# Patient Record
Sex: FEMALE | Race: WHITE | NOT HISPANIC OR LATINO | Employment: UNEMPLOYED | URBAN - METROPOLITAN AREA
[De-identification: names, ages, dates, MRNs, and addresses within clinical notes are randomized per-mention and may not be internally consistent; named-entity substitution may affect disease eponyms.]

---

## 2017-10-04 ENCOUNTER — HISTORICAL (OUTPATIENT)
Dept: LAB | Facility: HOSPITAL | Age: 47
End: 2017-10-04

## 2019-06-06 ENCOUNTER — HISTORICAL (OUTPATIENT)
Dept: LAB | Facility: HOSPITAL | Age: 49
End: 2019-06-06

## 2019-06-06 LAB
ABS NEUT (OLG): 8.2
ALBUMIN SERPL-MCNC: 3.3 GM/DL (ref 3.4–5)
ALBUMIN/GLOB SERPL: 0.8 RATIO (ref 1.1–2)
ALP SERPL-CCNC: 103 UNIT/L (ref 46–116)
ALT SERPL-CCNC: 23 UNIT/L (ref 12–78)
APPEARANCE, UA: ABNORMAL
AST SERPL-CCNC: 9 UNIT/L (ref 10–37)
BACTERIA SPEC CULT: ABNORMAL
BASOPHILS # BLD AUTO: 0.03 X10(3)/MCL
BASOPHILS NFR BLD AUTO: 0.2 %
BILIRUB SERPL-MCNC: 0.4 MG/DL (ref 0.2–1)
BILIRUB UR QL STRIP: NEGATIVE
BILIRUBIN DIRECT+TOT PNL SERPL-MCNC: 0.11 MG/DL (ref 0–0.2)
BILIRUBIN DIRECT+TOT PNL SERPL-MCNC: 0.29 MG/DL
BUN SERPL-MCNC: 13 MG/DL (ref 7–18)
CALCIUM SERPL-MCNC: 9.2 MG/DL (ref 8.5–10.1)
CHLORIDE SERPL-SCNC: 101 MMOL/L (ref 98–107)
CHOLEST SERPL-MCNC: 181 MG/DL (ref 50–200)
CHOLEST/HDLC SERPL: 6 {RATIO} (ref 0–5)
CO2 SERPL-SCNC: 29.6 MMOL/L (ref 21–32)
COLOR UR: YELLOW
CREAT SERPL-MCNC: 0.78 MG/DL (ref 0.55–1.02)
CREAT UR-MCNC: 191 MG/DL
EOSINOPHIL # BLD AUTO: 0.45 X10(3)/MCL
EOSINOPHIL NFR BLD AUTO: 3.6 %
ERYTHROCYTE [DISTWIDTH] IN BLOOD BY AUTOMATED COUNT: 15 %
EST. AVERAGE GLUCOSE BLD GHB EST-MCNC: 151 MG/DL
GLOBULIN SER-MCNC: 4.4 GM/DL (ref 2.4–3.5)
GLUCOSE (UA): NEGATIVE
GLUCOSE SERPL-MCNC: 136 MG/DL (ref 74–106)
HBA1C MFR BLD: 6.9 % (ref 4.5–6.2)
HCT VFR BLD AUTO: 40.6 % (ref 34–46)
HDLC SERPL-MCNC: 29 MG/DL (ref 35–60)
HGB BLD-MCNC: 12.9 GM/DL (ref 11.3–15.4)
HGB UR QL STRIP: NEGATIVE
IMM GRANULOCYTES # BLD AUTO: 0.04 10*3/UL (ref 0–0.1)
IMM GRANULOCYTES NFR BLD AUTO: 0.3 % (ref 0–1)
KETONES UR QL STRIP: NEGATIVE
LDLC SERPL CALC-MCNC: 73 MG/DL (ref 50–140)
LEUKOCYTE ESTERASE UR QL STRIP: ABNORMAL
LYMPHOCYTES # BLD AUTO: 3.21 X10(3)/MCL
LYMPHOCYTES NFR BLD AUTO: 25.4 %
MCH RBC QN AUTO: 25.9 PG (ref 27–33)
MCHC RBC AUTO-ENTMCNC: 31.8 GM/DL (ref 32–35)
MCV RBC AUTO: 81.4 FL (ref 81–97)
MICROALBUMIN UR-MCNC: 3.4 MG/DL
MICROALBUMIN/CREAT RATIO PNL UR: 17.8 MG/GM CR (ref 0–30)
MONOCYTES # BLD AUTO: 0.71 X10(3)/MCL
MONOCYTES NFR BLD AUTO: 5.6 %
NEUTROPHILS # BLD AUTO: 8.2 X10(3)/MCL
NEUTROPHILS NFR BLD AUTO: 64.9 %
NITRITE UR QL STRIP: NEGATIVE
PH UR STRIP: 6 [PH] (ref 4.6–8)
PLATELET # BLD AUTO: 273 X10(3)/MCL (ref 151–368)
PMV BLD AUTO: 9 FL
POTASSIUM SERPL-SCNC: 4.8 MMOL/L (ref 3.5–5.1)
PROT SERPL-MCNC: 7.7 GM/DL (ref 6.4–8.2)
PROT UR QL STRIP: NEGATIVE
RBC # BLD AUTO: 4.99 X10(6)/MCL (ref 3.9–5)
RBC #/AREA URNS HPF: ABNORMAL /[HPF]
SODIUM SERPL-SCNC: 138 MMOL/L (ref 136–145)
SP GR UR STRIP: 1.02 (ref 1–1.03)
SQUAMOUS EPITHELIAL, UA: ABNORMAL
TRIGL SERPL-MCNC: 393 MG/DL (ref 30–150)
TSH SERPL-ACNC: 3.48 MIU/ML (ref 0.35–3.75)
UROBILINOGEN UR STRIP-ACNC: 0.2
VLDLC SERPL CALC-MCNC: 79 MG/DL
WBC # SPEC AUTO: 12.64 X10(3)/MCL (ref 3.4–9.2)
WBC #/AREA URNS HPF: ABNORMAL /HPF

## 2019-06-13 ENCOUNTER — HISTORICAL (OUTPATIENT)
Dept: LAB | Facility: HOSPITAL | Age: 49
End: 2019-06-13

## 2019-06-13 LAB
APPEARANCE, UA: ABNORMAL
BACTERIA SPEC CULT: ABNORMAL
BILIRUB UR QL STRIP: NEGATIVE
COLOR UR: YELLOW
GLUCOSE (UA): NEGATIVE
HGB UR QL STRIP: NEGATIVE
KETONES UR QL STRIP: NEGATIVE
LEUKOCYTE ESTERASE UR QL STRIP: ABNORMAL
NITRITE UR QL STRIP: NEGATIVE
PH UR STRIP: 6 [PH] (ref 4.6–8)
PROT UR QL STRIP: NEGATIVE
RBC #/AREA URNS HPF: ABNORMAL /[HPF]
SP GR UR STRIP: 1.02 (ref 1–1.03)
SQUAMOUS EPITHELIAL, UA: ABNORMAL
UROBILINOGEN UR STRIP-ACNC: 0.2
WBC #/AREA URNS HPF: ABNORMAL /HPF

## 2019-09-13 ENCOUNTER — HISTORICAL (OUTPATIENT)
Dept: LAB | Facility: HOSPITAL | Age: 49
End: 2019-09-13

## 2019-09-13 LAB
ALBUMIN SERPL-MCNC: 3.1 GM/DL (ref 3.4–5)
ALBUMIN/GLOB SERPL: 0.7 RATIO (ref 1.1–2)
ALP SERPL-CCNC: 117 UNIT/L (ref 46–116)
ALT SERPL-CCNC: 21 UNIT/L (ref 12–78)
AST SERPL-CCNC: 16 UNIT/L (ref 10–37)
BILIRUB SERPL-MCNC: 0.5 MG/DL (ref 0.2–1)
BILIRUBIN DIRECT+TOT PNL SERPL-MCNC: 0.12 MG/DL (ref 0–0.2)
BILIRUBIN DIRECT+TOT PNL SERPL-MCNC: 0.38 MG/DL
BUN SERPL-MCNC: 9 MG/DL (ref 7–18)
CALCIUM SERPL-MCNC: 8.7 MG/DL (ref 8.5–10.1)
CHLORIDE SERPL-SCNC: 102 MMOL/L (ref 98–107)
CHOLEST SERPL-MCNC: 154 MG/DL (ref 50–200)
CHOLEST/HDLC SERPL: 4 {RATIO} (ref 0–5)
CO2 SERPL-SCNC: 30.8 MMOL/L (ref 21–32)
CREAT SERPL-MCNC: 0.73 MG/DL (ref 0.55–1.02)
GLOBULIN SER-MCNC: 4.4 GM/DL (ref 2.4–3.5)
GLUCOSE SERPL-MCNC: 133 MG/DL (ref 74–106)
HDLC SERPL-MCNC: 35 MG/DL (ref 35–60)
LDLC SERPL CALC-MCNC: 81 MG/DL (ref 50–140)
POTASSIUM SERPL-SCNC: 4.1 MMOL/L (ref 3.5–5.1)
PROT SERPL-MCNC: 7.5 GM/DL (ref 6.4–8.2)
SODIUM SERPL-SCNC: 139 MMOL/L (ref 136–145)
TRIGL SERPL-MCNC: 191 MG/DL (ref 30–150)
VLDLC SERPL CALC-MCNC: 38 MG/DL

## 2019-09-18 ENCOUNTER — HISTORICAL (OUTPATIENT)
Dept: LAB | Facility: HOSPITAL | Age: 49
End: 2019-09-18

## 2019-09-18 LAB
EST. AVERAGE GLUCOSE BLD GHB EST-MCNC: 166 MG/DL
HBA1C MFR BLD: 7.4 % (ref 4.5–6.2)

## 2019-12-05 ENCOUNTER — HISTORICAL (OUTPATIENT)
Dept: RADIOLOGY | Facility: HOSPITAL | Age: 49
End: 2019-12-05

## 2019-12-19 ENCOUNTER — HISTORICAL (OUTPATIENT)
Dept: LAB | Facility: HOSPITAL | Age: 49
End: 2019-12-19

## 2019-12-19 LAB
CHOLEST SERPL-MCNC: 198 MG/DL
CHOLEST/HDLC SERPL: 5 {RATIO} (ref 0–5)
EST. AVERAGE GLUCOSE BLD GHB EST-MCNC: 134 MG/DL
HBA1C MFR BLD: 6.3 % (ref 4–6)
HDLC SERPL-MCNC: 41 MG/DL
LDLC SERPL CALC-MCNC: 124 MG/DL (ref 50–140)
TRIGL SERPL-MCNC: 165 MG/DL (ref 37–140)
VLDLC SERPL CALC-MCNC: 33 MG/DL

## 2020-01-09 ENCOUNTER — HISTORICAL (OUTPATIENT)
Dept: RADIOLOGY | Facility: HOSPITAL | Age: 50
End: 2020-01-09

## 2020-03-17 ENCOUNTER — HISTORICAL (OUTPATIENT)
Dept: LAB | Facility: HOSPITAL | Age: 50
End: 2020-03-17

## 2020-03-17 LAB
EST. AVERAGE GLUCOSE BLD GHB EST-MCNC: 140 MG/DL
HBA1C MFR BLD: 6.5 % (ref 4–6)

## 2020-07-21 ENCOUNTER — HISTORICAL (OUTPATIENT)
Dept: LAB | Facility: HOSPITAL | Age: 50
End: 2020-07-21

## 2020-07-21 LAB
BUN SERPL-MCNC: 13 MG/DL (ref 7–18.7)
CALCIUM SERPL-MCNC: 9.5 MG/DL (ref 8.4–10.2)
CHLORIDE SERPL-SCNC: 103 MMOL/L (ref 98–107)
CHOLEST SERPL-MCNC: 161 MG/DL
CHOLEST/HDLC SERPL: 4 {RATIO} (ref 0–5)
CO2 SERPL-SCNC: 30 MEQ/L (ref 22–29)
CREAT SERPL-MCNC: 0.63 MG/DL (ref 0.55–1.02)
CREAT UR-MCNC: 135.2 MG/DL (ref 45–106)
CREAT/UREA NIT SERPL: 21
EST. AVERAGE GLUCOSE BLD GHB EST-MCNC: 137 MG/DL
GLUCOSE SERPL-MCNC: 98 MG/DL (ref 74–100)
HBA1C MFR BLD: 6.4 % (ref 4–6)
HDLC SERPL-MCNC: 39 MG/DL (ref 35–60)
LDLC SERPL CALC-MCNC: 92 MG/DL (ref 50–140)
MICROALBUMIN UR-MCNC: 8.9 UG/ML
MICROALBUMIN/CREAT RATIO PNL UR: 6.6 MG/GM CR (ref 0–30)
POTASSIUM SERPL-SCNC: 4.3 MMOL/L (ref 3.5–5.1)
SODIUM SERPL-SCNC: 142 MMOL/L (ref 136–145)
TRIGL SERPL-MCNC: 150 MG/DL (ref 37–140)
VLDLC SERPL CALC-MCNC: 30 MG/DL

## 2020-07-29 ENCOUNTER — HISTORICAL (OUTPATIENT)
Dept: RADIOLOGY | Facility: HOSPITAL | Age: 50
End: 2020-07-29

## 2020-09-10 ENCOUNTER — HISTORICAL (OUTPATIENT)
Dept: LAB | Facility: HOSPITAL | Age: 50
End: 2020-09-10

## 2020-10-19 ENCOUNTER — HISTORICAL (OUTPATIENT)
Dept: LAB | Facility: HOSPITAL | Age: 50
End: 2020-10-19

## 2020-10-19 LAB
BUN SERPL-MCNC: 8 MG/DL (ref 7–18.7)
CALCIUM SERPL-MCNC: 8.9 MG/DL (ref 8.4–10.2)
CHLORIDE SERPL-SCNC: 104 MMOL/L (ref 98–107)
CO2 SERPL-SCNC: 24 MEQ/L (ref 22–29)
CREAT SERPL-MCNC: 0.89 MG/DL (ref 0.55–1.02)
CREAT/UREA NIT SERPL: 9
EST. AVERAGE GLUCOSE BLD GHB EST-MCNC: 166 MG/DL
GLUCOSE SERPL-MCNC: 197 MG/DL (ref 74–100)
HBA1C MFR BLD: 7.4 % (ref 4–6)
POTASSIUM SERPL-SCNC: 3.7 MMOL/L (ref 3.5–5.1)
SODIUM SERPL-SCNC: 140 MMOL/L (ref 136–145)

## 2020-11-27 ENCOUNTER — HISTORICAL (OUTPATIENT)
Dept: LAB | Facility: HOSPITAL | Age: 50
End: 2020-11-27

## 2021-01-19 ENCOUNTER — HISTORICAL (OUTPATIENT)
Dept: LAB | Facility: HOSPITAL | Age: 51
End: 2021-01-19

## 2021-01-19 LAB
BUN SERPL-MCNC: 13 MG/DL (ref 9.8–20.1)
CALCIUM SERPL-MCNC: 9.2 MG/DL (ref 8.4–10.2)
CHLORIDE SERPL-SCNC: 102 MMOL/L (ref 98–107)
CO2 SERPL-SCNC: 27 MEQ/L (ref 22–29)
CREAT SERPL-MCNC: 0.78 MG/DL (ref 0.55–1.02)
CREAT UR-MCNC: 112.1 MG/DL (ref 45–106)
CREAT/UREA NIT SERPL: 17
EST. AVERAGE GLUCOSE BLD GHB EST-MCNC: 151 MG/DL
GLUCOSE SERPL-MCNC: 246 MG/DL (ref 74–100)
HBA1C MFR BLD: 6.9 % (ref 4–6)
MICROALBUMIN UR-MCNC: 9.5 UG/ML
MICROALBUMIN/CREAT RATIO PNL UR: 8.5 MG/GM CR (ref 0–30)
POTASSIUM SERPL-SCNC: 4.6 MMOL/L (ref 3.5–5.1)
SODIUM SERPL-SCNC: 141 MMOL/L (ref 136–145)

## 2021-01-20 ENCOUNTER — HISTORICAL (OUTPATIENT)
Dept: LAB | Facility: HOSPITAL | Age: 51
End: 2021-01-20

## 2021-03-10 ENCOUNTER — HISTORICAL (OUTPATIENT)
Dept: LAB | Facility: HOSPITAL | Age: 51
End: 2021-03-10

## 2021-03-10 LAB
ABS NEUT (OLG): 8.54
APPEARANCE, UA: CLEAR
BACTERIA SPEC CULT: ABNORMAL
BASOPHILS # BLD AUTO: 0.03 X10(3)/MCL
BASOPHILS NFR BLD AUTO: 0.3 %
BILIRUB UR QL STRIP: NEGATIVE
BUN SERPL-MCNC: 23 MG/DL (ref 9.8–20.1)
CALCIUM SERPL-MCNC: 9.3 MG/DL (ref 8.4–10.2)
CHLORIDE SERPL-SCNC: 100 MMOL/L (ref 98–107)
CO2 SERPL-SCNC: 31 MEQ/L (ref 22–29)
COLOR UR: YELLOW
CREAT SERPL-MCNC: 1.13 MG/DL (ref 0.55–1.02)
CREAT/UREA NIT SERPL: 20
EOSINOPHIL # BLD AUTO: 0.28 X10(3)/MCL
EOSINOPHIL NFR BLD AUTO: 2.4 %
ERYTHROCYTE [DISTWIDTH] IN BLOOD BY AUTOMATED COUNT: 17 %
GLUCOSE (UA): ABNORMAL
GLUCOSE SERPL-MCNC: 266 MG/DL (ref 74–100)
HCT VFR BLD AUTO: 35.2 % (ref 34–46)
HGB BLD-MCNC: 10.4 GM/DL (ref 11.3–15.4)
HGB UR QL STRIP: NEGATIVE
IMM GRANULOCYTES # BLD AUTO: 0.04 10*3/UL (ref 0–0.1)
IMM GRANULOCYTES NFR BLD AUTO: 0.3 % (ref 0–1)
KETONES UR QL STRIP: NEGATIVE
LEUKOCYTE ESTERASE UR QL STRIP: NEGATIVE
LYMPHOCYTES # BLD AUTO: 2.24 X10(3)/MCL
LYMPHOCYTES NFR BLD AUTO: 18.9 %
MCH RBC QN AUTO: 22.9 PG (ref 27–33)
MCHC RBC AUTO-ENTMCNC: 29.5 GM/DL (ref 32–35)
MCV RBC AUTO: 77.4 FL (ref 81–97)
MONOCYTES # BLD AUTO: 0.75 X10(3)/MCL
MONOCYTES NFR BLD AUTO: 6.3 %
NEUTROPHILS # BLD AUTO: 8.54 X10(3)/MCL
NEUTROPHILS NFR BLD AUTO: 71.8 %
NITRITE UR QL STRIP: NEGATIVE
PH UR STRIP: 5.5 [PH] (ref 4.6–8)
PLATELET # BLD AUTO: 313 X10(3)/MCL (ref 140–450)
PMV BLD AUTO: 10 FL
POTASSIUM SERPL-SCNC: 4.2 MMOL/L (ref 3.5–5.1)
PROT UR QL STRIP: NEGATIVE
RBC # BLD AUTO: 4.55 X10(6)/MCL (ref 3.9–5)
RBC #/AREA URNS HPF: ABNORMAL /[HPF]
SODIUM SERPL-SCNC: 139 MMOL/L (ref 136–145)
SP GR UR STRIP: 1.02 (ref 1–1.03)
SQUAMOUS EPITHELIAL, UA: ABNORMAL
UROBILINOGEN UR STRIP-ACNC: 0.2
WBC # SPEC AUTO: 11.88 X10(3)/MCL (ref 3.4–9.2)
WBC #/AREA URNS HPF: ABNORMAL /HPF

## 2021-04-07 ENCOUNTER — HISTORICAL (OUTPATIENT)
Dept: LAB | Facility: HOSPITAL | Age: 51
End: 2021-04-07

## 2021-04-07 LAB
ABS NEUT (OLG): 8.26
ANISOCYTOSIS BLD QL SMEAR: NORMAL
BASOPHILS # BLD AUTO: 0.02 X10(3)/MCL
BASOPHILS NFR BLD AUTO: 0.2 %
BUN SERPL-MCNC: 13 MG/DL (ref 9.8–20.1)
CALCIUM SERPL-MCNC: 9.4 MG/DL (ref 8.4–10.2)
CHLORIDE SERPL-SCNC: 101 MMOL/L (ref 98–107)
CHOLEST SERPL-MCNC: 141 MG/DL
CHOLEST/HDLC SERPL: 4 {RATIO} (ref 0–5)
CO2 SERPL-SCNC: 32 MEQ/L (ref 22–29)
CREAT SERPL-MCNC: 0.71 MG/DL (ref 0.55–1.02)
CREAT UR-MCNC: 51 MG/DL (ref 45–106)
CREAT/UREA NIT SERPL: 18
EOSINOPHIL # BLD AUTO: 0.33 X10(3)/MCL
EOSINOPHIL NFR BLD AUTO: 2.8 %
ERYTHROCYTE [DISTWIDTH] IN BLOOD BY AUTOMATED COUNT: 18 %
EST. AVERAGE GLUCOSE BLD GHB EST-MCNC: 160 MG/DL
FERRITIN SERPL-MCNC: 8.24 NG/ML (ref 4.63–204)
GLUCOSE SERPL-MCNC: 178 MG/DL (ref 74–100)
HBA1C MFR BLD: 7.2 % (ref 4–6)
HCT VFR BLD AUTO: 35.6 % (ref 34–46)
HDLC SERPL-MCNC: 36 MG/DL (ref 35–60)
HGB BLD-MCNC: 10.2 GM/DL (ref 11.3–15.4)
HYPOCHROMIA BLD QL SMEAR: NORMAL
IMM GRANULOCYTES # BLD AUTO: 0.03 10*3/UL (ref 0–0.1)
IMM GRANULOCYTES NFR BLD AUTO: 0.3 % (ref 0–1)
IRON SATN MFR SERPL: 8 % (ref 20–50)
IRON SERPL-MCNC: 24 UG/DL (ref 50–170)
LDLC SERPL CALC-MCNC: 74 MG/DL (ref 50–140)
LYMPHOCYTES # BLD AUTO: 2.49 X10(3)/MCL
LYMPHOCYTES NFR BLD AUTO: 21.4 %
MACROCYTES BLD QL SMEAR: SLIGHT
MCH RBC QN AUTO: 22.1 PG (ref 27–33)
MCHC RBC AUTO-ENTMCNC: 28.7 GM/DL (ref 32–35)
MCV RBC AUTO: 77.2 FL (ref 81–97)
MICROALBUMIN UR-MCNC: <5 UG/ML
MICROALBUMIN/CREAT RATIO PNL UR: <9.8 MG/GM CR (ref 0–30)
MICROCYTES BLD QL SMEAR: NORMAL
MONOCYTES # BLD AUTO: 0.51 X10(3)/MCL
MONOCYTES NFR BLD AUTO: 4.4 %
NEUTROPHILS # BLD AUTO: 8.26 X10(3)/MCL
NEUTROPHILS NFR BLD AUTO: 70.9 %
PLATELET # BLD AUTO: 332 X10(3)/MCL (ref 140–450)
PLATELET # BLD EST: NORMAL 10*3/UL
PMV BLD AUTO: 10 FL
POLYCHROMASIA BLD QL SMEAR: SLIGHT
POTASSIUM SERPL-SCNC: 4 MMOL/L (ref 3.5–5.1)
RBC # BLD AUTO: 4.61 X10(6)/MCL (ref 3.9–5)
SODIUM SERPL-SCNC: 140 MMOL/L (ref 136–145)
TARGETS BLD QL SMEAR: SLIGHT
TIBC SERPL-MCNC: 292 UG/DL (ref 70–310)
TIBC SERPL-MCNC: 316 UG/DL (ref 250–450)
TRANSFERRIN SERPL-MCNC: 275 MG/DL (ref 180–382)
TRIGL SERPL-MCNC: 155 MG/DL (ref 37–140)
VIT B12 SERPL-MCNC: 366 PG/ML (ref 213–816)
VLDLC SERPL CALC-MCNC: 31 MG/DL
WBC # SPEC AUTO: 11.64 X10(3)/MCL (ref 3.4–9.2)

## 2021-04-14 ENCOUNTER — HISTORICAL (OUTPATIENT)
Dept: INFECTIOUS DISEASES | Facility: HOSPITAL | Age: 51
End: 2021-04-14

## 2021-06-29 ENCOUNTER — HISTORICAL (OUTPATIENT)
Dept: LAB | Facility: HOSPITAL | Age: 51
End: 2021-06-29

## 2021-06-29 LAB
ABS NEUT (OLG): 5.76
BASOPHILS # BLD AUTO: 0.02 X10(3)/MCL
BASOPHILS NFR BLD AUTO: 0.2 %
BUN SERPL-MCNC: 12 MG/DL (ref 9.8–20.1)
CALCIUM SERPL-MCNC: 8.9 MG/DL (ref 8.4–10.2)
CHLORIDE SERPL-SCNC: 105 MMOL/L (ref 98–107)
CO2 SERPL-SCNC: 27 MEQ/L (ref 22–29)
CREAT SERPL-MCNC: 0.64 MG/DL (ref 0.55–1.02)
CREAT/UREA NIT SERPL: 19
EOSINOPHIL # BLD AUTO: 0.35 X10(3)/MCL
EOSINOPHIL NFR BLD AUTO: 3.8 %
ERYTHROCYTE [DISTWIDTH] IN BLOOD BY AUTOMATED COUNT: 20 %
EST. AVERAGE GLUCOSE BLD GHB EST-MCNC: 146 MG/DL
GLUCOSE SERPL-MCNC: 133 MG/DL (ref 74–100)
HBA1C MFR BLD: 6.7 % (ref 4–6)
HCT VFR BLD AUTO: 42.4 % (ref 34–46)
HGB BLD-MCNC: 12.5 GM/DL (ref 11.3–15.4)
IMM GRANULOCYTES # BLD AUTO: 0.02 10*3/UL (ref 0–0.1)
IMM GRANULOCYTES NFR BLD AUTO: 0.2 % (ref 0–1)
LYMPHOCYTES # BLD AUTO: 2.49 X10(3)/MCL
LYMPHOCYTES NFR BLD AUTO: 27.1 %
MCH RBC QN AUTO: 24 PG (ref 27–33)
MCHC RBC AUTO-ENTMCNC: 29.5 GM/DL (ref 32–35)
MCV RBC AUTO: 81.4 FL (ref 81–97)
MONOCYTES # BLD AUTO: 0.54 X10(3)/MCL
MONOCYTES NFR BLD AUTO: 5.9 %
NEUTROPHILS # BLD AUTO: 5.76 X10(3)/MCL
NEUTROPHILS NFR BLD AUTO: 62.8 %
PLATELET # BLD AUTO: 314 X10(3)/MCL (ref 140–450)
PMV BLD AUTO: 10 FL
POTASSIUM SERPL-SCNC: 3.8 MMOL/L (ref 3.5–5.1)
RBC # BLD AUTO: 5.21 X10(6)/MCL (ref 3.9–5)
SODIUM SERPL-SCNC: 143 MMOL/L (ref 136–145)
WBC # SPEC AUTO: 9.18 X10(3)/MCL (ref 3.4–9.2)

## 2021-07-06 ENCOUNTER — HISTORICAL (OUTPATIENT)
Dept: RADIOLOGY | Facility: HOSPITAL | Age: 51
End: 2021-07-06

## 2021-10-05 ENCOUNTER — HISTORICAL (OUTPATIENT)
Dept: LAB | Facility: HOSPITAL | Age: 51
End: 2021-10-05

## 2021-10-05 LAB
ABS NEUT (OLG): 6.85
BASOPHILS # BLD AUTO: 0.04 X10(3)/MCL
BASOPHILS NFR BLD AUTO: 0.4 %
EOSINOPHIL # BLD AUTO: 0.33 X10(3)/MCL
EOSINOPHIL NFR BLD AUTO: 3.1 %
ERYTHROCYTE [DISTWIDTH] IN BLOOD BY AUTOMATED COUNT: 16 %
EST. AVERAGE GLUCOSE BLD GHB EST-MCNC: 192 MG/DL
HBA1C MFR BLD: 8.3 % (ref 4–6)
HCT VFR BLD AUTO: 45.8 % (ref 34–46)
HGB BLD-MCNC: 14.4 GM/DL (ref 11.3–15.4)
IMM GRANULOCYTES # BLD AUTO: 0.07 10*3/UL (ref 0–0.1)
IMM GRANULOCYTES NFR BLD AUTO: 0.7 % (ref 0–1)
LYMPHOCYTES # BLD AUTO: 2.68 X10(3)/MCL
LYMPHOCYTES NFR BLD AUTO: 25.5 %
MCH RBC QN AUTO: 26.5 PG (ref 27–33)
MCHC RBC AUTO-ENTMCNC: 31.4 GM/DL (ref 32–35)
MCV RBC AUTO: 84.3 FL (ref 81–97)
MONOCYTES # BLD AUTO: 0.53 X10(3)/MCL
MONOCYTES NFR BLD AUTO: 5 %
NEUTROPHILS # BLD AUTO: 6.85 X10(3)/MCL
NEUTROPHILS NFR BLD AUTO: 65.3 %
PLATELET # BLD AUTO: 326 X10(3)/MCL (ref 140–450)
PMV BLD AUTO: 10 FL
RBC # BLD AUTO: 5.43 X10(6)/MCL (ref 3.9–5)
WBC # SPEC AUTO: 10.5 X10(3)/MCL (ref 3.4–9.2)

## 2021-11-30 LAB
HUMAN PAPILLOMAVIRUS (HPV): NORMAL
PAP RECOMMENDATION EXT: NORMAL
PAP SMEAR: NORMAL

## 2021-12-21 ENCOUNTER — HISTORICAL (OUTPATIENT)
Dept: LAB | Facility: HOSPITAL | Age: 51
End: 2021-12-21

## 2021-12-21 LAB
FLUAV AG UPPER RESP QL IA.RAPID: NEGATIVE
FLUBV AG UPPER RESP QL IA.RAPID: NEGATIVE
SARS-COV-2 RNA RESP QL NAA+PROBE: NOT DETECTED

## 2022-02-08 ENCOUNTER — HISTORICAL (OUTPATIENT)
Dept: LAB | Facility: HOSPITAL | Age: 52
End: 2022-02-08

## 2022-02-08 LAB
ABS NEUT (OLG): 6.37
ALBUMIN SERPL-MCNC: 3.2 G/DL (ref 3.5–5)
ALBUMIN/GLOB SERPL: 0.8 {RATIO} (ref 1.1–2)
ALP SERPL-CCNC: 111 U/L (ref 40–150)
ALT SERPL-CCNC: 17 U/L (ref 0–55)
APPEARANCE, UA: CLEAR
AST SERPL-CCNC: 12 U/L (ref 5–34)
BACTERIA SPEC CULT: NORMAL
BASOPHILS # BLD AUTO: 0.02 10*3/UL
BASOPHILS NFR BLD AUTO: 0.2 %
BILIRUB SERPL-MCNC: 0.5 MG/DL
BILIRUB UR QL STRIP: NEGATIVE
BILIRUBIN DIRECT+TOT PNL SERPL-MCNC: 0.2 (ref 0–0.5)
BILIRUBIN DIRECT+TOT PNL SERPL-MCNC: 0.3
BUN SERPL-MCNC: 9 MG/DL (ref 9.8–20.1)
CALCIUM SERPL-MCNC: 9.4 MG/DL (ref 8.7–10.5)
CHLORIDE SERPL-SCNC: 100 MMOL/L (ref 98–107)
CHOLEST SERPL-MCNC: 199 MG/DL
CHOLEST/HDLC SERPL: 6 {RATIO} (ref 0–5)
CO2 SERPL-SCNC: 29 MMOL/L (ref 22–29)
COLOR UR: YELLOW
CREAT SERPL-MCNC: 0.71 MG/DL (ref 0.55–1.02)
EOSINOPHIL # BLD AUTO: 0.64 10*3/UL
EOSINOPHIL NFR BLD AUTO: 6.4 %
ERYTHROCYTE [DISTWIDTH] IN BLOOD BY AUTOMATED COUNT: 16 %
EST. AVERAGE GLUCOSE BLD GHB EST-MCNC: 200 MG/DL
GLOBULIN SER-MCNC: 4.1 G/DL (ref 2.4–3.5)
GLUCOSE (UA): NORMAL
GLUCOSE SERPL-MCNC: 248 MG/DL (ref 74–100)
HBA1C MFR BLD: 8.6 % (ref 4–6)
HCT VFR BLD AUTO: 44.4 % (ref 34–46)
HDLC SERPL-MCNC: 32 MG/DL (ref 35–60)
HEMOLYSIS INTERF INDEX SERPL-ACNC: -2
HGB BLD-MCNC: 13.8 G/DL (ref 11.3–15.4)
HGB UR QL STRIP: NEGATIVE
ICTERIC INTERF INDEX SERPL-ACNC: 0
IMM GRANULOCYTES # BLD AUTO: 0.02 10*3/UL (ref 0–0.1)
IMM GRANULOCYTES NFR BLD AUTO: 0.2 % (ref 0–1)
KETONES UR QL STRIP: NEGATIVE
LDLC SERPL CALC-MCNC: 100 MG/DL (ref 50–140)
LEUKOCYTE ESTERASE UR QL STRIP: NEGATIVE
LIPEMIC INTERF INDEX SERPL-ACNC: 4
LYMPHOCYTES # BLD AUTO: 2.43 10*3/UL
LYMPHOCYTES NFR BLD AUTO: 24.3 %
MANUAL DIFF? (OHS): NO
MCH RBC QN AUTO: 26.1 PG (ref 27–33)
MCHC RBC AUTO-ENTMCNC: 31.1 G/DL (ref 32–35)
MCV RBC AUTO: 83.9 FL (ref 81–97)
MONOCYTES # BLD AUTO: 0.53 10*3/UL
MONOCYTES NFR BLD AUTO: 5.3 %
NEUTROPHILS # BLD AUTO: 6.37 10*3/UL
NEUTROPHILS NFR BLD AUTO: 63.6 %
NITRITE UR QL STRIP: NEGATIVE
PH UR STRIP: 6 [PH] (ref 4.6–8)
PLATELET # BLD AUTO: 281 10*3/UL (ref 140–450)
PMV BLD AUTO: 10 FL
POTASSIUM SERPL-SCNC: 4.8 MMOL/L (ref 3.5–5.1)
PROT SERPL-MCNC: 7.3 G/DL (ref 6.4–8.3)
PROT UR QL STRIP: NEGATIVE
RBC # BLD AUTO: 5.29 10*6/UL (ref 3.9–5)
RBC #/AREA URNS HPF: NORMAL /[HPF]
SODIUM SERPL-SCNC: 137 MMOL/L (ref 136–145)
SP GR UR STRIP: 1.02 (ref 1–1.03)
SQUAMOUS EPITHELIAL, UA: NORMAL
TRIGL SERPL-MCNC: 335 MG/DL (ref 37–140)
TSH SERPL-ACNC: 2.75 M[IU]/L (ref 0.35–4.94)
UROBILINOGEN UR STRIP-ACNC: 0.2
VLDLC SERPL CALC-MCNC: 67 MG/DL
WBC # SPEC AUTO: 10.01 10*3/UL (ref 3.4–9.2)
WBC #/AREA URNS HPF: NORMAL /[HPF]

## 2022-02-09 LAB
CREAT UR-MCNC: 42.8 MG/DL (ref 45–106)
MICROALBUMIN UR-MCNC: 6.2
MICROALBUMIN/CREAT RATIO PNL UR: 14.5 (ref 0–30)

## 2022-02-28 LAB — HEMOCCULT STL QL IA: NEGATIVE

## 2022-04-11 ENCOUNTER — HISTORICAL (OUTPATIENT)
Dept: ADMINISTRATIVE | Facility: HOSPITAL | Age: 52
End: 2022-04-11
Payer: MEDICAID

## 2022-04-25 DIAGNOSIS — E11.9 TYPE 2 DIABETES MELLITUS WITHOUT COMPLICATION, WITH LONG-TERM CURRENT USE OF INSULIN: Primary | ICD-10-CM

## 2022-04-25 DIAGNOSIS — Z79.4 TYPE 2 DIABETES MELLITUS WITHOUT COMPLICATION, WITH LONG-TERM CURRENT USE OF INSULIN: Primary | ICD-10-CM

## 2022-04-27 VITALS
BODY MASS INDEX: 49.54 KG/M2 | OXYGEN SATURATION: 98 % | SYSTOLIC BLOOD PRESSURE: 136 MMHG | HEIGHT: 61 IN | DIASTOLIC BLOOD PRESSURE: 84 MMHG | WEIGHT: 262.38 LBS

## 2022-04-29 ENCOUNTER — HOSPITAL ENCOUNTER (INPATIENT)
Facility: HOSPITAL | Age: 52
LOS: 4 days | Discharge: HOME | DRG: 193 | End: 2022-05-03
Attending: INTERNAL MEDICINE | Admitting: INTERNAL MEDICINE
Payer: MEDICAID

## 2022-04-29 PROCEDURE — 84484 ASSAY OF TROPONIN QUANT: CPT

## 2022-04-29 PROCEDURE — 80053 COMPREHEN METABOLIC PANEL: CPT

## 2022-04-29 PROCEDURE — 99990 CHARGE CONVERSION: CPT | Mod: QW

## 2022-04-29 PROCEDURE — 94644 CONT INHLJ TX 1ST HOUR: CPT

## 2022-04-29 PROCEDURE — 87040 BLOOD CULTURE FOR BACTERIA: CPT

## 2022-04-29 PROCEDURE — 36415 COLL VENOUS BLD VENIPUNCTURE: CPT

## 2022-04-29 PROCEDURE — 94645 CONT INHLJ TX EACH ADDL HOUR: CPT

## 2022-04-29 PROCEDURE — 96365 THER/PROPH/DIAG IV INF INIT: CPT

## 2022-04-29 PROCEDURE — 82805 BLOOD GASES W/O2 SATURATION: CPT

## 2022-04-29 PROCEDURE — 87636 SARSCOV2 & INF A&B AMP PRB: CPT | Mod: CR

## 2022-04-29 PROCEDURE — 96374 THER/PROPH/DIAG INJ IV PUSH: CPT

## 2022-04-29 PROCEDURE — 87635 SARS-COV-2 COVID-19 AMP PRB: CPT | Mod: QW

## 2022-04-29 PROCEDURE — 93005 ELECTROCARDIOGRAM TRACING: CPT

## 2022-04-29 PROCEDURE — 71046 X-RAY EXAM CHEST 2 VIEWS: CPT

## 2022-04-29 PROCEDURE — 99285 EMERGENCY DEPT VISIT HI MDM: CPT | Mod: 25

## 2022-04-29 PROCEDURE — 36600 WITHDRAWAL OF ARTERIAL BLOOD: CPT

## 2022-04-29 PROCEDURE — 94640 AIRWAY INHALATION TREATMENT: CPT

## 2022-04-29 PROCEDURE — 85025 COMPLETE CBC W/AUTO DIFF WBC: CPT

## 2022-04-30 DIAGNOSIS — J18.9 PNEUMONIA: ICD-10-CM

## 2022-04-30 PROCEDURE — 85025 COMPLETE CBC W/AUTO DIFF WBC: CPT

## 2022-04-30 PROCEDURE — 36415 COLL VENOUS BLD VENIPUNCTURE: CPT

## 2022-04-30 PROCEDURE — A9150 MISC/EXPER NON-PRESCRIPT DRU: HCPCS

## 2022-04-30 PROCEDURE — 71275 CT ANGIOGRAPHY CHEST: CPT

## 2022-04-30 PROCEDURE — 82105 ALPHA-FETOPROTEIN SERUM: CPT

## 2022-04-30 PROCEDURE — 80048 BASIC METABOLIC PNL TOTAL CA: CPT

## 2022-04-30 PROCEDURE — 99990 CHARGE CONVERSION: CPT

## 2022-04-30 RX ORDER — ALBUTEROL SULFATE 0.83 MG/ML
2.5 SOLUTION RESPIRATORY (INHALATION) EVERY 4 HOURS PRN
Status: DISCONTINUED | OUTPATIENT
Start: 2022-04-30 | End: 2022-05-03 | Stop reason: HOSPADM

## 2022-04-30 RX ORDER — EMPAGLIFLOZIN 25 MG/1
25 TABLET, FILM COATED ORAL DAILY
COMMUNITY
Start: 2021-11-17 | End: 2022-06-28

## 2022-04-30 RX ORDER — PANTOPRAZOLE SODIUM 40 MG/1
40 TABLET, DELAYED RELEASE ORAL DAILY
Status: DISCONTINUED | OUTPATIENT
Start: 2022-04-30 | End: 2022-05-03 | Stop reason: HOSPADM

## 2022-04-30 RX ORDER — GLIPIZIDE 10 MG/1
10 TABLET ORAL DAILY
Status: ON HOLD | COMMUNITY
Start: 2022-01-10 | End: 2022-05-02

## 2022-04-30 RX ORDER — ATORVASTATIN CALCIUM 40 MG/1
40 TABLET, FILM COATED ORAL
Status: ON HOLD | COMMUNITY
Start: 2022-01-10 | End: 2022-05-02

## 2022-04-30 RX ORDER — PIOGLITAZONEHYDROCHLORIDE 30 MG/1
30 TABLET ORAL DAILY
COMMUNITY
Start: 2022-02-07 | End: 2022-05-31

## 2022-04-30 RX ORDER — ONDANSETRON 2 MG/ML
4 INJECTION INTRAMUSCULAR; INTRAVENOUS EVERY 4 HOURS PRN
Status: DISCONTINUED | OUTPATIENT
Start: 2022-04-30 | End: 2022-05-03 | Stop reason: HOSPADM

## 2022-04-30 RX ORDER — HYDROCHLOROTHIAZIDE 12.5 MG/1
12.5 CAPSULE ORAL DAILY
COMMUNITY
Start: 2022-01-10 | End: 2022-06-28

## 2022-04-30 RX ORDER — FERROUS GLUCONATE 324(38)MG
324 TABLET ORAL 2 TIMES DAILY
COMMUNITY
Start: 2021-10-13 | End: 2022-09-16

## 2022-04-30 RX ORDER — VENLAFAXINE HYDROCHLORIDE 150 MG/1
150 CAPSULE, EXTENDED RELEASE ORAL DAILY
COMMUNITY
Start: 2022-01-10 | End: 2022-06-28

## 2022-04-30 RX ORDER — ENOXAPARIN SODIUM 100 MG/ML
40 INJECTION SUBCUTANEOUS EVERY 24 HOURS
Status: DISCONTINUED | OUTPATIENT
Start: 2022-04-30 | End: 2022-05-03 | Stop reason: HOSPADM

## 2022-04-30 RX ORDER — SODIUM CHLORIDE, SODIUM LACTATE, POTASSIUM CHLORIDE, CALCIUM CHLORIDE 600; 310; 30; 20 MG/100ML; MG/100ML; MG/100ML; MG/100ML
INJECTION, SOLUTION INTRAVENOUS CONTINUOUS
Status: DISCONTINUED | OUTPATIENT
Start: 2022-04-30 | End: 2022-05-02

## 2022-04-30 RX ORDER — OXYBUTYNIN CHLORIDE 5 MG/1
5 TABLET ORAL 3 TIMES DAILY
Status: DISCONTINUED | OUTPATIENT
Start: 2022-04-30 | End: 2022-05-03 | Stop reason: HOSPADM

## 2022-04-30 RX ORDER — ACETAMINOPHEN 500 MG
1000 TABLET ORAL EVERY 6 HOURS PRN
Status: DISCONTINUED | OUTPATIENT
Start: 2022-04-30 | End: 2022-05-03 | Stop reason: HOSPADM

## 2022-04-30 RX ORDER — LOSARTAN POTASSIUM 100 MG/1
100 TABLET ORAL DAILY
COMMUNITY
Start: 2021-12-13 | End: 2022-07-28

## 2022-04-30 RX ORDER — OSELTAMIVIR PHOSPHATE 75 MG/1
75 CAPSULE ORAL 2 TIMES DAILY
Status: DISCONTINUED | OUTPATIENT
Start: 2022-04-30 | End: 2022-05-03 | Stop reason: HOSPADM

## 2022-04-30 RX ORDER — ARIPIPRAZOLE 5 MG/1
5 TABLET ORAL DAILY
Status: DISCONTINUED | OUTPATIENT
Start: 2022-04-30 | End: 2022-05-03 | Stop reason: HOSPADM

## 2022-04-30 RX ORDER — ARIPIPRAZOLE 5 MG/1
5 TABLET ORAL DAILY
COMMUNITY
Start: 2021-12-13 | End: 2023-03-13

## 2022-04-30 RX ORDER — LANOLIN ALCOHOL/MO/W.PET/CERES
1 CREAM (GRAM) TOPICAL 2 TIMES DAILY
Status: DISCONTINUED | OUTPATIENT
Start: 2022-04-30 | End: 2022-05-03 | Stop reason: HOSPADM

## 2022-04-30 RX ORDER — ACETAMINOPHEN 650 MG/20.3ML
650 LIQUID ORAL EVERY 6 HOURS PRN
Status: DISCONTINUED | OUTPATIENT
Start: 2022-04-30 | End: 2022-05-03 | Stop reason: HOSPADM

## 2022-04-30 RX ORDER — OXYBUTYNIN CHLORIDE 5 MG/1
5 TABLET ORAL 3 TIMES DAILY
COMMUNITY
Start: 2021-12-13 | End: 2022-07-28

## 2022-05-01 LAB
POCT GLUCOSE: 108 MG/DL (ref 70–110)
POCT GLUCOSE: 110 MG/DL (ref 70–110)

## 2022-05-01 PROCEDURE — 63600175 PHARM REV CODE 636 W HCPCS

## 2022-05-01 PROCEDURE — 25500020 PHARM REV CODE 255: Performed by: INTERNAL MEDICINE

## 2022-05-01 PROCEDURE — 11000001 HC ACUTE MED/SURG PRIVATE ROOM

## 2022-05-01 PROCEDURE — 27000207 HC ISOLATION

## 2022-05-01 PROCEDURE — 25000003 PHARM REV CODE 250

## 2022-05-01 PROCEDURE — 99990 CHARGE CONVERSION: CPT

## 2022-05-01 RX ORDER — DIPHENHYDRAMINE HCL 25 MG
25 CAPSULE ORAL EVERY 6 HOURS PRN
Status: DISCONTINUED | OUTPATIENT
Start: 2022-05-01 | End: 2022-05-03 | Stop reason: HOSPADM

## 2022-05-01 RX ADMIN — PANTOPRAZOLE SODIUM 40 MG: 40 TABLET, DELAYED RELEASE ORAL at 11:05

## 2022-05-01 RX ADMIN — ENOXAPARIN SODIUM 40 MG: 40 INJECTION SUBCUTANEOUS at 04:05

## 2022-05-01 RX ADMIN — OXYBUTYNIN CHLORIDE 5 MG: 5 TABLET ORAL at 09:05

## 2022-05-01 RX ADMIN — FERROUS SULFATE TAB 325 MG (65 MG ELEMENTAL FE) 1 EACH: 325 (65 FE) TAB at 09:05

## 2022-05-01 RX ADMIN — AZITHROMYCIN MONOHYDRATE 500 MG: 500 INJECTION, POWDER, LYOPHILIZED, FOR SOLUTION INTRAVENOUS at 09:05

## 2022-05-01 RX ADMIN — OSELTAMIVIR PHOSPHATE 75 MG: 75 CAPSULE ORAL at 11:05

## 2022-05-01 RX ADMIN — OXYBUTYNIN CHLORIDE 5 MG: 5 TABLET ORAL at 11:05

## 2022-05-01 RX ADMIN — DEXTROSE MONOHYDRATE 1 G: 50 INJECTION, SOLUTION INTRAVENOUS at 09:05

## 2022-05-01 RX ADMIN — OSELTAMIVIR PHOSPHATE 75 MG: 75 CAPSULE ORAL at 09:05

## 2022-05-01 RX ADMIN — ARIPIPRAZOLE 5 MG: 5 TABLET ORAL at 11:05

## 2022-05-01 RX ADMIN — IOPAMIDOL 100 ML: 755 INJECTION, SOLUTION INTRAVENOUS at 08:05

## 2022-05-01 RX ADMIN — FERROUS SULFATE TAB 325 MG (65 MG ELEMENTAL FE) 1 EACH: 325 (65 FE) TAB at 11:05

## 2022-05-01 NOTE — PROGRESS NOTES
"[facility]  Hospital Medicine Progress Note        Chief Complaint  Follow up on influenza A and hypoxic respiratory failure    History of Present Illness  Ms. Vazquez is a 52 year old WF who presents to the ED with c/o SOB and non-exertional CP x 1 day. She also reports a productive cough with yellow sputum. Felt febrile but never took temperature. She denies hx of COPD or asthma.    Upon arrival into the ED patient was hypoxic with an oxygen saturation of 80% and was placed on 2 L nasal cannula with improvement in oxygenation. Chest x-ray with no acute process. Influenza A positive. Patient was admitted to the hospitalist service for further management of her acute hypoxemia.    Today: Looks and feels about the same. Afebrile, on O2 at 3L/min via NC, and hemodynamically stable. No new issues. Non-conversant.      Objective/physical exam:  Vitals can be found below    General: in no acute distress  Eye: PERRL, EOMI, clear conjunctiva, eyelids normal  HENT: normocephalic, atraumatic  Neck: full range of motion, no thyromegaly  Respiratory: coarse breath sounds bilaterally  Cardiovascular: regular rate and rhythm  Gastrointestinal: soft, non-tender, non-distended with normal bowel sounds  Musculoskeletal: no gross deformity  Integumentary: no rashes or skin lesions present  Neurologic: cranial nerves grossly intact, no signs of peripheral neurological deficit  Psychiatric: flat affect, depressed      Blood pressure 105/70, pulse 70, temperature 97.7 °F (36.5 °C), resp. rate 20, height 5' 2" (1.575 m), weight 110.9 kg (244 lb 6.4 oz), SpO2 100 %, not currently breastfeeding.      Scheduled Med:   ARIPiprazole  5 mg Oral Daily    azithromycin  500 mg Intravenous Q24H    cefTRIAXone (ROCEPHIN) IVPB  1 g Intravenous Q24H    enoxaparin  40 mg Subcutaneous Daily    ferrous sulfate  1 tablet Oral BID    oseltamivir  75 mg Oral BID    oxybutynin  5 mg Oral TID    pantoprazole  40 mg Oral " Daily      Continuous Infusions:   lactated ringers        PRN Meds:  acetaminophen, acetaminophen, albuterol sulfate, ondansetron       Assessment/Plan:    1. Acute respiratory failure with hypoxia J96.01   2. Influenza A J10.1   3. Non-insulin treated type 2 diabetes mellitus E11.9   4. Hypertension I10   5. Anxiety and depression F41.9   6. Nonalcoholic fatty liver disease K76.0   7. Morbid obesity E66.01   8. Hypokalemia E87.6     Plan:  Continue Tamiflu, nebulized bronchodilators, supplemental oxygen, IVF's, and empiric antibiotics  Resumed appropriate home meds  Added CBG checks with ISS    Critical care diagnosis: Acute hypoxemic respiratory failure secondary to influenza requiring high flow oxygen.  Critical care intervention: Hands-on evaluation, review of labs, radiographs, medical records and discussion with patient and staff in order to assess and manage the high probability of imminent or life-threatening deterioration of cardio-respiratory status requiring vasopressor support and intubation and mechanical ventilation.  Critical care time spent: 35 minutes        All diagnosis and differential diagnosis have been reviewed; assessment and plan has been documented; I have personally reviewed the labs and test results that are presently available; I have reviewed the patients medication list; I have reviewed the consulting providers response and recommendations. I have reviewed or attempted to review medical records based upon their availability    All of the patient's questions have been  addressed and answered. Patient's is agreeable to the above stated plan. I will continue to monitor closely and make adjustments to medical management as needed.            Danie Ledezma MD   05/01/2022

## 2022-05-02 LAB
POCT GLUCOSE: 100 MG/DL (ref 70–110)
POCT GLUCOSE: 106 MG/DL (ref 70–110)
POCT GLUCOSE: 95 MG/DL (ref 70–110)
POCT GLUCOSE: 98 MG/DL (ref 70–110)

## 2022-05-02 PROCEDURE — C1751 CATH, INF, PER/CENT/MIDLINE: HCPCS

## 2022-05-02 PROCEDURE — 63600175 PHARM REV CODE 636 W HCPCS

## 2022-05-02 PROCEDURE — 25000003 PHARM REV CODE 250

## 2022-05-02 PROCEDURE — 27000207 HC ISOLATION

## 2022-05-02 PROCEDURE — 94761 N-INVAS EAR/PLS OXIMETRY MLT: CPT

## 2022-05-02 PROCEDURE — 11000001 HC ACUTE MED/SURG PRIVATE ROOM

## 2022-05-02 PROCEDURE — 36410 VNPNXR 3YR/> PHY/QHP DX/THER: CPT

## 2022-05-02 RX ADMIN — FERROUS SULFATE TAB 325 MG (65 MG ELEMENTAL FE) 1 EACH: 325 (65 FE) TAB at 09:05

## 2022-05-02 RX ADMIN — OSELTAMIVIR PHOSPHATE 75 MG: 75 CAPSULE ORAL at 09:05

## 2022-05-02 RX ADMIN — OXYBUTYNIN CHLORIDE 5 MG: 5 TABLET ORAL at 09:05

## 2022-05-02 RX ADMIN — DEXTROSE MONOHYDRATE 1 G: 50 INJECTION, SOLUTION INTRAVENOUS at 09:05

## 2022-05-02 RX ADMIN — AZITHROMYCIN MONOHYDRATE 500 MG: 500 INJECTION, POWDER, LYOPHILIZED, FOR SOLUTION INTRAVENOUS at 09:05

## 2022-05-02 RX ADMIN — ARIPIPRAZOLE 5 MG: 5 TABLET ORAL at 09:05

## 2022-05-02 RX ADMIN — PANTOPRAZOLE SODIUM 40 MG: 40 TABLET, DELAYED RELEASE ORAL at 09:05

## 2022-05-02 RX ADMIN — OXYBUTYNIN CHLORIDE 5 MG: 5 TABLET ORAL at 04:05

## 2022-05-02 RX ADMIN — ENOXAPARIN SODIUM 40 MG: 40 INJECTION SUBCUTANEOUS at 04:05

## 2022-05-02 NOTE — PLAN OF CARE
Problem: Adult Inpatient Plan of Care  Goal: Plan of Care Review  Outcome: Ongoing, Progressing  Flowsheets (Taken 5/2/2022 0320)  Plan of Care Reviewed With: patient  Goal: Optimal Comfort and Wellbeing  Outcome: Ongoing, Progressing     Problem: Infection  Goal: Absence of Infection Signs and Symptoms  Outcome: Ongoing, Progressing      Not applicable

## 2022-05-02 NOTE — PROGRESS NOTES
"[facility]  Hospital Medicine Progress Note        Chief Complaint  Follow up on influenza A and hypoxic respiratory failure    History of Present Illness  Ms. Vazquez is a 52 year old WF who presents to the ED with c/o SOB and non-exertional CP x 1 day. She also reports a productive cough with yellow sputum. Felt febrile but never took temperature. She denies hx of COPD or asthma.    Upon arrival into the ED patient was hypoxic with an oxygen saturation of 80% and was placed on 2 L nasal cannula with improvement in oxygenation. Chest x-ray with no acute process. Influenza A positive. Patient was admitted to the hospitalist service for further management of her acute hypoxemia.    Today: Feels a little better.  Remains with BAKER. Afebrile, on O2 at 3L/min via NC, and hemodynamically stable. No new issues.     Objective/physical exam:  Vitals can be found below    General: in no acute distress  Eye: PERRL, EOMI, clear conjunctiva, eyelids normal  HENT: normocephalic, atraumatic  Neck: full range of motion, no thyromegaly  Respiratory: coarse breath sounds bilaterally  Cardiovascular: regular rate and rhythm  Gastrointestinal: soft, non-tender, non-distended with normal bowel sounds  Musculoskeletal: no gross deformity  Integumentary: no rashes or skin lesions present  Neurologic: cranial nerves grossly intact, no signs of peripheral neurological deficit  Psychiatric: flat affect, depressed      Blood pressure 136/87, pulse 67, temperature 97.6 °F (36.4 °C), temperature source Oral, resp. rate 20, height 5' 2" (1.575 m), weight 110.9 kg (244 lb 6.4 oz), SpO2 100 %, not currently breastfeeding.      Scheduled Med:   ARIPiprazole  5 mg Oral Daily    azithromycin  500 mg Intravenous Q24H    cefTRIAXone (ROCEPHIN) IVPB  1 g Intravenous Q24H    enoxaparin  40 mg Subcutaneous Daily    ferrous sulfate  1 tablet Oral BID    oseltamivir  75 mg Oral BID    oxybutynin  5 mg Oral TID    " pantoprazole  40 mg Oral Daily      Continuous Infusions:   lactated ringers        PRN Meds:  acetaminophen, acetaminophen, albuterol sulfate, diphenhydrAMINE, ondansetron       Assessment/Plan:    1. Acute respiratory failure with hypoxia J96.01   2. Influenza A J10.1   3. Non-insulin treated type 2 diabetes mellitus E11.9   4. Hypertension I10   5. Anxiety and depression F41.9   6. Nonalcoholic fatty liver disease K76.0   7. Morbid obesity E66.01   8. Hypokalemia E87.6     Plan:  Continue Tamiflu, nebulized bronchodilators, supplemental oxygen, IVF's, and empiric antibiotics  Resumed appropriate home meds  Added CBG checks with ISS          All diagnosis and differential diagnosis have been reviewed; assessment and plan has been documented; I have personally reviewed the labs and test results that are presently available; I have reviewed the patients medication list; I have reviewed the consulting providers response and recommendations. I have reviewed or attempted to review medical records based upon their availability    All of the patient's questions have been  addressed and answered. Patient's is agreeable to the above stated plan. I will continue to monitor closely and make adjustments to medical management as needed.            Danie Ledezma MD   05/02/2022

## 2022-05-03 VITALS
BODY MASS INDEX: 44.97 KG/M2 | OXYGEN SATURATION: 97 % | RESPIRATION RATE: 18 BRPM | HEIGHT: 62 IN | HEART RATE: 78 BPM | DIASTOLIC BLOOD PRESSURE: 88 MMHG | WEIGHT: 244.38 LBS | TEMPERATURE: 98 F | SYSTOLIC BLOOD PRESSURE: 168 MMHG

## 2022-05-03 PROBLEM — J10.1 INFLUENZA A: Status: ACTIVE | Noted: 2022-05-03

## 2022-05-03 LAB — POCT GLUCOSE: 109 MG/DL (ref 70–110)

## 2022-05-03 PROCEDURE — 25000003 PHARM REV CODE 250

## 2022-05-03 RX ORDER — DOXYCYCLINE HYCLATE 100 MG
100 TABLET ORAL
Qty: 20 TABLET | Refills: 0 | Status: SHIPPED | OUTPATIENT
Start: 2022-05-03 | End: 2022-05-13

## 2022-05-03 RX ORDER — OSELTAMIVIR PHOSPHATE 75 MG/1
75 CAPSULE ORAL 2 TIMES DAILY
Qty: 10 CAPSULE | Refills: 0 | Status: SHIPPED | OUTPATIENT
Start: 2022-05-03 | End: 2022-05-08

## 2022-05-03 RX ORDER — PROMETHAZINE HYDROCHLORIDE AND CODEINE PHOSPHATE 6.25; 1 MG/5ML; MG/5ML
5 SOLUTION ORAL EVERY 6 HOURS PRN
Qty: 118 ML | Refills: 0 | Status: SHIPPED | OUTPATIENT
Start: 2022-05-03 | End: 2022-05-09

## 2022-05-03 RX ADMIN — OXYBUTYNIN CHLORIDE 5 MG: 5 TABLET ORAL at 09:05

## 2022-05-03 RX ADMIN — ARIPIPRAZOLE 5 MG: 5 TABLET ORAL at 09:05

## 2022-05-03 RX ADMIN — OSELTAMIVIR PHOSPHATE 75 MG: 75 CAPSULE ORAL at 09:05

## 2022-05-03 RX ADMIN — PANTOPRAZOLE SODIUM 40 MG: 40 TABLET, DELAYED RELEASE ORAL at 09:05

## 2022-05-03 RX ADMIN — FERROUS SULFATE TAB 325 MG (65 MG ELEMENTAL FE) 1 EACH: 325 (65 FE) TAB at 09:05

## 2022-05-03 NOTE — PLAN OF CARE
Problem: Adult Inpatient Plan of Care  Goal: Plan of Care Review  Outcome: Ongoing, Progressing  Goal: Patient-Specific Goal (Individualized)  Outcome: Ongoing, Progressing  Goal: Absence of Hospital-Acquired Illness or Injury  Outcome: Ongoing, Progressing  Goal: Optimal Comfort and Wellbeing  Outcome: Ongoing, Progressing  Goal: Readiness for Transition of Care  Outcome: Ongoing, Progressing     Problem: Bariatric Environmental Safety  Goal: Safety Maintained with Care  Outcome: Ongoing, Progressing     Problem: Infection  Goal: Absence of Infection Signs and Symptoms  Outcome: Ongoing, Progressing     Problem: Fall Injury Risk  Goal: Absence of Fall and Fall-Related Injury  Outcome: Ongoing, Progressing

## 2022-05-09 ENCOUNTER — LAB VISIT (OUTPATIENT)
Dept: LAB | Facility: HOSPITAL | Age: 52
End: 2022-05-09
Attending: FAMILY MEDICINE
Payer: MEDICAID

## 2022-05-09 ENCOUNTER — OFFICE VISIT (OUTPATIENT)
Dept: FAMILY MEDICINE | Facility: CLINIC | Age: 52
End: 2022-05-09

## 2022-05-09 VITALS
SYSTOLIC BLOOD PRESSURE: 97 MMHG | WEIGHT: 244.69 LBS | DIASTOLIC BLOOD PRESSURE: 67 MMHG | BODY MASS INDEX: 46.2 KG/M2 | HEART RATE: 83 BPM | HEIGHT: 61 IN | RESPIRATION RATE: 20 BRPM | OXYGEN SATURATION: 98 % | TEMPERATURE: 98 F

## 2022-05-09 DIAGNOSIS — G47.19 EXCESSIVE DAYTIME SLEEPINESS: ICD-10-CM

## 2022-05-09 DIAGNOSIS — E11.9 TYPE 2 DIABETES MELLITUS WITHOUT COMPLICATION, WITH LONG-TERM CURRENT USE OF INSULIN: ICD-10-CM

## 2022-05-09 DIAGNOSIS — J96.01 ACUTE RESPIRATORY FAILURE WITH HYPOXIA: ICD-10-CM

## 2022-05-09 DIAGNOSIS — Z79.4 TYPE 2 DIABETES MELLITUS WITHOUT COMPLICATION, WITH LONG-TERM CURRENT USE OF INSULIN: ICD-10-CM

## 2022-05-09 DIAGNOSIS — Z09 HOSPITAL DISCHARGE FOLLOW-UP: ICD-10-CM

## 2022-05-09 DIAGNOSIS — Z09 HOSPITAL DISCHARGE FOLLOW-UP: Primary | ICD-10-CM

## 2022-05-09 DIAGNOSIS — N93.8 DUB (DYSFUNCTIONAL UTERINE BLEEDING): ICD-10-CM

## 2022-05-09 DIAGNOSIS — J10.1 INFLUENZA A: ICD-10-CM

## 2022-05-09 PROBLEM — F31.9 BIPOLAR DISORDER: Status: ACTIVE | Noted: 2022-05-09

## 2022-05-09 PROBLEM — E78.1 HYPERTRIGLYCERIDEMIA: Status: ACTIVE | Noted: 2022-05-09

## 2022-05-09 PROBLEM — F25.9 SCHIZOAFFECTIVE DISORDER: Status: ACTIVE | Noted: 2022-05-09

## 2022-05-09 PROBLEM — I10 HYPERTENSION: Status: ACTIVE | Noted: 2022-05-09

## 2022-05-09 PROBLEM — N39.41 URGE INCONTINENCE OF URINE: Status: ACTIVE | Noted: 2022-05-09

## 2022-05-09 PROBLEM — J45.909 ASTHMA: Status: ACTIVE | Noted: 2022-05-09

## 2022-05-09 LAB
ANION GAP SERPL CALC-SCNC: 10 MEQ/L
BASOPHILS # BLD AUTO: 0.04 X10(3)/MCL (ref 0–0.2)
BASOPHILS NFR BLD AUTO: 0.4 %
BUN SERPL-MCNC: 16 MG/DL (ref 9.8–20.1)
CALCIUM SERPL-MCNC: 8.9 MG/DL (ref 8.4–10.2)
CHLORIDE SERPL-SCNC: 100 MMOL/L (ref 98–107)
CO2 SERPL-SCNC: 30 MMOL/L (ref 22–29)
CREAT SERPL-MCNC: 0.7 MG/DL (ref 0.55–1.02)
CREAT UR-MCNC: 78.7 MG/DL (ref 47–110)
CREAT/UREA NIT SERPL: 23
EOSINOPHIL # BLD AUTO: 0.22 X10(3)/MCL (ref 0–0.9)
EOSINOPHIL NFR BLD AUTO: 2.2 %
ERYTHROCYTE [DISTWIDTH] IN BLOOD BY AUTOMATED COUNT: 15.9 % (ref 11.5–17)
EST. AVERAGE GLUCOSE BLD GHB EST-MCNC: 131.2 MG/DL
FERRITIN SERPL-MCNC: 106.26 NG/ML (ref 4.63–204)
GLUCOSE SERPL-MCNC: 128 MG/DL (ref 74–100)
HBA1C MFR BLD: 6.2 %
HCT VFR BLD AUTO: 46.8 % (ref 37–47)
HGB BLD-MCNC: 13.7 GM/DL (ref 12–16)
IMM GRANULOCYTES # BLD AUTO: 0.05 X10(3)/MCL (ref 0–0.02)
IMM GRANULOCYTES NFR BLD AUTO: 0.5 % (ref 0–0.43)
IRON SATN MFR SERPL: 31 % (ref 20–50)
IRON SERPL-MCNC: 81 UG/DL (ref 50–170)
LYMPHOCYTES # BLD AUTO: 2.52 X10(3)/MCL (ref 0.6–4.6)
LYMPHOCYTES NFR BLD AUTO: 25.3 %
MCH RBC QN AUTO: 25.3 PG (ref 27–31)
MCHC RBC AUTO-ENTMCNC: 29.3 MG/DL (ref 33–36)
MCV RBC AUTO: 86.5 FL (ref 80–94)
MICROALBUMIN UR-MCNC: 5.4 UG/ML
MICROALBUMIN/CREAT RATIO PNL UR: 6.9 MG/GM CR (ref 0–30)
MONOCYTES # BLD AUTO: 0.84 X10(3)/MCL (ref 0.1–1.3)
MONOCYTES NFR BLD AUTO: 8.4 %
NEUTROPHILS # BLD AUTO: 6.3 X10(3)/MCL (ref 2.1–9.2)
NEUTROPHILS NFR BLD AUTO: 63.2 %
NRBC BLD AUTO-RTO: 0 %
PLATELET # BLD AUTO: 371 X10(3)/MCL (ref 130–400)
PMV BLD AUTO: 10.4 FL (ref 9.4–12.4)
POTASSIUM SERPL-SCNC: 4.1 MMOL/L (ref 3.5–5.1)
RBC # BLD AUTO: 5.41 X10(6)/MCL (ref 4.2–5.4)
SODIUM SERPL-SCNC: 140 MMOL/L (ref 136–145)
TIBC SERPL-MCNC: 184 UG/DL (ref 70–310)
TIBC SERPL-MCNC: 265 UG/DL (ref 250–450)
TRANSFERRIN SERPL-MCNC: 241 MG/DL (ref 180–382)
WBC # SPEC AUTO: 10 X10(3)/MCL (ref 4.5–11.5)

## 2022-05-09 PROCEDURE — 3074F PR MOST RECENT SYSTOLIC BLOOD PRESSURE < 130 MM HG: ICD-10-PCS | Mod: CPTII,,, | Performed by: FAMILY MEDICINE

## 2022-05-09 PROCEDURE — 1111F DSCHRG MED/CURRENT MED MERGE: CPT | Mod: CPTII,,, | Performed by: FAMILY MEDICINE

## 2022-05-09 PROCEDURE — 99214 PR OFFICE/OUTPT VISIT, EST, LEVL IV, 30-39 MIN: ICD-10-PCS | Mod: ,,, | Performed by: FAMILY MEDICINE

## 2022-05-09 PROCEDURE — 36415 COLL VENOUS BLD VENIPUNCTURE: CPT

## 2022-05-09 PROCEDURE — 1160F PR REVIEW ALL MEDS BY PRESCRIBER/CLIN PHARMACIST DOCUMENTED: ICD-10-PCS | Mod: CPTII,,, | Performed by: FAMILY MEDICINE

## 2022-05-09 PROCEDURE — 82652 VIT D 1 25-DIHYDROXY: CPT

## 2022-05-09 PROCEDURE — 4010F ACE/ARB THERAPY RXD/TAKEN: CPT | Mod: CPTII,,, | Performed by: FAMILY MEDICINE

## 2022-05-09 PROCEDURE — 1159F MED LIST DOCD IN RCRD: CPT | Mod: CPTII,,, | Performed by: FAMILY MEDICINE

## 2022-05-09 PROCEDURE — 4010F PR ACE/ARB THEARPY RXD/TAKEN: ICD-10-PCS | Mod: CPTII,,, | Performed by: FAMILY MEDICINE

## 2022-05-09 PROCEDURE — 3008F PR BODY MASS INDEX (BMI) DOCUMENTED: ICD-10-PCS | Mod: CPTII,,, | Performed by: FAMILY MEDICINE

## 2022-05-09 PROCEDURE — 1111F PR DISCHARGE MEDS RECONCILED W/ CURRENT OUTPATIENT MED LIST: ICD-10-PCS | Mod: CPTII,,, | Performed by: FAMILY MEDICINE

## 2022-05-09 PROCEDURE — 83036 HEMOGLOBIN GLYCOSYLATED A1C: CPT

## 2022-05-09 PROCEDURE — 83540 ASSAY OF IRON: CPT

## 2022-05-09 PROCEDURE — 1159F PR MEDICATION LIST DOCUMENTED IN MEDICAL RECORD: ICD-10-PCS | Mod: CPTII,,, | Performed by: FAMILY MEDICINE

## 2022-05-09 PROCEDURE — 1160F RVW MEDS BY RX/DR IN RCRD: CPT | Mod: CPTII,,, | Performed by: FAMILY MEDICINE

## 2022-05-09 PROCEDURE — 99214 OFFICE O/P EST MOD 30 MIN: CPT | Mod: ,,, | Performed by: FAMILY MEDICINE

## 2022-05-09 PROCEDURE — 85025 COMPLETE CBC W/AUTO DIFF WBC: CPT

## 2022-05-09 PROCEDURE — 3074F SYST BP LT 130 MM HG: CPT | Mod: CPTII,,, | Performed by: FAMILY MEDICINE

## 2022-05-09 PROCEDURE — 3078F PR MOST RECENT DIASTOLIC BLOOD PRESSURE < 80 MM HG: ICD-10-PCS | Mod: CPTII,,, | Performed by: FAMILY MEDICINE

## 2022-05-09 PROCEDURE — 3078F DIAST BP <80 MM HG: CPT | Mod: CPTII,,, | Performed by: FAMILY MEDICINE

## 2022-05-09 PROCEDURE — 82043 UR ALBUMIN QUANTITATIVE: CPT

## 2022-05-09 PROCEDURE — 82728 ASSAY OF FERRITIN: CPT

## 2022-05-09 PROCEDURE — 3008F BODY MASS INDEX DOCD: CPT | Mod: CPTII,,, | Performed by: FAMILY MEDICINE

## 2022-05-09 RX ORDER — ALBUTEROL SULFATE 90 UG/1
AEROSOL, METERED RESPIRATORY (INHALATION)
COMMUNITY
Start: 2022-05-02 | End: 2022-08-19

## 2022-05-09 RX ORDER — DULAGLUTIDE 0.75 MG/.5ML
0.75 INJECTION, SOLUTION SUBCUTANEOUS
COMMUNITY
Start: 2022-02-09 | End: 2022-08-18

## 2022-05-09 RX ORDER — OMEPRAZOLE 20 MG/1
CAPSULE, DELAYED RELEASE ORAL
COMMUNITY
Start: 2021-12-13 | End: 2022-06-28

## 2022-05-09 RX ORDER — TRAZODONE HYDROCHLORIDE 50 MG/1
TABLET ORAL
COMMUNITY
Start: 2021-12-13 | End: 2022-07-28

## 2022-05-09 RX ORDER — LANCETS
EACH MISCELLANEOUS
COMMUNITY
Start: 2021-07-19

## 2022-05-09 RX ORDER — IPRATROPIUM BROMIDE AND ALBUTEROL SULFATE 2.5; .5 MG/3ML; MG/3ML
SOLUTION RESPIRATORY (INHALATION)
COMMUNITY
Start: 2021-12-22

## 2022-05-09 NOTE — PROGRESS NOTES
Helena Vazquez  05/09/2022  [id number]    Rosetta Tran MD  Patient Care Team:  Rosetta Rodriguez MD as PCP - General (Family Medicine)      Chief Complaint:  Chief Complaint   Patient presents with    Follow-up       History of Present Illness:  HPI    52 y.o. female who presents today for hospital discharge follow up. She was admitted on 4/30/22 for acute hypoxic respiratory failure 2/2 influenza A. She received supplemental oxygen, tamiflu and nebs. She does have a history of asthma. States she continues to have shortness of breath and a dry cough when supine. The sob when supine started prior to this hospitalization. I referred her for a sleep study but she did not go. NO f/c, body aches, night sweats. Appetite is still decreased and she still feels fatigued. She sleeps better when sitting up or using a few pillows.     Her friend is present with her. States patient c/o excessive daytime sleepiness, fatigue, menorrhagia. Patient states her cycles are much heavier x mts. She has one cycle monthly and it is 8 days, bleeds heavily the entire time. She has to use diapers and changes it about 5x a day. Pelvic US 7/21 normal. I did refer to gyn at that time but patient did not go.     [place] score of 20. BMI 50.       Review of Systems   Constitutional: Negative for activity change and unexpected weight change.   HENT: Negative for hearing loss, rhinorrhea and trouble swallowing.    Eyes: Negative for discharge and visual disturbance.   Respiratory: Negative for chest tightness and wheezing.    Cardiovascular: Negative for chest pain and palpitations.   Gastrointestinal: Negative for blood in stool, constipation, diarrhea and vomiting.   Endocrine: Negative for polydipsia and polyuria.   Genitourinary: Negative for difficulty urinating, dysuria, hematuria and menstrual problem.   Musculoskeletal: Negative for arthralgias, joint swelling and neck pain.   Neurological: Negative for weakness and headaches.    Psychiatric/Behavioral: Negative for confusion and dysphoric mood.         Health Maintenance  Health Maintenance Topics with due status: Not Due       Topic Last Completion Date    TETANUS VACCINE 11/18/2019    Low Dose Statin 05/05/2022     Health Maintenance Due   Topic Date Due    Hepatitis C Screening  Never done    Cervical Cancer Screening  Never done    COVID-19 Vaccine (1) Never done    Foot Exam  Never done    Eye Exam  Never done    HIV Screening  Never done    Mammogram  Never done    Colorectal Cancer Screening  Never done    Shingles Vaccine (1 of 2) Never done    Hemoglobin A1c  01/05/2022    Lipid Panel  04/07/2022    Diabetes Urine Screening  04/07/2022       Exam:  Vitals:    05/09/22 0854   BP: 97/67   Pulse: 83   Resp: 20   Temp: 97.9 °F (36.6 °C)     Weight: 111 kg (244 lb 11.4 oz)   Body mass index is 46.2 kg/m².      Physical Exam  Constitutional:       General: She is not in acute distress.     Appearance: Normal appearance. She is obese. She is not ill-appearing or diaphoretic.   HENT:      Head: Normocephalic and atraumatic.   Eyes:      Extraocular Movements: Extraocular movements intact.      Conjunctiva/sclera: Conjunctivae normal.   Neck:      Comments: Neck circumference 39 cm  Cardiovascular:      Rate and Rhythm: Normal rate and regular rhythm.      Pulses: Normal pulses.      Heart sounds: Normal heart sounds. No murmur heard.    No gallop.   Pulmonary:      Effort: Pulmonary effort is normal. No respiratory distress.      Breath sounds: Normal breath sounds. No wheezing, rhonchi or rales.   Abdominal:      Palpations: Abdomen is soft.   Skin:     General: Skin is warm and dry.      Coloration: Skin is not jaundiced.      Findings: No rash.   Neurological:      Mental Status: She is alert and oriented to person, place, and time.   Psychiatric:         Mood and Affect: Mood normal.         Behavior: Behavior normal.         Thought Content: Thought content normal.          Judgment: Judgment normal.             Assessment:  Problem List Items Addressed This Visit        ID    Influenza A      Other Visit Diagnoses     Hospital discharge follow-up    -  Primary    Relevant Orders    CBC Auto Differential    Ferritin    Iron and TIBC    Calcitriol (1,25 DI-OH Vitamin D)    Acute respiratory failure with hypoxia        DUB (dysfunctional uterine bleeding)        Relevant Orders    CBC Auto Differential    Ferritin    Iron and TIBC    Calcitriol (1,25 DI-OH Vitamin D)    Ambulatory referral/consult to Obstetrics / Gynecology    Excessive daytime sleepiness        Relevant Orders    CBC Auto Differential    Ambulatory referral/consult to Sleep Disorders    Ferritin    Iron and TIBC    Calcitriol (1,25 DI-OH Vitamin D)    Adult BMI 50.0-59.9 kg/sq m        Relevant Orders    CBC Auto Differential    Ambulatory referral/consult to Sleep Disorders    Ferritin    Iron and TIBC    Calcitriol (1,25 DI-OH Vitamin D)          Plan:  Hospital discharge follow-up  -     CBC Auto Differential; Future; Expected date: 05/16/2022  -     Ferritin; Future; Expected date: 05/16/2022  -     Iron and TIBC; Future; Expected date: 05/16/2022  -     Calcitriol (1,25 DI-OH Vitamin D); Future; Expected date: 05/16/2022    Influenza A    Acute respiratory failure with hypoxia    DUB (dysfunctional uterine bleeding)  -     CBC Auto Differential; Future; Expected date: 05/16/2022  -     Ferritin; Future; Expected date: 05/16/2022  -     Iron and TIBC; Future; Expected date: 05/16/2022  -     Calcitriol (1,25 DI-OH Vitamin D); Future; Expected date: 05/16/2022  -     Ambulatory referral/consult to Obstetrics / Gynecology; Future; Expected date: 05/16/2022    Excessive daytime sleepiness  -     CBC Auto Differential; Future; Expected date: 05/16/2022  -     Ambulatory referral/consult to Sleep Disorders; Future; Expected date: 05/16/2022  -     Ferritin; Future; Expected date: 05/16/2022  -     Iron and TIBC;  Future; Expected date: 05/16/2022  -     Calcitriol (1,25 DI-OH Vitamin D); Future; Expected date: 05/16/2022    Adult BMI 50.0-59.9 kg/sq m  -     CBC Auto Differential; Future; Expected date: 05/16/2022  -     Ambulatory referral/consult to Sleep Disorders; Future; Expected date: 05/16/2022  -     Ferritin; Future; Expected date: 05/16/2022  -     Iron and TIBC; Future; Expected date: 05/16/2022  -     Calcitriol (1,25 DI-OH Vitamin D); Future; Expected date: 05/16/2022      Patient is recovered from a resp failure standpoint. I will refer again for a sleep study. Patient counseled on importance of keeping this appt.   I will also refer to gyn for ongoing DUB. CBC, iron, ferritin ordered.     -Patient's lab results were reviewed and discussed with patient  -Treatment options and alternatives were discussed with the patient. Patient expressed understanding. Patient was given the opportunity to ask questions and be an active participant in their medical care. Patient had no further questions or concerns at this time.       Follow up: Follow up in about 1 week (around 5/16/2022) for dmii with labs.      Care Plan/Goals: Reviewed   Goals    None

## 2022-05-12 NOTE — PHYSICIAN QUERY
PT Name: Helena Vazquez  MR #: [id number]     DOCUMENTATION CLARIFICATION     CDS: Louis Mistry RN CCDS               Contact information: [email or web address]   This form is a permanent document in the medical record.    Query Date: May 12, 2022    By submitting this query, we are merely seeking further clarification of documentation.  Please utilize your independent clinical judgment when addressing the question(s) below.  The Medical Record contains the following:   Indicators   Supporting Clinical Findings Location in Medical Record     x Pneumonia documented CAP 4/29/2022 ED provider notes     x Chest X-Ray/CT Scan No acute cardiopulmonary process identified.   No pleural effusion. Mild bibasilar atelectasis. No acute findings in the chest.  4/29/2022 Chest x-ray  4/30/2022 CTA chest     x PaO2    PaCO2     O2 sat Pts O2 desaturated to the 80s on room air 4/29/2022 ED provider notes     x WBC 4/29/2022 WBC 10.1  4/30/2022 WBC 6.9 Lab values     x Vital Signs T 37.3, -127, RR 15-28, /68  4/29/2022 Vitals    Cultures        x Treatment  Ceftriaxone 1 g IVPB q24h  azythomycin 500mg IVPB q24h  oseltamivir 75 mg PO  BID 4/29- 5/3/2022 Medications    4/30- 5/3/2022 Medication     x Supplemental O2 Supplemental O2 @ 4L 4/29/2022 Vitals    Dysphagia/Swallow study       x Other worsening cough and SOB. Breath sounds: Bilateral, diminished (mild) and Right, base, crackles present, Cough Productive    productive cough with yellow sputum  Influenza A positive 4/29/2022 ED provider notes      5/2/2022 H&P     Provider, please clarify the diagnosis of Pneumonia related to the above clinical indicators:    [  X ] Influenza Pneumonia   [   ] Unspecified Pneumonia   [   ] Other type of pneumonia (please specify): ________   [   ] Pneumonia ruled out   [   ] Other respiratory diagnosis (please specify): _________   [  ] Clinically undetermined         Please document in your progress notes daily for the duration of  treatment, until resolved, and include in your discharge summary.     Form No. [id number]

## 2022-05-13 LAB — 1,25(OH)2D SERPL-MCNC: <8 PG/ML (ref 18–78)

## 2022-05-14 NOTE — DISCHARGE SUMMARY
[facility] Hospital Medicine Discharge Summary    Admit Date: 4/29/2022  Discharge Date and Time: 5/3/2022 4:18 PM  Admitting Physician:   Discharging Physician: Danie Ledezma MD.  Primary Care Physician: Rosetta Tran MD  Consults: None    Discharge Diagnoses:  1. Acute respiratory failure with hypoxia J96.01   2. Influenza A viral pneumonia  3. Non-insulin treated type 2 diabetes mellitus E11.9   4. Hypertension I10   5. Anxiety and depression F41.9   6. Nonalcoholic fatty liver disease K76.0   7. Morbid obesity E66.01   8. Hypokalemia E87.6     Hospital Course:   Ms. Vazquez is a 52 year old WF who presents to the ED with c/o SOB and non-exertional CP x 1 day. She also reports a productive cough with yellow sputum. She felt febrile but never took temperature. She denies hx of COPD or asthma.    Upon arrival into the ED patient was hypoxic with an oxygen saturation of 80% and was placed on 2 L nasal cannula with improvement in oxygenation. Chest x-ray with no acute process. Influenza A positive. Patient was admitted to the hospitalist service for further management of her acute hypoxemia. Pt was treated with Tamiflu and empiric Rocephin and Zithromax with resolution of symptoms. She is afebrile, on room air, and hemodynamically stable for discharge.    Vitals:  VITAL SIGNS: 24 HRS MIN & MAX LAST   No data recorded 98.2 °F (36.8 °C)   No data recorded (!) 168/88   No data recorded  78   No data recorded 18   No data recorded 97 %       Physical Exam:  General: obese WF in no acute distress  Eye: PERRL, EOMI, clear conjunctiva, eyelids normal  HENT: normocephalic, atraumatic  Neck: full range of motion, no thyromegaly  Respiratory: diminished breath sounds bilaterally  Cardiovascular: regular rate and rhythm  Gastrointestinal: soft, non-tender, non-distended with normal bowel sounds  Musculoskeletal: no gross deformity  Integumentary: no rashes or skin lesions  present  Neurologic: cranial nerves grossly intact, no signs of peripheral neurological deficit  Psychiatric: flat affect, depressed    Procedures Performed: No admission procedures for hospital encounter.     Significant Diagnostic Studies: See Full reports for all details    Recent Labs   Lab 05/09/22  1002   WBC 10.0   RBC 5.41*   HGB 13.7   HCT 46.8   MCV 86.5   MCH 25.3*   MCHC 29.3*   RDW 15.9      MPV 10.4       Recent Labs   Lab 05/09/22  1002      K 4.1   CO2 30*   BUN 16.0   CREATININE 0.70   CALCIUM 8.9        Microbiology Results (last 7 days)     ** No results found for the last 168 hours. **           US Pelvis Comp with Transvag NON-OB (xpd  US Pelvic Non-OB w Transvag if needed     REASON FOR STUDY: Essentially uterine bleeding     TECHNIQUE: Transabdominal ultrasound of the pelvis.      COMPARISON: Pelvic ultrasound 1/9/2020      FINDINGS: Mildly limited assessment due to overall image quality.     The uterus measures 9.1 x 4.9 x 5.5 cm. Endometrial stripe about 5 mm  thick. No defined myometrial lesion.     The right ovary measures 2.4 x 2.1 x 2.2 cm. Left ovary measures 2.1 x  2.4 x 3.0 cm. The ovaries have normal appearances.      No significant pelvic free fluid.       IMPRESSION: Mildly limited assessment. Uterus and ovaries are within  normal limits.      Electronically Signed By: Remy Pelletier MD  Date/Time Signed: 07/06/2021 13:53         Medication List      CONTINUE taking these medications    ARIPiprazole 5 MG Tab  Commonly known as: ABILIFY     ferrous gluconate 324 MG tablet  Commonly known as: FERGON     hydroCHLOROthiazide 12.5 mg capsule  Commonly known as: MICROZIDE     JARDIANCE 25 mg tablet  Generic drug: empagliflozin     losartan 100 MG tablet  Commonly known as: COZAAR     oxybutynin 5 MG Tab  Commonly known as: DITROPAN     pioglitazone 30 MG tablet  Commonly known as: ACTOS     venlafaxine 150 MG Cp24  Commonly known as: EFFEXOR-XR        STOP taking these  medications    atorvastatin 40 MG tablet  Commonly known as: LIPITOR     glipiZIDE 10 MG tablet  Commonly known as: GLUCOTROL        ASK your doctor about these medications    doxycycline 100 MG tablet  Commonly known as: VIBRA-TABS  Take 1 tablet (100 mg total) by mouth 2 times daily 2 hours after meal. for 10 days  Ask about: Should I take this medication?     oseltamivir 75 MG capsule  Commonly known as: TAMIFLU  Take 1 capsule (75 mg total) by mouth 2 (two) times daily. for 5 days  Ask about: Should I take this medication?     promethazine-codeine 6.25-10 mg/5 ml 6.25-10 mg/5 mL syrup  Commonly known as: PHENERGAN with CODEINE  Take 5 mLs by mouth every 6 (six) hours as needed for Cough.  Ask about: Should I take this medication?           Where to Get Your Medications      These medications were sent to [facility] [address] [address]    Phone: [phone]   · doxycycline 100 MG tablet  · oseltamivir 75 MG capsule  · promethazine-codeine 6.25-10 mg/5 ml 6.25-10 mg/5 mL syrup          Explained in detail to the patient about the discharge plan, medications, and follow-up visits. Pt understands and agrees with the treatment plan  Discharged Condition: stable  Diet-  diabetic  Medications Per DC med rec  Activities as tolerated  Disposition: home    For further questions contact hospitalist office    Discharge time 35 minutes    For worsening symptoms, chest pain, shortness of breath, increased abdominal pain, high grade fever, stroke or stroke like symptoms, immediately go to the nearest Emergency Room or call 911 as soon as possible.      Danie Sow M.D on 5/14/2022. at 3:27 PM.

## 2022-05-19 ENCOUNTER — DOCUMENTATION ONLY (OUTPATIENT)
Dept: ADMINISTRATIVE | Facility: HOSPITAL | Age: 52
End: 2022-05-19
Payer: MEDICAID

## 2022-05-19 ENCOUNTER — OFFICE VISIT (OUTPATIENT)
Dept: FAMILY MEDICINE | Facility: CLINIC | Age: 52
End: 2022-05-19
Payer: MEDICAID

## 2022-05-19 VITALS
HEART RATE: 87 BPM | RESPIRATION RATE: 18 BRPM | WEIGHT: 244.5 LBS | OXYGEN SATURATION: 98 % | HEIGHT: 61 IN | BODY MASS INDEX: 46.16 KG/M2 | SYSTOLIC BLOOD PRESSURE: 134 MMHG | TEMPERATURE: 98 F | DIASTOLIC BLOOD PRESSURE: 85 MMHG

## 2022-05-19 DIAGNOSIS — E78.1 HYPERTRIGLYCERIDEMIA: ICD-10-CM

## 2022-05-19 DIAGNOSIS — F25.0 SCHIZOAFFECTIVE DISORDER, BIPOLAR TYPE: ICD-10-CM

## 2022-05-19 DIAGNOSIS — I10 PRIMARY HYPERTENSION: ICD-10-CM

## 2022-05-19 DIAGNOSIS — F31.9 BIPOLAR AFFECTIVE DISORDER, REMISSION STATUS UNSPECIFIED: ICD-10-CM

## 2022-05-19 DIAGNOSIS — E11.9 TYPE 2 DIABETES MELLITUS WITHOUT COMPLICATION, WITHOUT LONG-TERM CURRENT USE OF INSULIN: Primary | ICD-10-CM

## 2022-05-19 DIAGNOSIS — J45.909 ASTHMA, UNSPECIFIED ASTHMA SEVERITY, UNSPECIFIED WHETHER COMPLICATED, UNSPECIFIED WHETHER PERSISTENT: ICD-10-CM

## 2022-05-19 PROBLEM — J10.1 INFLUENZA A: Status: RESOLVED | Noted: 2022-05-03 | Resolved: 2022-05-19

## 2022-05-19 PROCEDURE — 3061F PR NEG MICROALBUMINURIA RESULT DOCUMENTED/REVIEW: ICD-10-PCS | Mod: CPTII,,, | Performed by: FAMILY MEDICINE

## 2022-05-19 PROCEDURE — 3008F PR BODY MASS INDEX (BMI) DOCUMENTED: ICD-10-PCS | Mod: CPTII,,, | Performed by: FAMILY MEDICINE

## 2022-05-19 PROCEDURE — 99214 OFFICE O/P EST MOD 30 MIN: CPT | Mod: ,,, | Performed by: FAMILY MEDICINE

## 2022-05-19 PROCEDURE — 3066F PR DOCUMENTATION OF TREATMENT FOR NEPHROPATHY: ICD-10-PCS | Mod: CPTII,,, | Performed by: FAMILY MEDICINE

## 2022-05-19 PROCEDURE — 3061F NEG MICROALBUMINURIA REV: CPT | Mod: CPTII,,, | Performed by: FAMILY MEDICINE

## 2022-05-19 PROCEDURE — 1159F MED LIST DOCD IN RCRD: CPT | Mod: CPTII,,, | Performed by: FAMILY MEDICINE

## 2022-05-19 PROCEDURE — 1160F PR REVIEW ALL MEDS BY PRESCRIBER/CLIN PHARMACIST DOCUMENTED: ICD-10-PCS | Mod: CPTII,,, | Performed by: FAMILY MEDICINE

## 2022-05-19 PROCEDURE — 1111F DSCHRG MED/CURRENT MED MERGE: CPT | Mod: CPTII,,, | Performed by: FAMILY MEDICINE

## 2022-05-19 PROCEDURE — 3066F NEPHROPATHY DOC TX: CPT | Mod: CPTII,,, | Performed by: FAMILY MEDICINE

## 2022-05-19 PROCEDURE — 3008F BODY MASS INDEX DOCD: CPT | Mod: CPTII,,, | Performed by: FAMILY MEDICINE

## 2022-05-19 PROCEDURE — 99214 PR OFFICE/OUTPT VISIT, EST, LEVL IV, 30-39 MIN: ICD-10-PCS | Mod: ,,, | Performed by: FAMILY MEDICINE

## 2022-05-19 PROCEDURE — 4010F PR ACE/ARB THEARPY RXD/TAKEN: ICD-10-PCS | Mod: CPTII,,, | Performed by: FAMILY MEDICINE

## 2022-05-19 PROCEDURE — 1160F RVW MEDS BY RX/DR IN RCRD: CPT | Mod: CPTII,,, | Performed by: FAMILY MEDICINE

## 2022-05-19 PROCEDURE — 1111F PR DISCHARGE MEDS RECONCILED W/ CURRENT OUTPATIENT MED LIST: ICD-10-PCS | Mod: CPTII,,, | Performed by: FAMILY MEDICINE

## 2022-05-19 PROCEDURE — 1159F PR MEDICATION LIST DOCUMENTED IN MEDICAL RECORD: ICD-10-PCS | Mod: CPTII,,, | Performed by: FAMILY MEDICINE

## 2022-05-19 PROCEDURE — 3075F PR MOST RECENT SYSTOLIC BLOOD PRESS GE 130-139MM HG: ICD-10-PCS | Mod: CPTII,,, | Performed by: FAMILY MEDICINE

## 2022-05-19 PROCEDURE — 3079F PR MOST RECENT DIASTOLIC BLOOD PRESSURE 80-89 MM HG: ICD-10-PCS | Mod: CPTII,,, | Performed by: FAMILY MEDICINE

## 2022-05-19 PROCEDURE — 3079F DIAST BP 80-89 MM HG: CPT | Mod: CPTII,,, | Performed by: FAMILY MEDICINE

## 2022-05-19 PROCEDURE — 3075F SYST BP GE 130 - 139MM HG: CPT | Mod: CPTII,,, | Performed by: FAMILY MEDICINE

## 2022-05-19 PROCEDURE — 4010F ACE/ARB THERAPY RXD/TAKEN: CPT | Mod: CPTII,,, | Performed by: FAMILY MEDICINE

## 2022-05-19 NOTE — PROGRESS NOTES
Helena Vazquez  05/19/2022  [id number]    Rosetta Tran MD  Patient Care Team:  Rosetta Rodriguez MD as PCP - General (Family Medicine)      Chief Complaint:  No chief complaint on file.      History of Present Illness:  HPI    52 y.o. female who presents today for f/u with labs. Labs reviewed by me and were discussed with patient. All questions regarding lab results were answered.     DMII: a1c down to 6.2. She is on jardiance 25 mg, glipizide 10 mg bid and actos 30 mg daily, trulicity.     HLD: triglycerides 335. She is on atorvastatin 40 mg. She is compliant with all meds. Will need repeat flp in future    HTN: well controlled on hctz 12.5 mg daily and losartan 100 mg.    Urge Incontinence: on oxybutynin and is working well. NO more incontinence    Bipolar disorder/schizoaffective disorder: seeing psych, stable. doing well on effexor and abilify    Morbid obesity: counseled on weight loss, portion control, exercise. She is not adhering to a diabetic diet and is not exercising despite counseling.     Asthma: well controlled with proair    Excessive daytime sleepiness: referred for sleep study    Review of Systems  General: denies f/c, weight loss, night sweats, decreased appetite  Eye: denies blurred vision, changes in vision  Respiratory: denies sob, wheezing, cough  Cardiovascular: denies chest pain, palpitations, edema  Gastrointestinal: denies abdominal pain, n/v, constipation, diarrhea  Integumentary: denies rashes, pruritis        Health Maintenance  Health Maintenance Topics with due status: Not Due       Topic Last Completion Date    TETANUS VACCINE 11/18/2019    Lipid Panel 02/08/2022     Health Maintenance Due   Topic Date Due    Hepatitis C Screening  Never done    Cervical Cancer Screening  Never done    COVID-19 Vaccine (1) Never done    HIV Screening  Never done    Colorectal Cancer Screening  Never done    Shingles Vaccine (1 of 2) Never done    Mammogram  07/29/2021        Exam:  Vitals:    05/19/22 0809   BP: 134/85   Pulse: 87   Resp: 18   Temp: 98.1 °F (36.7 °C)     Weight: 110.9 kg (244 lb 7.8 oz)   Body mass index is 46.16 kg/m².      Physical Exam    Constitutional: NAD, alert, pleasant  Respiratory: CTAB, no wheezes, rales or rhonchi. No accessory muscle use  Eyes: EOMI  Cardiovascular: RRR, No m/r/g. No JVD. No LE edema  Gastrointestinal: BS+, nontender, nondistended  Integumentary: warm, dry, intact  Psych: AA&Ox3      Assessment:  Problem List Items Addressed This Visit        Psychiatric    Bipolar disorder    Schizoaffective disorder       Pulmonary    Asthma       Cardiac/Vascular    Hypertension    Relevant Orders    Basic Metabolic Panel    Lipid Panel    Hypertriglyceridemia    Relevant Orders    Lipid Panel       Endocrine    BMI 50.0-59.9, adult    Type 2 diabetes mellitus - Primary    Relevant Orders    Basic Metabolic Panel    Hemoglobin A1C    Lipid Panel          Plan:  Type 2 diabetes mellitus without complication, without long-term current use of insulin  -     Basic Metabolic Panel; Future; Expected date: 08/19/2022  -     Hemoglobin A1C; Future; Expected date: 08/19/2022  -     Lipid Panel; Future; Expected date: 08/19/2022    BMI 50.0-59.9, adult    Primary hypertension  -     Basic Metabolic Panel; Future; Expected date: 08/19/2022  -     Lipid Panel; Future; Expected date: 08/19/2022    Bipolar affective disorder, remission status unspecified    Schizoaffective disorder, bipolar type    Asthma, unspecified asthma severity, unspecified whether complicated, unspecified whether persistent    Hypertriglyceridemia  -     Lipid Panel; Future; Expected date: 08/19/2022    - continue current meds and rtc 3 mts with labs.   - - exercise for 30-45 min a day, 5x a week  - eat a total of 1500 calories daily with less than 70 total carbohydrates daily  - use my fitness pal to log foods and track calories  - eat lean meats like chicken, turkey, fish, lean ground  beef. Avoid fried, fatty or processed foods.  - do not eat pasta, bread, rice, potatoes often  - Adhere to a low carb/sugar diet that is also low in fat, but high in protein. Eat foods such as baked chicken, turkey, low fat ground beef, fish. Increase portions of vegetables. Avoid soft drinks, sweet drinks and stick to mainly water.     -Patient's lab results were reviewed and discussed with patient  -Treatment options and alternatives were discussed with the patient. Patient expressed understanding. Patient was given the opportunity to ask questions and be an active participant in their medical care. Patient had no further questions or concerns at this time.       Follow up: Follow up in about 3 months (around 8/19/2022) for routine follow up.      Care Plan/Goals: Reviewed   Goals    None

## 2022-05-25 ENCOUNTER — DOCUMENTATION ONLY (OUTPATIENT)
Dept: ADMINISTRATIVE | Facility: HOSPITAL | Age: 52
End: 2022-05-25
Payer: MEDICAID

## 2022-06-08 DIAGNOSIS — E11.9 TYPE 2 DIABETES MELLITUS WITHOUT COMPLICATION, WITHOUT LONG-TERM CURRENT USE OF INSULIN: Primary | ICD-10-CM

## 2022-06-16 LAB
LEFT EYE DM RETINOPATHY: NEGATIVE
RIGHT EYE DM RETINOPATHY: NEGATIVE

## 2022-06-22 ENCOUNTER — DOCUMENTATION ONLY (OUTPATIENT)
Dept: ADMINISTRATIVE | Facility: HOSPITAL | Age: 52
End: 2022-06-22
Payer: MEDICAID

## 2022-08-16 NOTE — PROGRESS NOTES
Patient ID: [id number]     Chief Complaint: Follow-up        HPI:     Helena Vazquez is a 52 y.o. female here today for a follow up for DM, HTN  Labs reviewed with patient  a1c at goal however still elevated fasting glucose, morbidly obese  Still taking trulicty but never titrated up     DMII: a1c down to 5.6. She is on jardiance 25 mg, glipizide 10 mg bid and actos 30 mg daily, trulicity.     HLD: triglycerides 335. She is on atorvastatin 40 mg. She is compliant with all meds.     HTN: well controlled on hctz 12.5 mg daily and losartan 100 mg.    No acute complaints    ----------------------------  Asthma  Bipolar disorder, unspecified  Diabetes mellitus type 2 in obese  DUB (dysfunctional uterine bleeding)  Edema leg  Family history of breast cancer in sister      Comment:  x2  History of infection due to human papilloma virus (HPV)  HTN (hypertension)  Hypercholesterolemia  Hyperlipidemia  Hyperlipidemia due to type 2 diabetes mellitus  Hypertriglyceridemia  WALTER (iron deficiency anemia)  Microcytic anemia  Morbid obesity  Schizoaffective disorder  Type 2 diabetes mellitus  Type 2 diabetes mellitus with hyperglycemia  Urge incontinence of urine  Vision blurred     Past Surgical History:   Procedure Laterality Date     SECTION  1994     SECTION  1991     SECTION WITH TUBAL LIGATION  1998    CHOLECYSTECTOMY  1989    HERNIA REPAIR         Review of patient's allergies indicates:  No Known Allergies    Outpatient Medications Marked as Taking for the 22 encounter (Office Visit) with NURSE PRACTITIONER, [facility]   Medication Sig Dispense Refill    albuterol (PROVENTIL/VENTOLIN HFA) 90 mcg/actuation inhaler       albuterol-ipratropium (DUO-NEB) 2.5 mg-0.5 mg/3 mL nebulizer solution   See Instructions, INHALE 1 VIAL VIA NEBULIZER FOUR TIMES A DAY, # 90 mL, 5 Refill(s), Pharmacy: [facility], 155, cm, Height/Length Dosing, 21  7:40:00 CST, 119, kg, Weight Dosing, 12/21/21 7:40:00 CST      alcohol antiseptic pads (ALCOHOL SWABS TOP)   alcohol swabs, See Instructions, use alcohol swab on injection site prior to injection, # 1 box(es), 3 Refill(s), Pharmacy: [facility], 155, cm, Height/Length Dosing, 02/09/22 9:59:00 CST, 116.4, kg, Weight Dosing, 02/09/22 9:59...      ARIPiprazole (ABILIFY) 5 MG Tab Take 5 mg by mouth once daily.      blood sugar diagnostic Strp   ONETOUCH ULTRA  STRP, See Instructions, USE TO TEST BLOOD SUGAR ONCE DAILY, # 50 unknown unit, 5 Refill(s), Pharmacy: [facility], 155, cm, Height/Length Dosing, 06/03/21 10:04:00 CDT, 121.7, kg, Weight Dosing, 06/03/21 10:04:00 CDT      ferrous gluconate (FERGON) 324 MG tablet Take 324 mg by mouth 2 (two) times a day.      glipiZIDE (GLUCOTROL) 10 MG tablet TAKE 1 TABLET BY MOUTH 2 TIMES A DAY 60 tablet 3    hydroCHLOROthiazide (MICROZIDE) 12.5 mg capsule TAKE ONE CAPSULE BY MOUTH EVERY DAY 30 capsule 5    JARDIANCE 25 mg tablet TAKE ONE TABLET BY MOUTH EVERY MORNING 30 tablet 3    lancets Misc   TRUEPLUS LANCETS 30G  MISC, See Instructions, USE ONCE DAILY AS DIRECTED, # 100 EA, 3 Refill(s), Pharmacy: [facility], 155, cm, Height/Length Dosing, 07/01/21 9:54:00 CDT, 121.2, kg, Weight Dosing, 07/01/21 9:54:00 CDT      losartan (COZAAR) 100 MG tablet TAKE ONE TABLET BY MOUTH EVERY DAY 30 tablet 1    losartan (COZAAR) 100 MG tablet TAKE ONE TABLET BY MOUTH EVERY DAY 30 tablet 3    losartan (COZAAR) 100 MG tablet TAKE ONE TABLET BY MOUTH EVERY DAY 30 tablet 3    omeprazole (PRILOSEC) 20 MG capsule TAKE 1 CAPSULE BY MOUTH ONCE DAILY 120 capsule 0    oxybutynin (DITROPAN) 5 MG Tab TAKE 1 TABLET BY MOUTH 3 TIMES A DAY 90 tablet 1    oxybutynin (DITROPAN) 5 MG Tab TAKE 1 TABLET BY MOUTH 3 TIMES A DAY 90 tablet 3    oxybutynin (DITROPAN) 5 MG Tab TAKE 1 TABLET BY MOUTH 3 TIMES A DAY 90 tablet 3     "pioglitazone (ACTOS) 30 MG tablet TAKE ONE TABLET BY MOUTH EVERY DAY 30 tablet 3    traZODone (DESYREL) 50 MG tablet TAKE 1 TABLET BY MOUTH AT BEDTIME 30 tablet 1    traZODone (DESYREL) 50 MG tablet TAKE 1 TABLET BY MOUTH AT BEDTIME 30 tablet 3    traZODone (DESYREL) 50 MG tablet TAKE 1 TABLET BY MOUTH AT BEDTIME 30 tablet 3    TRUEPLUS LANCETS 30 gauge Misc USE ONCE DAILY AS DIRECTED 100 each 3    venlafaxine (EFFEXOR-XR) 150 MG Cp24 TAKE ONE CAPSULE BY MOUTH EVERY DAY 30 capsule 5    [DISCONTINUED] atorvastatin (LIPITOR) 40 MG tablet TAKE ONE TABLET BY MOUTH AT BEDTIME 30 tablet 3    [DISCONTINUED] dulaglutide (TRULICITY) 0.75 mg/0.5 mL pen injector Inject 0.75 mg into the skin.         Social History     Socioeconomic History    Marital status:    Tobacco Use    Smoking status: Former Smoker    Smokeless tobacco: Never Used   Substance and Sexual Activity    Alcohol use: Never    Drug use: Never    Sexual activity: Yes     Birth control/protection: Surgical        Family History   Problem Relation Age of Onset    Heart attack Mother     Diabetes Mellitus Mother     Coronary artery disease Mother     Hypertension Mother     Lung cancer Father     Diabetes Mellitus Father     Breast cancer Sister     Breast cancer Sister         Subjective:     ROS      See HPI for details    Constitutional: Denies Change in appetite. Denies Chills. Denies Fever. Denies Night sweats.  Endocrine: Denies Cold intolerance. Denies Excessive thirst. Denies Heat intolerance. Denies Weight loss. Denies Weight gain.  Musculoskeletal: Denies Painful joints. Denies Weakness.  Integumentary: Denies Rash. Denies Itching. Denies Dry skin.    All Other ROS: Negative except as stated in HPI.       Objective:     /86   Pulse 76   Temp 96.8 °F (36 °C) (Tympanic)   Resp 18   Ht 5' 1.02" (1.55 m)   Wt 113.4 kg (250 lb)   SpO2 98%   BMI 47.20 kg/m²     Physical Exam  Constitutional:       Appearance: Normal " appearance.   HENT:      Head: Normocephalic.   Pulmonary:      Effort: Pulmonary effort is normal.   Musculoskeletal:         General: Normal range of motion.   Skin:     General: Skin is warm and dry.   Neurological:      Mental Status: She is alert.   Psychiatric:         Mood and Affect: Mood normal.               Assessment:       ICD-10-CM ICD-9-CM   1. Hypertriglyceridemia  E78.1 272.1   2. Primary hypertension  I10 401.9   3. Type 2 diabetes mellitus with hyperglycemia, without long-term current use of insulin  E11.65 250.00     790.29   4. Hyperlipidemia due to type 2 diabetes mellitus  E11.69 250.80    E78.5 272.4        Plan:     1. Hypertriglyceridemia  - atorvastatin (LIPITOR) 80 MG tablet; Take 1 tablet (80 mg total) by mouth once daily.  Dispense: 90 tablet; Refill: 3  Not at goal, increase statin to 80mg  2. Primary hypertension  At goal, continue current mgmt  3. Type 2 diabetes mellitus with hyperglycemia, without long-term current use of insulin  Increase trulicity to 1.5mg x 4 weeks then to 3mg x 4 weeks then 4.5mg thereafter, as tolerated  Side effects discussed  Call if low blood sugars, may need to d/c actos, glipizide     4. Hyperlipidemia due to type 2 diabetes mellitus  - atorvastatin (LIPITOR) 80 MG tablet; Take 1 tablet (80 mg total) by mouth once daily.  Dispense: 90 tablet; Refill: 3  Not at goal, increase statin to 80mg         Medication List with Changes/Refills   New Medications    ATORVASTATIN (LIPITOR) 80 MG TABLET    Take 1 tablet (80 mg total) by mouth once daily.       Start Date: 8/18/2022 End Date: 8/18/2023    DULAGLUTIDE (TRULICITY) 1.5 MG/0.5 ML PEN INJECTOR    Inject 1.5 mg into the skin every 7 days.       Start Date: 8/18/2022 End Date: 8/18/2023   Current Medications    ALBUTEROL (PROVENTIL/VENTOLIN HFA) 90 MCG/ACTUATION INHALER           Start Date: 5/2/2022  End Date: --    ALBUTEROL-IPRATROPIUM (DUO-NEB) 2.5 MG-0.5 MG/3 ML NEBULIZER SOLUTION      See Instructions,  INHALE 1 VIAL VIA NEBULIZER FOUR TIMES A DAY, # 90 mL, 5 Refill(s), Pharmacy: [facility], 155, cm, Height/Length Dosing, 12/21/21 7:40:00 CST, 119, kg, Weight Dosing, 12/21/21 7:40:00 CST       Start Date: 12/22/2021End Date: --    ALCOHOL ANTISEPTIC PADS (ALCOHOL SWABS TOP)      alcohol swabs, See Instructions, use alcohol swab on injection site prior to injection, # 1 box(es), 3 Refill(s), Pharmacy: [facility], 155, cm, Height/Length Dosing, 02/09/22 9:59:00 CST, 116.4, kg, Weight Dosing, 02/09/22 9:59...       Start Date: 2/9/2022  End Date: --    ARIPIPRAZOLE (ABILIFY) 5 MG TAB    Take 5 mg by mouth once daily.       Start Date: 12/13/2021End Date: --    BLOOD SUGAR DIAGNOSTIC STRP      ONETOUCH ULTRA  STRP, See Instructions, USE TO TEST BLOOD SUGAR ONCE DAILY, # 50 unknown unit, 5 Refill(s), Pharmacy: [facility], 155, cm, Height/Length Dosing, 06/03/21 10:04:00 CDT, 121.7, kg, Weight Dosing, 06/03/21 10:04:00 CDT       Start Date: 6/21/2021 End Date: --    FERROUS GLUCONATE (FERGON) 324 MG TABLET    Take 324 mg by mouth 2 (two) times a day.       Start Date: 10/13/2021End Date: --    GLIPIZIDE (GLUCOTROL) 10 MG TABLET    TAKE 1 TABLET BY MOUTH 2 TIMES A DAY       Start Date: 5/5/2022  End Date: --    HYDROCHLOROTHIAZIDE (MICROZIDE) 12.5 MG CAPSULE    TAKE ONE CAPSULE BY MOUTH EVERY DAY       Start Date: 7/1/2022  End Date: --    JARDIANCE 25 MG TABLET    TAKE ONE TABLET BY MOUTH EVERY MORNING       Start Date: 7/5/2022  End Date: --    LANCETS MISC      TRUEPLUS LANCETS 30G  MISC, See Instructions, USE ONCE DAILY AS DIRECTED, # 100 EA, 3 Refill(s), Pharmacy: [facility], 155, cm, Height/Length Dosing, 07/01/21 9:54:00 CDT, 121.2, kg, Weight Dosing, 07/01/21 9:54:00 CDT       Start Date: 7/19/2021 End Date: --    LOSARTAN (COZAAR) 100 MG TABLET    TAKE ONE TABLET BY MOUTH EVERY DAY       Start Date: 7/28/2022 End Date: --     LOSARTAN (COZAAR) 100 MG TABLET    TAKE ONE TABLET BY MOUTH EVERY DAY       Start Date: 7/29/2022 End Date: --    LOSARTAN (COZAAR) 100 MG TABLET    TAKE ONE TABLET BY MOUTH EVERY DAY       Start Date: 7/27/2022 End Date: --    OMEPRAZOLE (PRILOSEC) 20 MG CAPSULE    TAKE 1 CAPSULE BY MOUTH ONCE DAILY       Start Date: 7/1/2022  End Date: --    OXYBUTYNIN (DITROPAN) 5 MG TAB    TAKE 1 TABLET BY MOUTH 3 TIMES A DAY       Start Date: 7/28/2022 End Date: --    OXYBUTYNIN (DITROPAN) 5 MG TAB    TAKE 1 TABLET BY MOUTH 3 TIMES A DAY       Start Date: 7/27/2022 End Date: --    OXYBUTYNIN (DITROPAN) 5 MG TAB    TAKE 1 TABLET BY MOUTH 3 TIMES A DAY       Start Date: 7/29/2022 End Date: --    PIOGLITAZONE (ACTOS) 30 MG TABLET    TAKE ONE TABLET BY MOUTH EVERY DAY       Start Date: 5/31/2022 End Date: --    TRAZODONE (DESYREL) 50 MG TABLET    TAKE 1 TABLET BY MOUTH AT BEDTIME       Start Date: 7/28/2022 End Date: --    TRAZODONE (DESYREL) 50 MG TABLET    TAKE 1 TABLET BY MOUTH AT BEDTIME       Start Date: 7/27/2022 End Date: --    TRAZODONE (DESYREL) 50 MG TABLET    TAKE 1 TABLET BY MOUTH AT BEDTIME       Start Date: 7/29/2022 End Date: --    TRUEPLUS LANCETS 30 GAUGE MISC    USE ONCE DAILY AS DIRECTED       Start Date: 7/18/2022 End Date: --    VENLAFAXINE (EFFEXOR-XR) 150 MG CP24    TAKE ONE CAPSULE BY MOUTH EVERY DAY       Start Date: 7/1/2022  End Date: --   Discontinued Medications    ATORVASTATIN (LIPITOR) 40 MG TABLET    TAKE ONE TABLET BY MOUTH AT BEDTIME       Start Date: 5/5/2022  End Date: 8/18/2022    DULAGLUTIDE (TRULICITY) 0.75 MG/0.5 ML PEN INJECTOR    Inject 0.75 mg into the skin.       Start Date: 2/9/2022  End Date: 8/18/2022      Dm labs prior     Follow up in about 3 months (around 11/18/2022) for Dm revisit.

## 2022-08-17 ENCOUNTER — LAB VISIT (OUTPATIENT)
Dept: LAB | Facility: HOSPITAL | Age: 52
End: 2022-08-17
Attending: FAMILY MEDICINE
Payer: MEDICAID

## 2022-08-17 DIAGNOSIS — I10 PRIMARY HYPERTENSION: ICD-10-CM

## 2022-08-17 DIAGNOSIS — E11.9 TYPE 2 DIABETES MELLITUS WITHOUT COMPLICATION, WITHOUT LONG-TERM CURRENT USE OF INSULIN: ICD-10-CM

## 2022-08-17 DIAGNOSIS — E78.1 HYPERTRIGLYCERIDEMIA: ICD-10-CM

## 2022-08-17 LAB
ANION GAP SERPL CALC-SCNC: 7 MEQ/L
BUN SERPL-MCNC: 16 MG/DL (ref 9.8–20.1)
CALCIUM SERPL-MCNC: 9.9 MG/DL (ref 8.4–10.2)
CHLORIDE SERPL-SCNC: 103 MMOL/L (ref 98–107)
CHOLEST SERPL-MCNC: 220 MG/DL
CHOLEST/HDLC SERPL: 6 {RATIO} (ref 0–5)
CO2 SERPL-SCNC: 30 MMOL/L (ref 22–29)
CREAT SERPL-MCNC: 0.89 MG/DL (ref 0.55–1.02)
CREAT/UREA NIT SERPL: 18
EST. AVERAGE GLUCOSE BLD GHB EST-MCNC: 114 MG/DL
GFR SERPLBLD CREATININE-BSD FMLA CKD-EPI: >60 MLS/MIN/1.73/M2
GLUCOSE SERPL-MCNC: 117 MG/DL (ref 74–100)
HBA1C MFR BLD: 5.6 %
HDLC SERPL-MCNC: 38 MG/DL (ref 35–60)
LDLC SERPL CALC-MCNC: 115 MG/DL (ref 50–140)
POTASSIUM SERPL-SCNC: 5 MMOL/L (ref 3.5–5.1)
SODIUM SERPL-SCNC: 140 MMOL/L (ref 136–145)
TRIGL SERPL-MCNC: 334 MG/DL (ref 37–140)
VLDLC SERPL CALC-MCNC: 67 MG/DL

## 2022-08-17 PROCEDURE — 36415 COLL VENOUS BLD VENIPUNCTURE: CPT

## 2022-08-17 PROCEDURE — 80048 BASIC METABOLIC PNL TOTAL CA: CPT

## 2022-08-17 PROCEDURE — 80061 LIPID PANEL: CPT

## 2022-08-17 PROCEDURE — 83036 HEMOGLOBIN GLYCOSYLATED A1C: CPT

## 2022-08-18 ENCOUNTER — OFFICE VISIT (OUTPATIENT)
Dept: FAMILY MEDICINE | Facility: CLINIC | Age: 52
End: 2022-08-18
Payer: MEDICAID

## 2022-08-18 VITALS
WEIGHT: 250 LBS | BODY MASS INDEX: 47.2 KG/M2 | OXYGEN SATURATION: 98 % | RESPIRATION RATE: 18 BRPM | SYSTOLIC BLOOD PRESSURE: 139 MMHG | DIASTOLIC BLOOD PRESSURE: 86 MMHG | HEIGHT: 61 IN | TEMPERATURE: 97 F | HEART RATE: 76 BPM

## 2022-08-18 DIAGNOSIS — E78.5 HYPERLIPIDEMIA DUE TO TYPE 2 DIABETES MELLITUS: Chronic | ICD-10-CM

## 2022-08-18 DIAGNOSIS — E11.69 HYPERLIPIDEMIA DUE TO TYPE 2 DIABETES MELLITUS: Chronic | ICD-10-CM

## 2022-08-18 DIAGNOSIS — I10 PRIMARY HYPERTENSION: ICD-10-CM

## 2022-08-18 DIAGNOSIS — E78.1 HYPERTRIGLYCERIDEMIA: Primary | ICD-10-CM

## 2022-08-18 DIAGNOSIS — E11.65 TYPE 2 DIABETES MELLITUS WITH HYPERGLYCEMIA, WITHOUT LONG-TERM CURRENT USE OF INSULIN: ICD-10-CM

## 2022-08-18 PROCEDURE — 3075F SYST BP GE 130 - 139MM HG: CPT | Mod: CPTII,,, | Performed by: NURSE PRACTITIONER

## 2022-08-18 PROCEDURE — 3066F NEPHROPATHY DOC TX: CPT | Mod: CPTII,,, | Performed by: NURSE PRACTITIONER

## 2022-08-18 PROCEDURE — 1159F PR MEDICATION LIST DOCUMENTED IN MEDICAL RECORD: ICD-10-PCS | Mod: CPTII,,, | Performed by: NURSE PRACTITIONER

## 2022-08-18 PROCEDURE — 3079F PR MOST RECENT DIASTOLIC BLOOD PRESSURE 80-89 MM HG: ICD-10-PCS | Mod: CPTII,,, | Performed by: NURSE PRACTITIONER

## 2022-08-18 PROCEDURE — 3079F DIAST BP 80-89 MM HG: CPT | Mod: CPTII,,, | Performed by: NURSE PRACTITIONER

## 2022-08-18 PROCEDURE — 3061F PR NEG MICROALBUMINURIA RESULT DOCUMENTED/REVIEW: ICD-10-PCS | Mod: CPTII,,, | Performed by: NURSE PRACTITIONER

## 2022-08-18 PROCEDURE — 99214 OFFICE O/P EST MOD 30 MIN: CPT | Mod: ,,, | Performed by: NURSE PRACTITIONER

## 2022-08-18 PROCEDURE — 3008F BODY MASS INDEX DOCD: CPT | Mod: CPTII,,, | Performed by: NURSE PRACTITIONER

## 2022-08-18 PROCEDURE — 1160F PR REVIEW ALL MEDS BY PRESCRIBER/CLIN PHARMACIST DOCUMENTED: ICD-10-PCS | Mod: CPTII,,, | Performed by: NURSE PRACTITIONER

## 2022-08-18 PROCEDURE — 1160F RVW MEDS BY RX/DR IN RCRD: CPT | Mod: CPTII,,, | Performed by: NURSE PRACTITIONER

## 2022-08-18 PROCEDURE — 1159F MED LIST DOCD IN RCRD: CPT | Mod: CPTII,,, | Performed by: NURSE PRACTITIONER

## 2022-08-18 PROCEDURE — 3008F PR BODY MASS INDEX (BMI) DOCUMENTED: ICD-10-PCS | Mod: CPTII,,, | Performed by: NURSE PRACTITIONER

## 2022-08-18 PROCEDURE — 3061F NEG MICROALBUMINURIA REV: CPT | Mod: CPTII,,, | Performed by: NURSE PRACTITIONER

## 2022-08-18 PROCEDURE — 3075F PR MOST RECENT SYSTOLIC BLOOD PRESS GE 130-139MM HG: ICD-10-PCS | Mod: CPTII,,, | Performed by: NURSE PRACTITIONER

## 2022-08-18 PROCEDURE — 4010F ACE/ARB THERAPY RXD/TAKEN: CPT | Mod: CPTII,,, | Performed by: NURSE PRACTITIONER

## 2022-08-18 PROCEDURE — 3066F PR DOCUMENTATION OF TREATMENT FOR NEPHROPATHY: ICD-10-PCS | Mod: CPTII,,, | Performed by: NURSE PRACTITIONER

## 2022-08-18 PROCEDURE — 99214 PR OFFICE/OUTPT VISIT, EST, LEVL IV, 30-39 MIN: ICD-10-PCS | Mod: ,,, | Performed by: NURSE PRACTITIONER

## 2022-08-18 PROCEDURE — 4010F PR ACE/ARB THEARPY RXD/TAKEN: ICD-10-PCS | Mod: CPTII,,, | Performed by: NURSE PRACTITIONER

## 2022-08-18 RX ORDER — ATORVASTATIN CALCIUM 80 MG/1
80 TABLET, FILM COATED ORAL DAILY
Qty: 90 TABLET | Refills: 3 | Status: SHIPPED | OUTPATIENT
Start: 2022-08-18 | End: 2023-07-07

## 2022-09-15 DIAGNOSIS — D50.9 IRON DEFICIENCY ANEMIA, UNSPECIFIED IRON DEFICIENCY ANEMIA TYPE: ICD-10-CM

## 2022-09-15 DIAGNOSIS — E11.65 TYPE 2 DIABETES MELLITUS WITH HYPERGLYCEMIA, WITHOUT LONG-TERM CURRENT USE OF INSULIN: Primary | ICD-10-CM

## 2022-09-16 RX ORDER — PIOGLITAZONEHYDROCHLORIDE 30 MG/1
TABLET ORAL
Qty: 30 TABLET | Refills: 3 | Status: SHIPPED | OUTPATIENT
Start: 2022-09-16 | End: 2023-01-03

## 2022-09-16 RX ORDER — FERROUS GLUCONATE 324(38)MG
TABLET ORAL
Qty: 100 TABLET | Refills: 1 | Status: SHIPPED | OUTPATIENT
Start: 2022-09-16 | End: 2022-12-12

## 2022-09-20 DIAGNOSIS — J45.909 ASTHMA, UNSPECIFIED ASTHMA SEVERITY, UNSPECIFIED WHETHER COMPLICATED, UNSPECIFIED WHETHER PERSISTENT: Primary | ICD-10-CM

## 2022-09-20 RX ORDER — ALBUTEROL SULFATE 90 UG/1
AEROSOL, METERED RESPIRATORY (INHALATION)
Qty: 18 G | Refills: 11 | Status: SHIPPED | OUTPATIENT
Start: 2022-09-20 | End: 2023-12-21

## 2022-12-08 DIAGNOSIS — F31.9 BIPOLAR AFFECTIVE DISORDER, REMISSION STATUS UNSPECIFIED: ICD-10-CM

## 2022-12-08 DIAGNOSIS — I10 PRIMARY HYPERTENSION: Primary | ICD-10-CM

## 2022-12-08 RX ORDER — VENLAFAXINE HYDROCHLORIDE 150 MG/1
CAPSULE, EXTENDED RELEASE ORAL
Qty: 30 CAPSULE | Refills: 5 | Status: SHIPPED | OUTPATIENT
Start: 2022-12-08 | End: 2023-12-21

## 2022-12-08 RX ORDER — HYDROCHLOROTHIAZIDE 12.5 MG/1
CAPSULE ORAL
Qty: 30 CAPSULE | Refills: 5 | Status: SHIPPED | OUTPATIENT
Start: 2022-12-08 | End: 2023-08-02

## 2022-12-12 ENCOUNTER — OFFICE VISIT (OUTPATIENT)
Dept: FAMILY MEDICINE | Facility: CLINIC | Age: 52
End: 2022-12-12
Payer: MEDICAID

## 2022-12-12 VITALS
HEIGHT: 61 IN | RESPIRATION RATE: 18 BRPM | TEMPERATURE: 98 F | SYSTOLIC BLOOD PRESSURE: 138 MMHG | HEART RATE: 68 BPM | WEIGHT: 249 LBS | BODY MASS INDEX: 47.01 KG/M2 | OXYGEN SATURATION: 98 % | DIASTOLIC BLOOD PRESSURE: 86 MMHG

## 2022-12-12 DIAGNOSIS — E78.5 HYPERLIPIDEMIA DUE TO TYPE 2 DIABETES MELLITUS: Chronic | ICD-10-CM

## 2022-12-12 DIAGNOSIS — E11.65 TYPE 2 DIABETES MELLITUS WITH HYPERGLYCEMIA, WITHOUT LONG-TERM CURRENT USE OF INSULIN: Primary | ICD-10-CM

## 2022-12-12 DIAGNOSIS — E11.69 HYPERLIPIDEMIA DUE TO TYPE 2 DIABETES MELLITUS: Chronic | ICD-10-CM

## 2022-12-12 DIAGNOSIS — I10 PRIMARY HYPERTENSION: ICD-10-CM

## 2022-12-12 PROCEDURE — 3066F PR DOCUMENTATION OF TREATMENT FOR NEPHROPATHY: ICD-10-PCS | Mod: CPTII,,, | Performed by: FAMILY MEDICINE

## 2022-12-12 PROCEDURE — 1159F MED LIST DOCD IN RCRD: CPT | Mod: CPTII,,, | Performed by: FAMILY MEDICINE

## 2022-12-12 PROCEDURE — 1160F RVW MEDS BY RX/DR IN RCRD: CPT | Mod: CPTII,,, | Performed by: FAMILY MEDICINE

## 2022-12-12 PROCEDURE — 3066F NEPHROPATHY DOC TX: CPT | Mod: CPTII,,, | Performed by: FAMILY MEDICINE

## 2022-12-12 PROCEDURE — 3008F BODY MASS INDEX DOCD: CPT | Mod: CPTII,,, | Performed by: FAMILY MEDICINE

## 2022-12-12 PROCEDURE — 1159F PR MEDICATION LIST DOCUMENTED IN MEDICAL RECORD: ICD-10-PCS | Mod: CPTII,,, | Performed by: FAMILY MEDICINE

## 2022-12-12 PROCEDURE — 3079F PR MOST RECENT DIASTOLIC BLOOD PRESSURE 80-89 MM HG: ICD-10-PCS | Mod: CPTII,,, | Performed by: FAMILY MEDICINE

## 2022-12-12 PROCEDURE — 3079F DIAST BP 80-89 MM HG: CPT | Mod: CPTII,,, | Performed by: FAMILY MEDICINE

## 2022-12-12 PROCEDURE — 3075F SYST BP GE 130 - 139MM HG: CPT | Mod: CPTII,,, | Performed by: FAMILY MEDICINE

## 2022-12-12 PROCEDURE — 4010F PR ACE/ARB THEARPY RXD/TAKEN: ICD-10-PCS | Mod: CPTII,,, | Performed by: FAMILY MEDICINE

## 2022-12-12 PROCEDURE — 99213 OFFICE O/P EST LOW 20 MIN: CPT | Mod: ,,, | Performed by: FAMILY MEDICINE

## 2022-12-12 PROCEDURE — 3075F PR MOST RECENT SYSTOLIC BLOOD PRESS GE 130-139MM HG: ICD-10-PCS | Mod: CPTII,,, | Performed by: FAMILY MEDICINE

## 2022-12-12 PROCEDURE — 1160F PR REVIEW ALL MEDS BY PRESCRIBER/CLIN PHARMACIST DOCUMENTED: ICD-10-PCS | Mod: CPTII,,, | Performed by: FAMILY MEDICINE

## 2022-12-12 PROCEDURE — 99213 PR OFFICE/OUTPT VISIT, EST, LEVL III, 20-29 MIN: ICD-10-PCS | Mod: ,,, | Performed by: FAMILY MEDICINE

## 2022-12-12 PROCEDURE — 3061F PR NEG MICROALBUMINURIA RESULT DOCUMENTED/REVIEW: ICD-10-PCS | Mod: CPTII,,, | Performed by: FAMILY MEDICINE

## 2022-12-12 PROCEDURE — 4010F ACE/ARB THERAPY RXD/TAKEN: CPT | Mod: CPTII,,, | Performed by: FAMILY MEDICINE

## 2022-12-12 PROCEDURE — 3061F NEG MICROALBUMINURIA REV: CPT | Mod: CPTII,,, | Performed by: FAMILY MEDICINE

## 2022-12-12 PROCEDURE — 3008F PR BODY MASS INDEX (BMI) DOCUMENTED: ICD-10-PCS | Mod: CPTII,,, | Performed by: FAMILY MEDICINE

## 2022-12-12 NOTE — PROGRESS NOTES
Patient ID: [id number]     Chief Complaint: Follow-up        HPI:     Helena Vazquez is a 52 y.o. female here today for Follow-up No other complaints today.         ----------------------------  Asthma  Bipolar disorder, unspecified  Diabetes mellitus type 2 in obese  DUB (dysfunctional uterine bleeding)  Edema leg  Family history of breast cancer in sister      Comment:  x2  History of infection due to human papilloma virus (HPV)  HTN (hypertension)  Hypercholesterolemia  Hyperlipidemia  Hyperlipidemia due to type 2 diabetes mellitus  Hypertriglyceridemia  WALTER (iron deficiency anemia)  Microcytic anemia  Morbid obesity  Schizoaffective disorder  Type 2 diabetes mellitus  Type 2 diabetes mellitus with hyperglycemia  Urge incontinence of urine  Vision blurred     Past Surgical History:   Procedure Laterality Date     SECTION  1994     SECTION  1991     SECTION WITH TUBAL LIGATION  1998    CHOLECYSTECTOMY  1989    HERNIA REPAIR         Review of patient's allergies indicates:  No Known Allergies    Outpatient Medications Marked as Taking for the 22 encounter (Office Visit) with Fei Smith, DO   Medication Sig Dispense Refill    albuterol (PROVENTIL/VENTOLIN HFA) 90 mcg/actuation inhaler INHALE 1 PUFF INTO THE LUNGS EVERY 4 HOURS AS NEEDED FOR WHEEZING Strength: 90 mcg/actuation 18 g 11    albuterol-ipratropium (DUO-NEB) 2.5 mg-0.5 mg/3 mL nebulizer solution   See Instructions, INHALE 1 VIAL VIA NEBULIZER FOUR TIMES A DAY, # 90 mL, 5 Refill(s), Pharmacy: [facility], 155, cm, Height/Length Dosing, 21 7:40:00 CST, 119, kg, Weight Dosing, 21 7:40:00 CST      alcohol antiseptic pads (ALCOHOL SWABS TOP)   alcohol swabs, See Instructions, use alcohol swab on injection site prior to injection, # 1 box(es), 3 Refill(s), Pharmacy: [place], 155, cm, Height/Length Dosing, 22 9:59:00 CST, 116.4, kg,  Weight Dosing, 02/09/22 9:59...      ARIPiprazole (ABILIFY) 5 MG Tab Take 5 mg by mouth once daily.      atorvastatin (LIPITOR) 80 MG tablet Take 1 tablet (80 mg total) by mouth once daily. 90 tablet 3    atorvastatin (LIPITOR) 80 MG tablet Take 1 tablet (80 mg total) by mouth every evening. 30 tablet 0    blood sugar diagnostic Strp   ONETOUCH ULTRA  STRP, See Instructions, USE TO TEST BLOOD SUGAR ONCE DAILY, # 50 unknown unit, 5 Refill(s), Pharmacy: [facility], 155, cm, Height/Length Dosing, 06/03/21 10:04:00 CDT, 121.7, kg, Weight Dosing, 06/03/21 10:04:00 CDT      dulaglutide (TRULICITY) 1.5 mg/0.5 mL pen injector Inject 1.5 mg into the skin every 7 days. 4 pen 11    glipiZIDE (GLUCOTROL) 10 MG tablet TAKE 1 TABLET BY MOUTH 2 TIMES A DAY 60 tablet 0    hydroCHLOROthiazide (MICROZIDE) 12.5 mg capsule TAKE ONE CAPSULE BY MOUTH ONCE DAILY 30 capsule 5    JARDIANCE 25 mg tablet TAKE ONE TABLET BY MOUTH EVERY MORNING 30 tablet 3    lancets Misc   TRUEPLUS LANCETS 30G  MISC, See Instructions, USE ONCE DAILY AS DIRECTED, # 100 EA, 3 Refill(s), Pharmacy: [facility], 155, cm, Height/Length Dosing, 07/01/21 9:54:00 CDT, 121.2, kg, Weight Dosing, 07/01/21 9:54:00 CDT      losartan (COZAAR) 100 MG tablet TAKE ONE TABLET BY MOUTH EVERY DAY 30 tablet 1    losartan (COZAAR) 100 MG tablet TAKE ONE TABLET BY MOUTH EVERY DAY 30 tablet 3    losartan (COZAAR) 100 MG tablet TAKE ONE TABLET BY MOUTH EVERY DAY 30 tablet 3    omeprazole (PRILOSEC) 20 MG capsule TAKE ONE CAPSULE BY MOUTH ONCE DAILY 120 capsule 0    oxybutynin (DITROPAN) 5 MG Tab TAKE 1 TABLET BY MOUTH 3 TIMES A DAY 90 tablet 1    oxybutynin (DITROPAN) 5 MG Tab TAKE 1 TABLET BY MOUTH 3 TIMES A DAY 90 tablet 3    oxybutynin (DITROPAN) 5 MG Tab TAKE 1 TABLET BY MOUTH 3 TIMES A DAY 90 tablet 3    pioglitazone (ACTOS) 30 MG tablet TAKE ONE TABLET BY MOUTH ONCE DAILY 30 tablet 3    traZODone (DESYREL) 50 MG  "tablet TAKE 1 TABLET BY MOUTH AT BEDTIME 30 tablet 1    traZODone (DESYREL) 50 MG tablet TAKE 1 TABLET BY MOUTH AT BEDTIME 30 tablet 3    traZODone (DESYREL) 50 MG tablet TAKE 1 TABLET BY MOUTH AT BEDTIME 30 tablet 3    TRUEPLUS LANCETS 30 gauge Misc USE ONCE DAILY AS DIRECTED 100 each 3    venlafaxine (EFFEXOR-XR) 150 MG Cp24 TAKE ONE CAPSULE BY MOUTH ONCE DAILY 30 capsule 5       Social History     Socioeconomic History    Marital status:    Tobacco Use    Smoking status: Former    Smokeless tobacco: Never   Substance and Sexual Activity    Alcohol use: Never    Drug use: Never    Sexual activity: Yes     Birth control/protection: Surgical        Family History   Problem Relation Age of Onset    Heart attack Mother     Diabetes Mellitus Mother     Coronary artery disease Mother     Hypertension Mother     Lung cancer Father     Diabetes Mellitus Father     Breast cancer Sister     Breast cancer Sister         Patient Care Team:  Fei Smith DO as PCP - General (Family Medicine)  [facility] as Consulting Physician (Optometry)  Santo Barrientos NP as Consulting Physician (Psychology)  Karlie Cardozo NP as Consulting Physician (Psychology)     Subjective:     Review of Systems   Constitutional:  Negative for chills and fever.   Respiratory:  Negative for shortness of breath.    Cardiovascular:  Negative for chest pain.   Gastrointestinal:  Negative for abdominal pain, constipation and diarrhea.   Musculoskeletal:  Negative for myalgias.   Neurological:  Negative for dizziness and headaches.     See HPI for details  All Other ROS: Negative except as stated in HPI.       Objective:     /86   Pulse 68   Temp 97.9 °F (36.6 °C) (Tympanic)   Resp 18   Ht 5' 1.02" (1.55 m)   Wt 112.9 kg (249 lb)   SpO2 98%   BMI 47.01 kg/m²     Physical Exam  Vitals reviewed.   Constitutional:       General: She is not in acute distress.     Appearance: Normal appearance. "   Cardiovascular:      Rate and Rhythm: Normal rate and regular rhythm.      Heart sounds: No murmur heard.    No friction rub. No gallop.   Pulmonary:      Effort: No respiratory distress.      Breath sounds: No wheezing, rhonchi or rales.   Skin:     General: Skin is warm and dry.      Findings: No lesion or rash.   Neurological:      General: No focal deficit present.      Mental Status: She is alert.   Psychiatric:         Mood and Affect: Mood normal.       Assessment/Plan:     1. Type 2 diabetes mellitus with hyperglycemia, without long-term current use of insulin  -     Comprehensive Metabolic Panel; Future; Expected date: 12/12/2022  -     Hemoglobin A1C; Future; Expected date: 12/12/2022    2. Hyperlipidemia due to type 2 diabetes mellitus    3. Primary hypertension          Follow up:     Follow up in about 3 months (around 3/12/2023) for Follow up Diabetes. In addition to their scheduled follow up, the patient has also been instructed to follow up on as needed basis.

## 2022-12-13 ENCOUNTER — LAB VISIT (OUTPATIENT)
Dept: LAB | Facility: HOSPITAL | Age: 52
End: 2022-12-13
Attending: FAMILY MEDICINE
Payer: MEDICAID

## 2022-12-13 DIAGNOSIS — E11.65 TYPE 2 DIABETES MELLITUS WITH HYPERGLYCEMIA, WITHOUT LONG-TERM CURRENT USE OF INSULIN: ICD-10-CM

## 2022-12-13 LAB
ALBUMIN SERPL-MCNC: 3.6 GM/DL (ref 3.5–5)
ALBUMIN/GLOB SERPL: 0.8 RATIO (ref 1.1–2)
ALP SERPL-CCNC: 151 UNIT/L (ref 40–150)
ALT SERPL-CCNC: 16 UNIT/L (ref 0–55)
AST SERPL-CCNC: 11 UNIT/L (ref 5–34)
BILIRUBIN DIRECT+TOT PNL SERPL-MCNC: 1 MG/DL
BUN SERPL-MCNC: 12 MG/DL (ref 9.8–20.1)
CALCIUM SERPL-MCNC: 10.4 MG/DL (ref 8.4–10.2)
CHLORIDE SERPL-SCNC: 98 MMOL/L (ref 98–107)
CO2 SERPL-SCNC: 28 MMOL/L (ref 22–29)
CREAT SERPL-MCNC: 0.84 MG/DL (ref 0.55–1.02)
EST. AVERAGE GLUCOSE BLD GHB EST-MCNC: 131.2 MG/DL
GFR SERPLBLD CREATININE-BSD FMLA CKD-EPI: >60 MLS/MIN/1.73/M2
GLOBULIN SER-MCNC: 4.6 GM/DL (ref 2.4–3.5)
GLUCOSE SERPL-MCNC: 143 MG/DL (ref 74–100)
HBA1C MFR BLD: 6.2 %
POTASSIUM SERPL-SCNC: 4.8 MMOL/L (ref 3.5–5.1)
PROT SERPL-MCNC: 8.2 GM/DL (ref 6.4–8.3)
SODIUM SERPL-SCNC: 137 MMOL/L (ref 136–145)

## 2022-12-13 PROCEDURE — 80053 COMPREHEN METABOLIC PANEL: CPT

## 2022-12-13 PROCEDURE — 83036 HEMOGLOBIN GLYCOSYLATED A1C: CPT

## 2022-12-13 PROCEDURE — 36415 COLL VENOUS BLD VENIPUNCTURE: CPT

## 2022-12-19 ENCOUNTER — TELEPHONE (OUTPATIENT)
Dept: FAMILY MEDICINE | Facility: CLINIC | Age: 52
End: 2022-12-19
Payer: MEDICAID

## 2022-12-19 NOTE — TELEPHONE ENCOUNTER
----- Message from Fei Smith DO sent at 12/17/2022  3:31 PM CST -----  Please inform patient of lab results.    1. A1C is a little worse at 6.2 but overall still really good.     Other labwork within acceptable ranges.    Thanks,    Dr. Smith

## 2023-01-01 DIAGNOSIS — E11.65 TYPE 2 DIABETES MELLITUS WITH HYPERGLYCEMIA, WITHOUT LONG-TERM CURRENT USE OF INSULIN: Primary | ICD-10-CM

## 2023-01-05 RX ORDER — GLIPIZIDE 10 MG/1
TABLET ORAL
Qty: 60 TABLET | Refills: 3 | Status: SHIPPED | OUTPATIENT
Start: 2023-01-05 | End: 2023-03-30

## 2023-01-30 DIAGNOSIS — E11.65 TYPE 2 DIABETES MELLITUS WITH HYPERGLYCEMIA, WITHOUT LONG-TERM CURRENT USE OF INSULIN: Primary | ICD-10-CM

## 2023-01-30 RX ORDER — BLOOD SUGAR DIAGNOSTIC
STRIP MISCELLANEOUS
Qty: 50 STRIP | Refills: 5 | Status: SHIPPED | OUTPATIENT
Start: 2023-01-30 | End: 2023-01-31

## 2023-01-31 DIAGNOSIS — E11.65 TYPE 2 DIABETES MELLITUS WITH HYPERGLYCEMIA, WITHOUT LONG-TERM CURRENT USE OF INSULIN: ICD-10-CM

## 2023-01-31 RX ORDER — BLOOD SUGAR DIAGNOSTIC
STRIP MISCELLANEOUS
Qty: 50 STRIP | Refills: 5 | Status: SHIPPED | OUTPATIENT
Start: 2023-01-31 | End: 2023-02-01

## 2023-02-01 DIAGNOSIS — E11.65 TYPE 2 DIABETES MELLITUS WITH HYPERGLYCEMIA, WITHOUT LONG-TERM CURRENT USE OF INSULIN: ICD-10-CM

## 2023-02-01 RX ORDER — BLOOD SUGAR DIAGNOSTIC
STRIP MISCELLANEOUS
Qty: 50 STRIP | Refills: 5 | Status: SHIPPED | OUTPATIENT
Start: 2023-02-01

## 2023-02-13 ENCOUNTER — PATIENT OUTREACH (OUTPATIENT)
Dept: ADMINISTRATIVE | Facility: HOSPITAL | Age: 53
End: 2023-02-13
Payer: MEDICAID

## 2023-02-13 DIAGNOSIS — Z12.31 SCREENING MAMMOGRAM FOR BREAST CANCER: Primary | ICD-10-CM

## 2023-02-13 NOTE — PROGRESS NOTES
Population Health. Out Re[facility]. I called a spoke to pt about yearly screening mmg, pt reports she has not had one done for awhile, but would like to have one ordered. I placed order for screening mmg.

## 2023-03-13 ENCOUNTER — OFFICE VISIT (OUTPATIENT)
Dept: FAMILY MEDICINE | Facility: CLINIC | Age: 53
End: 2023-03-13
Payer: MEDICAID

## 2023-03-13 VITALS
BODY MASS INDEX: 46.29 KG/M2 | SYSTOLIC BLOOD PRESSURE: 135 MMHG | TEMPERATURE: 97 F | DIASTOLIC BLOOD PRESSURE: 85 MMHG | OXYGEN SATURATION: 99 % | WEIGHT: 245.19 LBS | HEIGHT: 61 IN | RESPIRATION RATE: 16 BRPM | HEART RATE: 85 BPM

## 2023-03-13 DIAGNOSIS — I10 PRIMARY HYPERTENSION: Chronic | ICD-10-CM

## 2023-03-13 DIAGNOSIS — E11.9 TYPE 2 DIABETES MELLITUS WITHOUT COMPLICATION, WITHOUT LONG-TERM CURRENT USE OF INSULIN: Primary | ICD-10-CM

## 2023-03-13 PROCEDURE — 1160F PR REVIEW ALL MEDS BY PRESCRIBER/CLIN PHARMACIST DOCUMENTED: ICD-10-PCS | Mod: CPTII,,, | Performed by: FAMILY MEDICINE

## 2023-03-13 PROCEDURE — 3079F PR MOST RECENT DIASTOLIC BLOOD PRESSURE 80-89 MM HG: ICD-10-PCS | Mod: CPTII,,, | Performed by: FAMILY MEDICINE

## 2023-03-13 PROCEDURE — 1159F PR MEDICATION LIST DOCUMENTED IN MEDICAL RECORD: ICD-10-PCS | Mod: CPTII,,, | Performed by: FAMILY MEDICINE

## 2023-03-13 PROCEDURE — 1159F MED LIST DOCD IN RCRD: CPT | Mod: CPTII,,, | Performed by: FAMILY MEDICINE

## 2023-03-13 PROCEDURE — 3008F PR BODY MASS INDEX (BMI) DOCUMENTED: ICD-10-PCS | Mod: CPTII,,, | Performed by: FAMILY MEDICINE

## 2023-03-13 PROCEDURE — 3008F BODY MASS INDEX DOCD: CPT | Mod: CPTII,,, | Performed by: FAMILY MEDICINE

## 2023-03-13 PROCEDURE — 99214 PR OFFICE/OUTPT VISIT, EST, LEVL IV, 30-39 MIN: ICD-10-PCS | Mod: ,,, | Performed by: FAMILY MEDICINE

## 2023-03-13 PROCEDURE — 3079F DIAST BP 80-89 MM HG: CPT | Mod: CPTII,,, | Performed by: FAMILY MEDICINE

## 2023-03-13 PROCEDURE — 3075F SYST BP GE 130 - 139MM HG: CPT | Mod: CPTII,,, | Performed by: FAMILY MEDICINE

## 2023-03-13 PROCEDURE — 1160F RVW MEDS BY RX/DR IN RCRD: CPT | Mod: CPTII,,, | Performed by: FAMILY MEDICINE

## 2023-03-13 PROCEDURE — 3075F PR MOST RECENT SYSTOLIC BLOOD PRESS GE 130-139MM HG: ICD-10-PCS | Mod: CPTII,,, | Performed by: FAMILY MEDICINE

## 2023-03-13 PROCEDURE — 4010F ACE/ARB THERAPY RXD/TAKEN: CPT | Mod: CPTII,,, | Performed by: FAMILY MEDICINE

## 2023-03-13 PROCEDURE — 99214 OFFICE O/P EST MOD 30 MIN: CPT | Mod: ,,, | Performed by: FAMILY MEDICINE

## 2023-03-13 PROCEDURE — 4010F PR ACE/ARB THEARPY RXD/TAKEN: ICD-10-PCS | Mod: CPTII,,, | Performed by: FAMILY MEDICINE

## 2023-03-13 RX ORDER — TRAZODONE HYDROCHLORIDE 100 MG/1
200 TABLET ORAL NIGHTLY PRN
COMMUNITY
Start: 2023-01-30

## 2023-03-13 RX ORDER — ARIPIPRAZOLE 10 MG/1
10 TABLET ORAL DAILY
COMMUNITY
Start: 2023-01-30 | End: 2023-08-09

## 2023-03-13 RX ORDER — LANCETS 33 GAUGE
EACH MISCELLANEOUS
COMMUNITY
Start: 2023-02-05 | End: 2023-05-30

## 2023-03-13 NOTE — PROGRESS NOTES
Patient ID: [id number]     Chief Complaint: Follow-up        HPI:     Helena Vazquez is a 53 y.o. female here today for Follow-up No other complaints today.         ----------------------------  Asthma  Bipolar disorder, unspecified  Diabetes mellitus type 2 in obese  DUB (dysfunctional uterine bleeding)  Edema leg  Family history of breast cancer in sister      Comment:  x2  History of infection due to human papilloma virus (HPV)  HTN (hypertension)  Hypercholesterolemia  Hyperlipidemia  Hyperlipidemia due to type 2 diabetes mellitus  Hypertriglyceridemia  WALTER (iron deficiency anemia)  Microcytic anemia  Morbid obesity  Schizoaffective disorder  Type 2 diabetes mellitus  Type 2 diabetes mellitus with hyperglycemia  Urge incontinence of urine  Vision blurred     Past Surgical History:   Procedure Laterality Date     SECTION  1994     SECTION  1991     SECTION WITH TUBAL LIGATION  1998    CHOLECYSTECTOMY  1989    HERNIA REPAIR         Review of patient's allergies indicates:  No Known Allergies    Outpatient Medications Marked as Taking for the 3/13/23 encounter (Office Visit) with Fei Smith, DO   Medication Sig Dispense Refill    albuterol (PROVENTIL/VENTOLIN HFA) 90 mcg/actuation inhaler INHALE 1 PUFF INTO THE LUNGS EVERY 4 HOURS AS NEEDED FOR WHEEZING Strength: 90 mcg/actuation 18 g 11    albuterol-ipratropium (DUO-NEB) 2.5 mg-0.5 mg/3 mL nebulizer solution   See Instructions, INHALE 1 VIAL VIA NEBULIZER FOUR TIMES A DAY, # 90 mL, 5 Refill(s), Pharmacy: [facility], 155, cm, Height/Length Dosing, 21 7:40:00 CST, 119, kg, Weight Dosing, 21 7:40:00 CST      alcohol antiseptic pads (ALCOHOL SWABS TOP)   alcohol swabs, See Instructions, use alcohol swab on injection site prior to injection, # 1 box(es), 3 Refill(s), Pharmacy: [facility], 155, cm, Height/Length Dosing, 22 9:59:00 CST, 116.4, kg, Weight  Dosing, 02/09/22 9:59...      ARIPiprazole (ABILIFY) 10 MG Tab Take 10 mg by mouth once daily.      atorvastatin (LIPITOR) 80 MG tablet Take 1 tablet (80 mg total) by mouth once daily. 90 tablet 3    atorvastatin (LIPITOR) 80 MG tablet Take 1 tablet (80 mg total) by mouth every evening. 30 tablet 0    dulaglutide (TRULICITY) 1.5 mg/0.5 mL pen injector Inject 1.5 mg into the skin every 7 days. 4 pen 11    glipiZIDE (GLUCOTROL) 10 MG tablet TAKE ONE TABLET BY MOUTH TWICE DAILY 60 tablet 3    hydroCHLOROthiazide (MICROZIDE) 12.5 mg capsule TAKE ONE CAPSULE BY MOUTH ONCE DAILY 30 capsule 5    JARDIANCE 25 mg tablet TAKE ONE TABLET BY MOUTH EVERY MORNING 30 tablet 3    lancets Misc   TRUEPLUS LANCETS 30G  MISC, See Instructions, USE ONCE DAILY AS DIRECTED, # 100 EA, 3 Refill(s), Pharmacy: [facility], 155, cm, Height/Length Dosing, 07/01/21 9:54:00 CDT, 121.2, kg, Weight Dosing, 07/01/21 9:54:00 CDT      losartan (COZAAR) 100 MG tablet TAKE ONE TABLET BY MOUTH EVERY DAY 30 tablet 1    losartan (COZAAR) 100 MG tablet TAKE ONE TABLET BY MOUTH EVERY DAY 30 tablet 3    losartan (COZAAR) 100 MG tablet TAKE ONE TABLET BY MOUTH EVERY DAY 30 tablet 3    omeprazole (PRILOSEC) 20 MG capsule TAKE ONE CAPSULE BY MOUTH ONCE DAILY 120 capsule 0    ONETOUCH DELICA PLUS LANCET 33 gauge Misc       ONETOUCH ULTRA TEST Strp USE TO TEST BLOOD SUGAR ONCE DAILY 50 strip 5    oxybutynin (DITROPAN) 5 MG Tab TAKE 1 TABLET BY MOUTH 3 TIMES A DAY 90 tablet 1    oxybutynin (DITROPAN) 5 MG Tab TAKE 1 TABLET BY MOUTH 3 TIMES A DAY 90 tablet 3    oxybutynin (DITROPAN) 5 MG Tab TAKE 1 TABLET BY MOUTH 3 TIMES A DAY 90 tablet 3    pioglitazone (ACTOS) 30 MG tablet TAKE ONE TABLET BY MOUTH ONCE DAILY 30 tablet 3    traZODone (DESYREL) 100 MG tablet Take 200 mg by mouth nightly as needed.      TRUEPLUS LANCETS 30 gauge Misc USE ONCE DAILY AS DIRECTED 100 each 3    venlafaxine (EFFEXOR-XR) 150 MG Cp24 TAKE ONE CAPSULE BY MOUTH ONCE  "DAILY 30 capsule 5       Social History     Socioeconomic History    Marital status:    Tobacco Use    Smoking status: Former    Smokeless tobacco: Never   Substance and Sexual Activity    Alcohol use: Never    Drug use: Never    Sexual activity: Yes     Birth control/protection: Surgical        Family History   Problem Relation Age of Onset    Heart attack Mother     Diabetes Mellitus Mother     Coronary artery disease Mother     Hypertension Mother     Lung cancer Father     Diabetes Mellitus Father     Breast cancer Sister     Breast cancer Sister         Patient Care Team:  Fei Smith DO as PCP - General (Family Medicine)  [facility] as Consulting Physician (Optometry)  Santo Barrientos NP as Consulting Physician (Psychology)  Karlie Cardozo NP as Consulting Physician (Psychology)  Shelley Connor MA     Subjective:     Review of Systems   Constitutional:  Negative for chills and fever.   Respiratory:  Negative for shortness of breath.    Cardiovascular:  Negative for chest pain.   Gastrointestinal:  Negative for constipation and diarrhea.   Neurological:  Negative for headaches.   Psychiatric/Behavioral:  The patient does not have insomnia.      See HPI for details  All Other ROS: Negative except as stated in HPI.       Objective:     /85   Pulse 85   Temp 97.2 °F (36.2 °C) (Tympanic)   Resp 16   Ht 5' 1.02" (1.55 m)   Wt 111.2 kg (245 lb 3.2 oz)   SpO2 99%   BMI 46.29 kg/m²     Physical Exam  Vitals reviewed.   Constitutional:       General: She is not in acute distress.     Appearance: Normal appearance.   Cardiovascular:      Rate and Rhythm: Normal rate and regular rhythm.      Heart sounds: No murmur heard.    No friction rub. No gallop.   Pulmonary:      Effort: No respiratory distress.      Breath sounds: No wheezing, rhonchi or rales.   Musculoskeletal:         General: No swelling, tenderness or deformity.      Right lower leg: No edema.      Left lower " leg: No edema.   Skin:     General: Skin is warm and dry.      Findings: No lesion or rash.   Neurological:      General: No focal deficit present.      Mental Status: She is alert.   Psychiatric:         Mood and Affect: Mood normal.       Assessment/Plan:     1. Type 2 diabetes mellitus without complication, without long-term current use of insulin  well controlled on current prescription medication    2. Primary hypertension      well controlled on current prescription medication    Labs in 6 months   Follow up:     Follow up in about 6 months (around 9/13/2023). In addition to their scheduled follow up, the patient has also been instructed to follow up on as needed basis.

## 2023-03-30 DIAGNOSIS — E11.65 TYPE 2 DIABETES MELLITUS WITH HYPERGLYCEMIA, WITHOUT LONG-TERM CURRENT USE OF INSULIN: ICD-10-CM

## 2023-03-30 RX ORDER — PIOGLITAZONEHYDROCHLORIDE 30 MG/1
TABLET ORAL
Qty: 30 TABLET | Refills: 5 | Status: SHIPPED | OUTPATIENT
Start: 2023-03-30 | End: 2023-04-25

## 2023-03-30 RX ORDER — GLIPIZIDE 10 MG/1
TABLET ORAL
Qty: 60 TABLET | Refills: 5 | Status: SHIPPED | OUTPATIENT
Start: 2023-03-30 | End: 2023-04-25

## 2023-04-05 ENCOUNTER — PATIENT OUTREACH (OUTPATIENT)
Dept: ADMINISTRATIVE | Facility: HOSPITAL | Age: 53
End: 2023-04-05
Payer: MEDICAID

## 2023-04-05 NOTE — PROGRESS NOTES
Population Health. Out Reach. Reviewing patient's chart for quality metrics.I attempted to contact patient to see if she been able to do her mmg. No answer. Left message.

## 2023-04-25 DIAGNOSIS — E11.65 TYPE 2 DIABETES MELLITUS WITH HYPERGLYCEMIA, WITHOUT LONG-TERM CURRENT USE OF INSULIN: ICD-10-CM

## 2023-04-25 RX ORDER — PIOGLITAZONEHYDROCHLORIDE 30 MG/1
TABLET ORAL
Qty: 30 TABLET | Refills: 5 | Status: SHIPPED | OUTPATIENT
Start: 2023-04-25 | End: 2023-12-21 | Stop reason: SDUPTHER

## 2023-04-25 RX ORDER — GLIPIZIDE 10 MG/1
TABLET ORAL
Qty: 60 TABLET | Refills: 5 | Status: SHIPPED | OUTPATIENT
Start: 2023-04-25 | End: 2023-12-21 | Stop reason: SDUPTHER

## 2023-04-27 DIAGNOSIS — I10 PRIMARY HYPERTENSION: ICD-10-CM

## 2023-04-27 RX ORDER — HYDROCHLOROTHIAZIDE 12.5 MG/1
12.5 CAPSULE ORAL DAILY
Qty: 30 CAPSULE | Refills: 5 | Status: CANCELLED | OUTPATIENT
Start: 2023-04-27

## 2023-05-18 DIAGNOSIS — Z12.39 ENCOUNTER FOR SCREENING FOR MALIGNANT NEOPLASM OF BREAST, UNSPECIFIED SCREENING MODALITY: Primary | ICD-10-CM

## 2023-05-18 DIAGNOSIS — R92.8 ABNORMAL MAMMOGRAM: ICD-10-CM

## 2023-05-21 DIAGNOSIS — K21.9 GASTROESOPHAGEAL REFLUX DISEASE, UNSPECIFIED WHETHER ESOPHAGITIS PRESENT: ICD-10-CM

## 2023-05-21 DIAGNOSIS — E11.65 TYPE 2 DIABETES MELLITUS WITH HYPERGLYCEMIA, WITHOUT LONG-TERM CURRENT USE OF INSULIN: ICD-10-CM

## 2023-05-22 RX ORDER — EMPAGLIFLOZIN 25 MG/1
TABLET, FILM COATED ORAL
Qty: 30 TABLET | Refills: 3 | Status: SHIPPED | OUTPATIENT
Start: 2023-05-22 | End: 2023-05-23

## 2023-05-22 RX ORDER — OMEPRAZOLE 20 MG/1
CAPSULE, DELAYED RELEASE ORAL
Qty: 120 CAPSULE | Refills: 3 | Status: SHIPPED | OUTPATIENT
Start: 2023-05-22 | End: 2023-05-23

## 2023-05-23 DIAGNOSIS — K21.9 GASTROESOPHAGEAL REFLUX DISEASE, UNSPECIFIED WHETHER ESOPHAGITIS PRESENT: ICD-10-CM

## 2023-05-23 DIAGNOSIS — E11.65 TYPE 2 DIABETES MELLITUS WITH HYPERGLYCEMIA, WITHOUT LONG-TERM CURRENT USE OF INSULIN: ICD-10-CM

## 2023-05-23 RX ORDER — EMPAGLIFLOZIN 25 MG/1
TABLET, FILM COATED ORAL
Qty: 30 TABLET | Refills: 3 | Status: SHIPPED | OUTPATIENT
Start: 2023-05-23 | End: 2023-05-24

## 2023-05-23 RX ORDER — OMEPRAZOLE 20 MG/1
CAPSULE, DELAYED RELEASE ORAL
Qty: 120 CAPSULE | Refills: 3 | Status: SHIPPED | OUTPATIENT
Start: 2023-05-23 | End: 2023-05-24

## 2023-05-23 RX ORDER — TRAZODONE HYDROCHLORIDE 50 MG/1
TABLET ORAL
Qty: 30 TABLET | Refills: 3 | OUTPATIENT
Start: 2023-05-23

## 2023-05-23 NOTE — TELEPHONE ENCOUNTER
Request sent to Fei Smith for refill but was sent back to us, this is not a patient of Dr. Castle or Chi.

## 2023-05-24 DIAGNOSIS — E11.65 TYPE 2 DIABETES MELLITUS WITH HYPERGLYCEMIA, WITHOUT LONG-TERM CURRENT USE OF INSULIN: ICD-10-CM

## 2023-05-24 DIAGNOSIS — K21.9 GASTROESOPHAGEAL REFLUX DISEASE, UNSPECIFIED WHETHER ESOPHAGITIS PRESENT: ICD-10-CM

## 2023-05-24 RX ORDER — OMEPRAZOLE 20 MG/1
CAPSULE, DELAYED RELEASE ORAL
Qty: 90 CAPSULE | Refills: 3 | Status: SHIPPED | OUTPATIENT
Start: 2023-05-24 | End: 2023-05-25

## 2023-05-24 RX ORDER — EMPAGLIFLOZIN 25 MG/1
TABLET, FILM COATED ORAL
Qty: 30 TABLET | Refills: 5 | Status: SHIPPED | OUTPATIENT
Start: 2023-05-24 | End: 2023-05-25

## 2023-05-25 DIAGNOSIS — K21.9 GASTROESOPHAGEAL REFLUX DISEASE, UNSPECIFIED WHETHER ESOPHAGITIS PRESENT: ICD-10-CM

## 2023-05-25 DIAGNOSIS — E11.65 TYPE 2 DIABETES MELLITUS WITH HYPERGLYCEMIA, WITHOUT LONG-TERM CURRENT USE OF INSULIN: ICD-10-CM

## 2023-05-25 RX ORDER — EMPAGLIFLOZIN 25 MG/1
TABLET, FILM COATED ORAL
Qty: 30 TABLET | Refills: 5 | Status: SHIPPED | OUTPATIENT
Start: 2023-05-25 | End: 2023-05-26

## 2023-05-25 RX ORDER — OMEPRAZOLE 20 MG/1
CAPSULE, DELAYED RELEASE ORAL
Qty: 90 CAPSULE | Refills: 3 | Status: SHIPPED | OUTPATIENT
Start: 2023-05-25 | End: 2023-05-26

## 2023-05-26 DIAGNOSIS — K21.9 GASTROESOPHAGEAL REFLUX DISEASE, UNSPECIFIED WHETHER ESOPHAGITIS PRESENT: ICD-10-CM

## 2023-05-26 DIAGNOSIS — E11.65 TYPE 2 DIABETES MELLITUS WITH HYPERGLYCEMIA, WITHOUT LONG-TERM CURRENT USE OF INSULIN: ICD-10-CM

## 2023-05-26 RX ORDER — EMPAGLIFLOZIN 25 MG/1
TABLET, FILM COATED ORAL
Qty: 30 TABLET | Refills: 3 | Status: SHIPPED | OUTPATIENT
Start: 2023-05-26

## 2023-05-26 RX ORDER — OMEPRAZOLE 20 MG/1
CAPSULE, DELAYED RELEASE ORAL
Qty: 120 CAPSULE | Refills: 0 | Status: SHIPPED | OUTPATIENT
Start: 2023-05-26 | End: 2023-12-21 | Stop reason: SDUPTHER

## 2023-05-27 DIAGNOSIS — N39.41 URGE INCONTINENCE OF URINE: ICD-10-CM

## 2023-05-27 DIAGNOSIS — E11.9 TYPE 2 DIABETES MELLITUS WITHOUT COMPLICATION, WITHOUT LONG-TERM CURRENT USE OF INSULIN: ICD-10-CM

## 2023-05-27 DIAGNOSIS — I10 HYPERTENSION, UNSPECIFIED TYPE: Primary | ICD-10-CM

## 2023-05-30 RX ORDER — LANCETS 33 GAUGE
EACH MISCELLANEOUS
Qty: 100 EACH | Refills: 3 | Status: SHIPPED | OUTPATIENT
Start: 2023-05-30

## 2023-05-30 RX ORDER — LOSARTAN POTASSIUM 100 MG/1
TABLET ORAL
Qty: 30 TABLET | Refills: 3 | Status: SHIPPED | OUTPATIENT
Start: 2023-05-30 | End: 2023-12-21 | Stop reason: SDUPTHER

## 2023-05-30 RX ORDER — OXYBUTYNIN CHLORIDE 5 MG/1
TABLET ORAL
Qty: 90 TABLET | Refills: 3 | Status: SHIPPED | OUTPATIENT
Start: 2023-05-30 | End: 2023-12-21 | Stop reason: SDUPTHER

## 2023-06-08 ENCOUNTER — LAB VISIT (OUTPATIENT)
Dept: LAB | Facility: HOSPITAL | Age: 53
End: 2023-06-08
Attending: FAMILY MEDICINE
Payer: MEDICAID

## 2023-06-08 DIAGNOSIS — E11.9 TYPE 2 DIABETES MELLITUS WITHOUT COMPLICATION, WITHOUT LONG-TERM CURRENT USE OF INSULIN: Primary | ICD-10-CM

## 2023-06-08 DIAGNOSIS — E78.1 HYPERTRIGLYCERIDEMIA: ICD-10-CM

## 2023-06-08 DIAGNOSIS — I10 PRIMARY HYPERTENSION: ICD-10-CM

## 2023-06-08 DIAGNOSIS — E78.5 HYPERLIPIDEMIA DUE TO TYPE 2 DIABETES MELLITUS: Chronic | ICD-10-CM

## 2023-06-08 DIAGNOSIS — E11.69 HYPERLIPIDEMIA DUE TO TYPE 2 DIABETES MELLITUS: Chronic | ICD-10-CM

## 2023-06-08 DIAGNOSIS — E11.9 TYPE 2 DIABETES MELLITUS WITHOUT COMPLICATION, WITHOUT LONG-TERM CURRENT USE OF INSULIN: ICD-10-CM

## 2023-06-08 DIAGNOSIS — I10 PRIMARY HYPERTENSION: Chronic | ICD-10-CM

## 2023-06-08 LAB
ALBUMIN SERPL-MCNC: 3.7 G/DL (ref 3.5–5)
ALBUMIN/GLOB SERPL: 1 RATIO (ref 1.1–2)
ALP SERPL-CCNC: 140 UNIT/L (ref 40–150)
ALT SERPL-CCNC: 16 UNIT/L (ref 0–55)
AST SERPL-CCNC: 12 UNIT/L (ref 5–34)
BASOPHILS # BLD AUTO: 0.04 X10(3)/MCL
BASOPHILS NFR BLD AUTO: 0.4 %
BILIRUBIN DIRECT+TOT PNL SERPL-MCNC: 0.9 MG/DL
BUN SERPL-MCNC: 12 MG/DL (ref 9.8–20.1)
CALCIUM SERPL-MCNC: 9.9 MG/DL (ref 8.4–10.2)
CHLORIDE SERPL-SCNC: 103 MMOL/L (ref 98–107)
CHOLEST SERPL-MCNC: 198 MG/DL
CHOLEST/HDLC SERPL: 5 {RATIO} (ref 0–5)
CO2 SERPL-SCNC: 28 MMOL/L (ref 22–29)
CREAT SERPL-MCNC: 0.77 MG/DL (ref 0.55–1.02)
EOSINOPHIL # BLD AUTO: 0.36 X10(3)/MCL (ref 0–0.9)
EOSINOPHIL NFR BLD AUTO: 3.6 %
ERYTHROCYTE [DISTWIDTH] IN BLOOD BY AUTOMATED COUNT: 14.7 % (ref 11.5–17)
EST. AVERAGE GLUCOSE BLD GHB EST-MCNC: 116.9 MG/DL
GFR SERPLBLD CREATININE-BSD FMLA CKD-EPI: >60 MLS/MIN/1.73/M2
GLOBULIN SER-MCNC: 3.7 GM/DL (ref 2.4–3.5)
GLUCOSE SERPL-MCNC: 114 MG/DL (ref 74–100)
HBA1C MFR BLD: 5.7 %
HCT VFR BLD AUTO: 47.5 % (ref 37–47)
HDLC SERPL-MCNC: 39 MG/DL (ref 35–60)
HGB BLD-MCNC: 15.2 G/DL (ref 12–16)
IMM GRANULOCYTES # BLD AUTO: 0.04 X10(3)/MCL (ref 0–0.04)
IMM GRANULOCYTES NFR BLD AUTO: 0.4 %
LDLC SERPL CALC-MCNC: 130 MG/DL (ref 50–140)
LYMPHOCYTES # BLD AUTO: 3.09 X10(3)/MCL (ref 0.6–4.6)
LYMPHOCYTES NFR BLD AUTO: 31.3 %
MCH RBC QN AUTO: 27.5 PG (ref 27–31)
MCHC RBC AUTO-ENTMCNC: 32 G/DL (ref 33–36)
MCV RBC AUTO: 86.1 FL (ref 80–94)
MONOCYTES # BLD AUTO: 0.49 X10(3)/MCL (ref 0.1–1.3)
MONOCYTES NFR BLD AUTO: 5 %
NEUTROPHILS # BLD AUTO: 5.85 X10(3)/MCL (ref 2.1–9.2)
NEUTROPHILS NFR BLD AUTO: 59.3 %
NRBC BLD AUTO-RTO: 0 %
PLATELET # BLD AUTO: 319 X10(3)/MCL (ref 130–400)
PMV BLD AUTO: 9.8 FL (ref 7.4–10.4)
POTASSIUM SERPL-SCNC: 3.6 MMOL/L (ref 3.5–5.1)
PROT SERPL-MCNC: 7.4 GM/DL (ref 6.4–8.3)
RBC # BLD AUTO: 5.52 X10(6)/MCL (ref 4.2–5.4)
SODIUM SERPL-SCNC: 140 MMOL/L (ref 136–145)
TRIGL SERPL-MCNC: 143 MG/DL (ref 37–140)
VLDLC SERPL CALC-MCNC: 29 MG/DL
WBC # SPEC AUTO: 9.87 X10(3)/MCL (ref 4.5–11.5)

## 2023-06-08 PROCEDURE — 80061 LIPID PANEL: CPT

## 2023-06-08 PROCEDURE — 85025 COMPLETE CBC W/AUTO DIFF WBC: CPT

## 2023-06-08 PROCEDURE — 83036 HEMOGLOBIN GLYCOSYLATED A1C: CPT

## 2023-06-08 PROCEDURE — 80053 COMPREHEN METABOLIC PANEL: CPT

## 2023-06-08 PROCEDURE — 36415 COLL VENOUS BLD VENIPUNCTURE: CPT

## 2023-06-09 ENCOUNTER — APPOINTMENT (OUTPATIENT)
Dept: LAB | Facility: HOSPITAL | Age: 53
End: 2023-06-09
Attending: FAMILY MEDICINE
Payer: MEDICAID

## 2023-06-09 PROCEDURE — 82043 UR ALBUMIN QUANTITATIVE: CPT | Performed by: FAMILY MEDICINE

## 2023-06-10 LAB
CREAT UR-MCNC: 104.3 MG/DL (ref 47–110)
MICROALBUMIN UR-MCNC: 7.2 UG/ML
MICROALBUMIN/CREAT RATIO PNL UR: 6.9 MG/GM CR (ref 0–30)

## 2023-06-14 ENCOUNTER — OFFICE VISIT (OUTPATIENT)
Dept: FAMILY MEDICINE | Facility: CLINIC | Age: 53
End: 2023-06-14
Payer: MEDICAID

## 2023-06-14 VITALS
WEIGHT: 239.81 LBS | OXYGEN SATURATION: 99 % | TEMPERATURE: 99 F | HEART RATE: 70 BPM | SYSTOLIC BLOOD PRESSURE: 129 MMHG | DIASTOLIC BLOOD PRESSURE: 85 MMHG | RESPIRATION RATE: 18 BRPM | BODY MASS INDEX: 45.28 KG/M2 | HEIGHT: 61 IN

## 2023-06-14 DIAGNOSIS — E11.9 TYPE 2 DIABETES MELLITUS WITHOUT COMPLICATION, WITHOUT LONG-TERM CURRENT USE OF INSULIN: Primary | ICD-10-CM

## 2023-06-14 DIAGNOSIS — E78.5 HYPERLIPIDEMIA DUE TO TYPE 2 DIABETES MELLITUS: Chronic | ICD-10-CM

## 2023-06-14 DIAGNOSIS — I10 PRIMARY HYPERTENSION: Chronic | ICD-10-CM

## 2023-06-14 DIAGNOSIS — E11.69 HYPERLIPIDEMIA DUE TO TYPE 2 DIABETES MELLITUS: Chronic | ICD-10-CM

## 2023-06-14 PROCEDURE — 3008F PR BODY MASS INDEX (BMI) DOCUMENTED: ICD-10-PCS | Mod: CPTII,,, | Performed by: FAMILY MEDICINE

## 2023-06-14 PROCEDURE — 3074F PR MOST RECENT SYSTOLIC BLOOD PRESSURE < 130 MM HG: ICD-10-PCS | Mod: CPTII,,, | Performed by: FAMILY MEDICINE

## 2023-06-14 PROCEDURE — 1160F RVW MEDS BY RX/DR IN RCRD: CPT | Mod: CPTII,,, | Performed by: FAMILY MEDICINE

## 2023-06-14 PROCEDURE — 3061F PR NEG MICROALBUMINURIA RESULT DOCUMENTED/REVIEW: ICD-10-PCS | Mod: CPTII,,, | Performed by: FAMILY MEDICINE

## 2023-06-14 PROCEDURE — 3079F DIAST BP 80-89 MM HG: CPT | Mod: CPTII,,, | Performed by: FAMILY MEDICINE

## 2023-06-14 PROCEDURE — 3061F NEG MICROALBUMINURIA REV: CPT | Mod: CPTII,,, | Performed by: FAMILY MEDICINE

## 2023-06-14 PROCEDURE — 1160F PR REVIEW ALL MEDS BY PRESCRIBER/CLIN PHARMACIST DOCUMENTED: ICD-10-PCS | Mod: CPTII,,, | Performed by: FAMILY MEDICINE

## 2023-06-14 PROCEDURE — 99214 PR OFFICE/OUTPT VISIT, EST, LEVL IV, 30-39 MIN: ICD-10-PCS | Mod: ,,, | Performed by: FAMILY MEDICINE

## 2023-06-14 PROCEDURE — 3074F SYST BP LT 130 MM HG: CPT | Mod: CPTII,,, | Performed by: FAMILY MEDICINE

## 2023-06-14 PROCEDURE — 3079F PR MOST RECENT DIASTOLIC BLOOD PRESSURE 80-89 MM HG: ICD-10-PCS | Mod: CPTII,,, | Performed by: FAMILY MEDICINE

## 2023-06-14 PROCEDURE — 3066F NEPHROPATHY DOC TX: CPT | Mod: CPTII,,, | Performed by: FAMILY MEDICINE

## 2023-06-14 PROCEDURE — 3066F PR DOCUMENTATION OF TREATMENT FOR NEPHROPATHY: ICD-10-PCS | Mod: CPTII,,, | Performed by: FAMILY MEDICINE

## 2023-06-14 PROCEDURE — 3008F BODY MASS INDEX DOCD: CPT | Mod: CPTII,,, | Performed by: FAMILY MEDICINE

## 2023-06-14 PROCEDURE — 1159F MED LIST DOCD IN RCRD: CPT | Mod: CPTII,,, | Performed by: FAMILY MEDICINE

## 2023-06-14 PROCEDURE — 1159F PR MEDICATION LIST DOCUMENTED IN MEDICAL RECORD: ICD-10-PCS | Mod: CPTII,,, | Performed by: FAMILY MEDICINE

## 2023-06-14 PROCEDURE — 4010F ACE/ARB THERAPY RXD/TAKEN: CPT | Mod: CPTII,,, | Performed by: FAMILY MEDICINE

## 2023-06-14 PROCEDURE — 99214 OFFICE O/P EST MOD 30 MIN: CPT | Mod: ,,, | Performed by: FAMILY MEDICINE

## 2023-06-14 PROCEDURE — 4010F PR ACE/ARB THEARPY RXD/TAKEN: ICD-10-PCS | Mod: CPTII,,, | Performed by: FAMILY MEDICINE

## 2023-06-14 NOTE — PROGRESS NOTES
Patient ID: [id number]     Chief Complaint: Follow-up        HPI:     Helena Vazquez is a 53 y.o. female here today for Follow-up No other complaints today.         ----------------------------  Asthma  Bipolar disorder, unspecified  Diabetes mellitus type 2 in obese  DUB (dysfunctional uterine bleeding)  Edema leg  Family history of breast cancer in sister      Comment:  x2  History of infection due to human papilloma virus (HPV)  HTN (hypertension)  Hypercholesterolemia  Hyperlipidemia  Hyperlipidemia due to type 2 diabetes mellitus  Hypertriglyceridemia  WALTER (iron deficiency anemia)  Microcytic anemia  Morbid obesity  Schizoaffective disorder  Type 2 diabetes mellitus  Type 2 diabetes mellitus with hyperglycemia  Urge incontinence of urine  Vision blurred     Past Surgical History:   Procedure Laterality Date     SECTION  1994     SECTION  1991     SECTION WITH TUBAL LIGATION  1998    CHOLECYSTECTOMY  1989    HERNIA REPAIR         Review of patient's allergies indicates:  No Known Allergies    Outpatient Medications Marked as Taking for the 23 encounter (Office Visit) with Fei Smith, DO   Medication Sig Dispense Refill    albuterol (PROVENTIL/VENTOLIN HFA) 90 mcg/actuation inhaler INHALE 1 PUFF INTO THE LUNGS EVERY 4 HOURS AS NEEDED FOR WHEEZING Strength: 90 mcg/actuation 18 g 11    albuterol-ipratropium (DUO-NEB) 2.5 mg-0.5 mg/3 mL nebulizer solution   See Instructions, INHALE 1 VIAL VIA NEBULIZER FOUR TIMES A DAY, # 90 mL, 5 Refill(s), Pharmacy: [facility], 155, cm, Height/Length Dosing, 21 7:40:00 CST, 119, kg, Weight Dosing, 21 7:40:00 CST      alcohol antiseptic pads (ALCOHOL SWABS TOP)   alcohol swabs, See Instructions, use alcohol swab on injection site prior to injection, # 1 box(es), 3 Refill(s), Pharmacy: [facility], 155, cm, Height/Length Dosing, 22 9:59:00 CST, 116.4, kg, Weight  Dosing, 02/09/22 9:59...      ARIPiprazole (ABILIFY) 10 MG Tab Take 10 mg by mouth once daily.      atorvastatin (LIPITOR) 80 MG tablet Take 1 tablet (80 mg total) by mouth once daily. 90 tablet 3    atorvastatin (LIPITOR) 80 MG tablet Take 1 tablet (80 mg total) by mouth every evening. 30 tablet 0    dulaglutide (TRULICITY) 1.5 mg/0.5 mL pen injector Inject 1.5 mg into the skin every 7 days. 4 pen 11    glipiZIDE (GLUCOTROL) 10 MG tablet TAKE ONE TABLET BY MOUTH TWICE DAILY 60 tablet 5    hydroCHLOROthiazide (MICROZIDE) 12.5 mg capsule TAKE ONE CAPSULE BY MOUTH ONCE DAILY 30 capsule 5    JARDIANCE 25 mg tablet TAKE ONE TABLET BY MOUTH EVERY MORNING 30 tablet 3    lancets Misc   TRUEPLUS LANCETS 30G  MISC, See Instructions, USE ONCE DAILY AS DIRECTED, # 100 EA, 3 Refill(s), Pharmacy: [facility], 155, cm, Height/Length Dosing, 07/01/21 9:54:00 CDT, 121.2, kg, Weight Dosing, 07/01/21 9:54:00 CDT      losartan (COZAAR) 100 MG tablet TAKE ONE TABLET BY MOUTH EVERY DAY 30 tablet 1    losartan (COZAAR) 100 MG tablet TAKE ONE TABLET BY MOUTH EVERY DAY 30 tablet 3    losartan (COZAAR) 100 MG tablet TAKE ONE TABLET BY MOUTH ONCE DAILY 30 tablet 3    omeprazole (PRILOSEC) 20 MG capsule TAKE ONE CAPSULE BY MOUTH ONCE DAILY 120 capsule 0    ONETOUCH DELICA PLUS LANCET 33 gauge Misc USE ONCE DAILY AS DIRECTED 100 each 3    ONETOUCH ULTRA TEST Strp USE TO TEST BLOOD SUGAR ONCE DAILY 50 strip 5    oxybutynin (DITROPAN) 5 MG Tab TAKE 1 TABLET BY MOUTH 3 TIMES A DAY 90 tablet 1    oxybutynin (DITROPAN) 5 MG Tab TAKE 1 TABLET BY MOUTH 3 TIMES A DAY 90 tablet 3    oxybutynin (DITROPAN) 5 MG Tab TAKE ONE TABLET BY MOUTH THREE TIMES A DAY 90 tablet 3    pioglitazone (ACTOS) 30 MG tablet TAKE ONE TABLET BY MOUTH ONCE DAILY 30 tablet 5    traZODone (DESYREL) 100 MG tablet Take 200 mg by mouth nightly as needed.      TRUEPLUS LANCETS 30 gauge Misc USE ONCE DAILY AS DIRECTED 100 each 3    venlafaxine (EFFEXOR-XR) 150  MG Cp24 TAKE ONE CAPSULE BY MOUTH ONCE DAILY 30 capsule 5       Social History     Socioeconomic History    Marital status:    Tobacco Use    Smoking status: Former    Smokeless tobacco: Never   Substance and Sexual Activity    Alcohol use: Never    Drug use: Never    Sexual activity: Yes     Birth control/protection: Surgical     Social Determinants of Health     Financial Resource Strain: Low Risk     Difficulty of Paying Living Expenses: Not hard at all   Food Insecurity: No Food Insecurity    Worried About Running Out of Food in the Last Year: Never true    Ran Out of Food in the Last Year: Never true   Transportation Needs: No Transportation Needs    Lack of Transportation (Medical): No    Lack of Transportation (Non-Medical): No   Physical Activity: Insufficiently Active    Days of Exercise per Week: 5 days    Minutes of Exercise per Session: 20 min   Stress: Stress Concern Present    Feeling of Stress : To some extent   Social Connections: Moderately Integrated    Frequency of Communication with Friends and Family: More than three times a week    Frequency of Social Gatherings with Friends and Family: More than three times a week    Attends [employer] Services: More than 4 times per year    Active Member of Clubs or Organizations: Yes    Attends Club or Organization Meetings: 1 to 4 times per year    Marital Status:    Housing Stability: Low Risk     Unable to Pay for Housing in the Last Year: No    Number of Places Lived in the Last Year: 1    Unstable Housing in the Last Year: No        Family History   Problem Relation Age of Onset    Heart attack Mother     Diabetes Mellitus Mother     Coronary artery disease Mother     Hypertension Mother     Lung cancer Father     Diabetes Mellitus Father     Breast cancer Sister     Breast cancer Sister         Patient Care Team:  Fei Smith DO as PCP - General (Family Medicine)  [facility] as Consulting Physician (Optometry)  Santo WYATT  "JOSE ANTONIO Barrientos as Consulting Physician (Psychology)  Karlie Cardozo NP as Consulting Physician (Psychology)  Shelley Connor MA     Subjective:     Review of Systems   Constitutional:  Negative for chills and fever.   Respiratory:  Negative for shortness of breath.    Cardiovascular:  Negative for chest pain.   Gastrointestinal:  Negative for constipation and diarrhea.   Neurological:  Negative for dizziness and headaches.   Psychiatric/Behavioral:  The patient does not have insomnia.      See HPI for details  All Other ROS: Negative except as stated in HPI.       Objective:     /85   Pulse 70   Temp 98.8 °F (37.1 °C) (Tympanic)   Resp 18   Ht 5' 1.02" (1.55 m)   Wt 108.8 kg (239 lb 12.8 oz)   SpO2 99%   BMI 45.27 kg/m²     Physical Exam  Vitals reviewed.   Constitutional:       General: She is not in acute distress.     Appearance: Normal appearance.   Cardiovascular:      Rate and Rhythm: Normal rate and regular rhythm.      Heart sounds: No murmur heard.    No friction rub. No gallop.   Pulmonary:      Effort: No respiratory distress.      Breath sounds: No wheezing, rhonchi or rales.   Musculoskeletal:         General: No swelling, tenderness or deformity.      Right lower leg: No edema.      Left lower leg: No edema.   Skin:     General: Skin is warm and dry.      Findings: No lesion or rash.   Neurological:      General: No focal deficit present.      Mental Status: She is alert.   Psychiatric:         Mood and Affect: Mood normal.       Assessment/Plan:     1. Type 2 diabetes mellitus without complication, without long-term current use of insulin  well controlled on current prescription medication    2. Primary hypertension  well controlled on current prescription medication    3. Hyperlipidemia due to type 2 diabetes mellitus  well controlled on current prescription medication          Follow up:     Follow up in about 6 months (around 12/14/2023) for Wellness. In addition to their scheduled " follow up, the patient has also been instructed to follow up on as needed basis.

## 2023-07-06 ENCOUNTER — HOSPITAL ENCOUNTER (EMERGENCY)
Facility: HOSPITAL | Age: 53
Discharge: HOME | End: 2023-07-06
Attending: GENERAL PRACTICE
Payer: MEDICAID

## 2023-07-06 VITALS
HEART RATE: 108 BPM | WEIGHT: 240 LBS | RESPIRATION RATE: 18 BRPM | DIASTOLIC BLOOD PRESSURE: 100 MMHG | OXYGEN SATURATION: 100 % | BODY MASS INDEX: 44.16 KG/M2 | TEMPERATURE: 98 F | SYSTOLIC BLOOD PRESSURE: 149 MMHG | HEIGHT: 62 IN

## 2023-07-06 DIAGNOSIS — S91.209A AVULSION OF TOENAIL, INITIAL ENCOUNTER: Primary | ICD-10-CM

## 2023-07-06 PROCEDURE — 99284 EMERGENCY DEPT VISIT MOD MDM: CPT

## 2023-07-06 RX ORDER — CEPHALEXIN 500 MG/1
500 CAPSULE ORAL 4 TIMES DAILY
Qty: 28 CAPSULE | Refills: 0 | Status: SHIPPED | OUTPATIENT
Start: 2023-07-06 | End: 2023-07-13

## 2023-07-06 RX ORDER — NAPROXEN 375 MG/1
375 TABLET ORAL 2 TIMES DAILY WITH MEALS
Qty: 60 TABLET | Refills: 0 | Status: SHIPPED | OUTPATIENT
Start: 2023-07-06 | End: 2023-12-21

## 2023-07-06 NOTE — ED PROVIDER NOTES
Encounter Date: 2023       History     Chief Complaint   Patient presents with    Toe Injury     Left great toe injury.  Reports mariano tried to removed the skin from an ingrown toe nail.  X 3 days ago.  Site is swollen and red, blood noted.     Left toe ingrown toenail.  Patient's boyfriend tried to excise and remove the ingrown portion.  In doing so, he removed half of the nail.  The patient states she is experiencing some pain and there is still swelling.    The history is provided by the patient.   Review of patient's allergies indicates:  No Known Allergies  Past Medical History:   Diagnosis Date    Asthma     Bipolar disorder, unspecified     Diabetes mellitus type 2 in obese     DUB (dysfunctional uterine bleeding)     Edema leg     Family history of breast cancer in sister     x2    History of infection due to human papilloma virus (HPV)     HTN (hypertension)     Hypercholesterolemia     Hyperlipidemia     Hyperlipidemia due to type 2 diabetes mellitus 2022    Hypertriglyceridemia     WALTER (iron deficiency anemia)     Microcytic anemia     Morbid obesity     Schizoaffective disorder     Type 2 diabetes mellitus     Type 2 diabetes mellitus with hyperglycemia     Urge incontinence of urine     Vision blurred      Past Surgical History:   Procedure Laterality Date     SECTION  1994     SECTION  1991     SECTION WITH TUBAL LIGATION  1998    CHOLECYSTECTOMY  1989    HERNIA REPAIR       Family History   Problem Relation Age of Onset    Heart attack Mother     Diabetes Mellitus Mother     Coronary artery disease Mother     Hypertension Mother     Lung cancer Father     Diabetes Mellitus Father     Breast cancer Sister     Breast cancer Sister      Social History     Tobacco Use    Smoking status: Former    Smokeless tobacco: Never   Substance Use Topics    Alcohol use: Never    Drug use: Never     Review of Systems   Constitutional: Negative.    HENT:  Negative.     Eyes: Negative.    Respiratory: Negative.     Cardiovascular: Negative.    Gastrointestinal: Negative.    Endocrine: Negative.    Genitourinary: Negative.    Musculoskeletal:  Positive for joint swelling.   Skin:  Positive for wound.   Allergic/Immunologic: Negative.    Neurological: Negative.    Hematological: Negative.    Psychiatric/Behavioral: Negative.     All other systems reviewed and are negative.    Physical Exam     Initial Vitals [07/06/23 1213]   BP Pulse Resp Temp SpO2   (!) 163/137 (!) 115 20 97.5 °F (36.4 °C) 100 %      MAP       --         Physical Exam    Nursing note and vitals reviewed.  Constitutional: She appears well-developed and well-nourished.   HENT:   Head: Normocephalic and atraumatic.   Eyes: EOM are normal. Pupils are equal, round, and reactive to light.   Neck: Neck supple.   Normal range of motion.  Cardiovascular:  Normal rate, regular rhythm, normal heart sounds and intact distal pulses.           Pulmonary/Chest: Breath sounds normal.   Abdominal: Abdomen is soft. Bowel sounds are normal.   Musculoskeletal:         General: Normal range of motion.      Cervical back: Normal range of motion and neck supple.        Feet:       Comments: Removal of the lateral half of the left great toe toenail.  On the lateral margin there does seem to be an area where the ingrown toenail was resulting in infection.  There does not appear to be any gross infection of the nail bed.     Neurological: She is alert and oriented to person, place, and time. She has normal strength. GCS score is 15. GCS eye subscore is 4. GCS verbal subscore is 5. GCS motor subscore is 6.   Psychiatric: She has a normal mood and affect. Her behavior is normal. Judgment and thought content normal.       ED Course   Procedures  Labs Reviewed - No data to display       Imaging Results    None          Medications - No data to display  Medical Decision Making:   ED Management:  There is no infection of the nail  bed.  Just the lateral edge of the toe.  No abscesses appreciated and there is no drainage but it is red.  We are going to start the patient on antibiotics and clean and dress the wound.  I have advised the patient to follow up with her primary care physician                        Clinical Impression:   Final diagnoses:  [D75.083J] Avulsion of toenail, initial encounter (Primary)        ED Disposition Condition    Discharge Stable          ED Prescriptions       Medication Sig Dispense Start Date End Date Auth. Provider    cephALEXin (KEFLEX) 500 MG capsule Take 1 capsule (500 mg total) by mouth 4 (four) times daily. for 7 days 28 capsule 7/6/2023 7/13/2023 Kvng Canales MD    naproxen (NAPROSYN) 375 MG tablet Take 1 tablet (375 mg total) by mouth 2 (two) times daily with meals. 60 tablet 7/6/2023 -- Kvng Canales MD          Follow-up Information       Follow up With Specialties Details Why Contact Info    Fei Smith DO Family Medicine Call in 3 days For wound re-check [address] [facility] [address]  [phone]               Kvng Canales MD  07/06/23 8782

## 2023-07-06 NOTE — ED NOTES
Pt to Ed with left R great toe injury to foot. No deformity, dried blood noted, cleaned toe with hydrogen peroxide & saline. Wrapped foot with sterile gauze and paper tape.

## 2023-07-07 DIAGNOSIS — E78.5 HYPERLIPIDEMIA DUE TO TYPE 2 DIABETES MELLITUS: Chronic | ICD-10-CM

## 2023-07-07 DIAGNOSIS — E11.69 HYPERLIPIDEMIA DUE TO TYPE 2 DIABETES MELLITUS: Chronic | ICD-10-CM

## 2023-07-07 DIAGNOSIS — E78.1 HYPERTRIGLYCERIDEMIA: ICD-10-CM

## 2023-07-07 RX ORDER — ATORVASTATIN CALCIUM 80 MG/1
TABLET, FILM COATED ORAL
Qty: 90 TABLET | Refills: 3 | Status: SHIPPED | OUTPATIENT
Start: 2023-07-07 | End: 2023-12-21 | Stop reason: SDUPTHER

## 2023-07-24 ENCOUNTER — PATIENT MESSAGE (OUTPATIENT)
Dept: ADMINISTRATIVE | Facility: HOSPITAL | Age: 53
End: 2023-07-24
Payer: MEDICAID

## 2023-07-26 ENCOUNTER — TELEPHONE (OUTPATIENT)
Dept: FAMILY MEDICINE | Facility: CLINIC | Age: 53
End: 2023-07-26
Payer: MEDICAID

## 2023-07-26 DIAGNOSIS — E11.9 TYPE 2 DIABETES MELLITUS WITHOUT COMPLICATION, WITHOUT LONG-TERM CURRENT USE OF INSULIN: Primary | ICD-10-CM

## 2023-07-26 NOTE — TELEPHONE ENCOUNTER
----- Message from Elly Barrientos sent at 7/26/2023 10:47 AM CDT -----  Needing refill on diabetic shot trulicity sent to [facility]

## 2023-08-01 ENCOUNTER — TELEPHONE (OUTPATIENT)
Dept: FAMILY MEDICINE | Facility: CLINIC | Age: 53
End: 2023-08-01
Payer: MEDICAID

## 2023-08-01 NOTE — TELEPHONE ENCOUNTER
----- Message from Ava Lara sent at 8/1/2023  8:16 AM CDT -----  Regarding: Mammogram  Mrs Malik called she would like to have her Mammogram done here at [facility], can you please send the order to have it done there       Thanks

## 2023-08-01 NOTE — TELEPHONE ENCOUNTER
Tried to contact pt to notify her that she cannot have a routine mmg screening at [facility] until [facility] clears her. If she wants to try [facility] I can send orders there so she doesn't have to drive all the way to [place] but [facility] is routine screenings only, due to her abnormals she requires diagnostic screening for next MMG.

## 2023-08-02 DIAGNOSIS — I10 PRIMARY HYPERTENSION: ICD-10-CM

## 2023-08-02 RX ORDER — HYDROCHLOROTHIAZIDE 12.5 MG/1
CAPSULE ORAL
Qty: 30 CAPSULE | Refills: 5 | Status: SHIPPED | OUTPATIENT
Start: 2023-08-02 | End: 2024-03-21

## 2023-08-09 ENCOUNTER — LAB VISIT (OUTPATIENT)
Dept: LAB | Facility: HOSPITAL | Age: 53
End: 2023-08-09
Attending: FAMILY MEDICINE
Payer: MEDICAID

## 2023-08-09 ENCOUNTER — OFFICE VISIT (OUTPATIENT)
Dept: FAMILY MEDICINE | Facility: CLINIC | Age: 53
End: 2023-08-09
Payer: MEDICAID

## 2023-08-09 VITALS
BODY MASS INDEX: 44.05 KG/M2 | HEART RATE: 86 BPM | SYSTOLIC BLOOD PRESSURE: 128 MMHG | DIASTOLIC BLOOD PRESSURE: 85 MMHG | TEMPERATURE: 98 F | WEIGHT: 239.38 LBS | HEIGHT: 62 IN | RESPIRATION RATE: 18 BRPM | OXYGEN SATURATION: 97 %

## 2023-08-09 DIAGNOSIS — E11.69 HYPERLIPIDEMIA DUE TO TYPE 2 DIABETES MELLITUS: Chronic | ICD-10-CM

## 2023-08-09 DIAGNOSIS — E11.9 TYPE 2 DIABETES MELLITUS WITHOUT COMPLICATION, WITHOUT LONG-TERM CURRENT USE OF INSULIN: ICD-10-CM

## 2023-08-09 DIAGNOSIS — I10 PRIMARY HYPERTENSION: Chronic | ICD-10-CM

## 2023-08-09 DIAGNOSIS — R10.84 GENERALIZED ABDOMINAL PAIN: Primary | ICD-10-CM

## 2023-08-09 DIAGNOSIS — E78.5 HYPERLIPIDEMIA DUE TO TYPE 2 DIABETES MELLITUS: Chronic | ICD-10-CM

## 2023-08-09 DIAGNOSIS — R10.84 GENERALIZED ABDOMINAL PAIN: ICD-10-CM

## 2023-08-09 LAB
ALBUMIN SERPL-MCNC: 3.6 G/DL (ref 3.5–5)
ALBUMIN/GLOB SERPL: 0.8 RATIO (ref 1.1–2)
ALP SERPL-CCNC: 149 UNIT/L (ref 40–150)
ALT SERPL-CCNC: 13 UNIT/L (ref 0–55)
AMYLASE SERPL-CCNC: 21 UNIT/L (ref 25–125)
APPEARANCE UR: CLEAR
AST SERPL-CCNC: 10 UNIT/L (ref 5–34)
BASOPHILS # BLD AUTO: 0.06 X10(3)/MCL
BASOPHILS NFR BLD AUTO: 0.5 %
BILIRUB SERPL-MCNC: 0.6 MG/DL
BILIRUB UR QL STRIP.AUTO: NEGATIVE
BUN SERPL-MCNC: 11 MG/DL (ref 9.8–20.1)
CALCIUM SERPL-MCNC: 9.9 MG/DL (ref 8.4–10.2)
CHLORIDE SERPL-SCNC: 104 MMOL/L (ref 98–107)
CO2 SERPL-SCNC: 30 MMOL/L (ref 22–29)
COLOR UR: YELLOW
CREAT SERPL-MCNC: 0.79 MG/DL (ref 0.55–1.02)
EOSINOPHIL # BLD AUTO: 0.37 X10(3)/MCL (ref 0–0.9)
EOSINOPHIL NFR BLD AUTO: 2.8 %
ERYTHROCYTE [DISTWIDTH] IN BLOOD BY AUTOMATED COUNT: 14 % (ref 11.5–17)
GFR SERPLBLD CREATININE-BSD FMLA CKD-EPI: >60 MLS/MIN/1.73/M2
GLOBULIN SER-MCNC: 4.5 GM/DL (ref 2.4–3.5)
GLUCOSE SERPL-MCNC: 145 MG/DL (ref 74–100)
GLUCOSE UR QL STRIP.AUTO: ABNORMAL
HCT VFR BLD AUTO: 46.6 % (ref 37–47)
HGB BLD-MCNC: 14.9 G/DL (ref 12–16)
IMM GRANULOCYTES # BLD AUTO: 0.09 X10(3)/MCL (ref 0–0.04)
IMM GRANULOCYTES NFR BLD AUTO: 0.7 %
KETONES UR QL STRIP.AUTO: NEGATIVE
LEUKOCYTE ESTERASE UR QL STRIP.AUTO: NEGATIVE
LIPASE SERPL-CCNC: 14 U/L
LYMPHOCYTES # BLD AUTO: 3.71 X10(3)/MCL (ref 0.6–4.6)
LYMPHOCYTES NFR BLD AUTO: 28 %
MCH RBC QN AUTO: 27.3 PG (ref 27–31)
MCHC RBC AUTO-ENTMCNC: 32 G/DL (ref 33–36)
MCV RBC AUTO: 85.3 FL (ref 80–94)
MONOCYTES # BLD AUTO: 0.68 X10(3)/MCL (ref 0.1–1.3)
MONOCYTES NFR BLD AUTO: 5.1 %
NEUTROPHILS # BLD AUTO: 8.36 X10(3)/MCL (ref 2.1–9.2)
NEUTROPHILS NFR BLD AUTO: 62.9 %
NITRITE UR QL STRIP.AUTO: NEGATIVE
NRBC BLD AUTO-RTO: 0 %
PH UR STRIP.AUTO: 6 [PH]
PLATELET # BLD AUTO: 351 X10(3)/MCL (ref 130–400)
PMV BLD AUTO: 10 FL (ref 7.4–10.4)
POTASSIUM SERPL-SCNC: 4.4 MMOL/L (ref 3.5–5.1)
PROT SERPL-MCNC: 8.1 GM/DL (ref 6.4–8.3)
PROT UR QL STRIP.AUTO: NEGATIVE
RBC # BLD AUTO: 5.46 X10(6)/MCL (ref 4.2–5.4)
RBC UR QL AUTO: NEGATIVE
SODIUM SERPL-SCNC: 143 MMOL/L (ref 136–145)
SP GR UR STRIP.AUTO: 1.01
UROBILINOGEN UR STRIP-ACNC: 0.2
WBC # SPEC AUTO: 13.27 X10(3)/MCL (ref 4.5–11.5)

## 2023-08-09 PROCEDURE — 3074F SYST BP LT 130 MM HG: CPT | Mod: CPTII,,, | Performed by: FAMILY MEDICINE

## 2023-08-09 PROCEDURE — 3074F PR MOST RECENT SYSTOLIC BLOOD PRESSURE < 130 MM HG: ICD-10-PCS | Mod: CPTII,,, | Performed by: FAMILY MEDICINE

## 2023-08-09 PROCEDURE — 81003 URINALYSIS AUTO W/O SCOPE: CPT

## 2023-08-09 PROCEDURE — 99213 OFFICE O/P EST LOW 20 MIN: CPT | Mod: ,,, | Performed by: FAMILY MEDICINE

## 2023-08-09 PROCEDURE — 4010F ACE/ARB THERAPY RXD/TAKEN: CPT | Mod: CPTII,,, | Performed by: FAMILY MEDICINE

## 2023-08-09 PROCEDURE — 3044F HG A1C LEVEL LT 7.0%: CPT | Mod: CPTII,,, | Performed by: FAMILY MEDICINE

## 2023-08-09 PROCEDURE — 99213 PR OFFICE/OUTPT VISIT, EST, LEVL III, 20-29 MIN: ICD-10-PCS | Mod: ,,, | Performed by: FAMILY MEDICINE

## 2023-08-09 PROCEDURE — 82150 ASSAY OF AMYLASE: CPT

## 2023-08-09 PROCEDURE — 1160F RVW MEDS BY RX/DR IN RCRD: CPT | Mod: CPTII,,, | Performed by: FAMILY MEDICINE

## 2023-08-09 PROCEDURE — 3079F PR MOST RECENT DIASTOLIC BLOOD PRESSURE 80-89 MM HG: ICD-10-PCS | Mod: CPTII,,, | Performed by: FAMILY MEDICINE

## 2023-08-09 PROCEDURE — 1159F MED LIST DOCD IN RCRD: CPT | Mod: CPTII,,, | Performed by: FAMILY MEDICINE

## 2023-08-09 PROCEDURE — 36415 COLL VENOUS BLD VENIPUNCTURE: CPT

## 2023-08-09 PROCEDURE — 3008F BODY MASS INDEX DOCD: CPT | Mod: CPTII,,, | Performed by: FAMILY MEDICINE

## 2023-08-09 PROCEDURE — 83690 ASSAY OF LIPASE: CPT

## 2023-08-09 PROCEDURE — 3061F PR NEG MICROALBUMINURIA RESULT DOCUMENTED/REVIEW: ICD-10-PCS | Mod: CPTII,,, | Performed by: FAMILY MEDICINE

## 2023-08-09 PROCEDURE — 1160F PR REVIEW ALL MEDS BY PRESCRIBER/CLIN PHARMACIST DOCUMENTED: ICD-10-PCS | Mod: CPTII,,, | Performed by: FAMILY MEDICINE

## 2023-08-09 PROCEDURE — 1159F PR MEDICATION LIST DOCUMENTED IN MEDICAL RECORD: ICD-10-PCS | Mod: CPTII,,, | Performed by: FAMILY MEDICINE

## 2023-08-09 PROCEDURE — 3066F NEPHROPATHY DOC TX: CPT | Mod: CPTII,,, | Performed by: FAMILY MEDICINE

## 2023-08-09 PROCEDURE — 80053 COMPREHEN METABOLIC PANEL: CPT

## 2023-08-09 PROCEDURE — 85025 COMPLETE CBC W/AUTO DIFF WBC: CPT

## 2023-08-09 PROCEDURE — 3044F PR MOST RECENT HEMOGLOBIN A1C LEVEL <7.0%: ICD-10-PCS | Mod: CPTII,,, | Performed by: FAMILY MEDICINE

## 2023-08-09 PROCEDURE — 3008F PR BODY MASS INDEX (BMI) DOCUMENTED: ICD-10-PCS | Mod: CPTII,,, | Performed by: FAMILY MEDICINE

## 2023-08-09 PROCEDURE — 4010F PR ACE/ARB THEARPY RXD/TAKEN: ICD-10-PCS | Mod: CPTII,,, | Performed by: FAMILY MEDICINE

## 2023-08-09 PROCEDURE — 3061F NEG MICROALBUMINURIA REV: CPT | Mod: CPTII,,, | Performed by: FAMILY MEDICINE

## 2023-08-09 PROCEDURE — 3079F DIAST BP 80-89 MM HG: CPT | Mod: CPTII,,, | Performed by: FAMILY MEDICINE

## 2023-08-09 PROCEDURE — 3066F PR DOCUMENTATION OF TREATMENT FOR NEPHROPATHY: ICD-10-PCS | Mod: CPTII,,, | Performed by: FAMILY MEDICINE

## 2023-08-09 NOTE — PROGRESS NOTES
Patient ID: [id number]     Chief Complaint: Abdominal Pain (Worsening epigastric and lower abd pain x 2 weeks with nausea, fever, weakness)        HPI:     Helena Vazquez is a 53 y.o. female here today for Abdominal Pain (Worsening epigastric and lower abd pain x 2 weeks with nausea, fever, weakness). Patient has not taken anything for symptoms other than ibuprofen which did not help. Pain does not get better with bowel movements.     ----------------------------  Asthma  Bipolar disorder, unspecified  Diabetes mellitus type 2 in obese  DUB (dysfunctional uterine bleeding)  Edema leg  Family history of breast cancer in sister      Comment:  x2  History of infection due to human papilloma virus (HPV)  HTN (hypertension)  Hypercholesterolemia  Hyperlipidemia  Hyperlipidemia due to type 2 diabetes mellitus  Hypertriglyceridemia  WALTER (iron deficiency anemia)  Microcytic anemia  Morbid obesity  Schizoaffective disorder  Type 2 diabetes mellitus  Type 2 diabetes mellitus with hyperglycemia  Urge incontinence of urine  Vision blurred     Past Surgical History:   Procedure Laterality Date     SECTION  1994     SECTION  1991     SECTION WITH TUBAL LIGATION  1998    CHOLECYSTECTOMY  1989    HERNIA REPAIR         Review of patient's allergies indicates:  No Known Allergies    Outpatient Medications Marked as Taking for the 23 encounter (Office Visit) with Fei Smith, DO   Medication Sig Dispense Refill    albuterol (PROVENTIL/VENTOLIN HFA) 90 mcg/actuation inhaler INHALE 1 PUFF INTO THE LUNGS EVERY 4 HOURS AS NEEDED FOR WHEEZING Strength: 90 mcg/actuation 18 g 11    albuterol-ipratropium (DUO-NEB) 2.5 mg-0.5 mg/3 mL nebulizer solution   See Instructions, INHALE 1 VIAL VIA NEBULIZER FOUR TIMES A DAY, # 90 mL, 5 Refill(s), Pharmacy: [facility], 155, cm, Height/Length Dosing, 21 7:40:00 CST, 119, kg, Weight Dosing, 21 7:40:00 CST       alcohol antiseptic pads (ALCOHOL SWABS TOP)   alcohol swabs, See Instructions, use alcohol swab on injection site prior to injection, # 1 box(es), 3 Refill(s), Pharmacy: [facility], 155, cm, Height/Length Dosing, 02/09/22 9:59:00 CST, 116.4, kg, Weight Dosing, 02/09/22 9:59...      atorvastatin (LIPITOR) 80 MG tablet Take 1 tablet (80 mg total) by mouth every evening. 30 tablet 0    atorvastatin (LIPITOR) 80 MG tablet TAKE 1 TABLET BY MOUTH ONCE DAILY 90 tablet 3    dulaglutide (TRULICITY) 1.5 mg/0.5 mL pen injector Inject 1.5 mg into the skin every 7 days. 4 pen 11    glipiZIDE (GLUCOTROL) 10 MG tablet TAKE ONE TABLET BY MOUTH TWICE DAILY 60 tablet 5    hydroCHLOROthiazide (MICROZIDE) 12.5 mg capsule TAKE ONE CAPSULE BY MOUTH ONCE DAILY 30 capsule 5    JARDIANCE 25 mg tablet TAKE ONE TABLET BY MOUTH EVERY MORNING 30 tablet 3    lancets Misc   TRUEPLUS LANCETS 30G  MISC, See Instructions, USE ONCE DAILY AS DIRECTED, # 100 EA, 3 Refill(s), Pharmacy: [facility], 155, cm, Height/Length Dosing, 07/01/21 9:54:00 CDT, 121.2, kg, Weight Dosing, 07/01/21 9:54:00 CDT      losartan (COZAAR) 100 MG tablet TAKE ONE TABLET BY MOUTH EVERY DAY 30 tablet 1    losartan (COZAAR) 100 MG tablet TAKE ONE TABLET BY MOUTH EVERY DAY 30 tablet 3    losartan (COZAAR) 100 MG tablet TAKE ONE TABLET BY MOUTH ONCE DAILY 30 tablet 3    naproxen (NAPROSYN) 375 MG tablet Take 1 tablet (375 mg total) by mouth 2 (two) times daily with meals. 60 tablet 0    omeprazole (PRILOSEC) 20 MG capsule TAKE ONE CAPSULE BY MOUTH ONCE DAILY 120 capsule 0    ONETOUCH DELICA PLUS LANCET 33 gauge Misc USE ONCE DAILY AS DIRECTED 100 each 3    ONETOUCH ULTRA TEST Strp USE TO TEST BLOOD SUGAR ONCE DAILY 50 strip 5    oxybutynin (DITROPAN) 5 MG Tab TAKE 1 TABLET BY MOUTH 3 TIMES A DAY 90 tablet 1    oxybutynin (DITROPAN) 5 MG Tab TAKE 1 TABLET BY MOUTH 3 TIMES A DAY 90 tablet 3    oxybutynin (DITROPAN) 5  MG Tab TAKE ONE TABLET BY MOUTH THREE TIMES A DAY 90 tablet 3    pioglitazone (ACTOS) 30 MG tablet TAKE ONE TABLET BY MOUTH ONCE DAILY 30 tablet 5    traZODone (DESYREL) 100 MG tablet Take 200 mg by mouth nightly as needed.      TRUEPLUS LANCETS 30 gauge Misc USE ONCE DAILY AS DIRECTED 100 each 3    venlafaxine (EFFEXOR-XR) 150 MG Cp24 TAKE ONE CAPSULE BY MOUTH ONCE DAILY 30 capsule 5       Social History     Socioeconomic History    Marital status:    Tobacco Use    Smoking status: Former     Current packs/day: 0.00    Smokeless tobacco: Never   Substance and Sexual Activity    Alcohol use: Never    Drug use: Never    Sexual activity: Yes     Birth control/protection: Surgical     Social Determinants of Health     Financial Resource Strain: Low Risk  (6/14/2023)    Overall Financial Resource Strain (CARDIA)     Difficulty of Paying Living Expenses: Not hard at all   Food Insecurity: No Food Insecurity (6/14/2023)    Hunger Vital Sign     Worried About Running Out of Food in the Last Year: Never true     Ran Out of Food in the Last Year: Never true   Transportation Needs: No Transportation Needs (6/14/2023)    PRAPARE - Transportation     Lack of Transportation (Medical): No     Lack of Transportation (Non-Medical): No   Physical Activity: Insufficiently Active (6/14/2023)    Exercise Vital Sign     Days of Exercise per Week: 5 days     Minutes of Exercise per Session: 20 min   Stress: Stress Concern Present (6/14/2023)    [place] [employer] - Occupational Stress Questionnaire     Feeling of Stress : To some extent   Social Connections: Moderately Integrated (6/14/2023)    Social Connection and Isolation Panel [NHANES]     Frequency of Communication with Friends and Family: More than three times a week     Frequency of Social Gatherings with Friends and Family: More than three times a week     Attends Yarsani Services: More than 4 times per year     Active Member  "of Clubs or Organizations: Yes     Attends Club or Organization Meetings: 1 to 4 times per year     Marital Status:    Housing Stability: Low Risk  (6/14/2023)    Housing Stability Vital Sign     Unable to Pay for Housing in the Last Year: No     Number of Places Lived in the Last Year: 1     Unstable Housing in the Last Year: No        Family History   Problem Relation Age of Onset    Heart attack Mother     Diabetes Mellitus Mother     Coronary artery disease Mother     Hypertension Mother     Lung cancer Father     Diabetes Mellitus Father     Breast cancer Sister     Breast cancer Sister         Patient Care Team:  Fei Smith DO as PCP - General (Family Medicine)  Care, Jorge L Eye as Consulting Physician (Optometry)  Santo Barrientos NP as Consulting Physician (Psychology)  Karlie Cardozo NP as Consulting Physician (Psychology)  Shelley Connor MA     Subjective:     Review of Systems   Constitutional:  Positive for fever. Negative for chills.   Respiratory:  Negative for shortness of breath.    Cardiovascular:  Negative for chest pain.   Gastrointestinal:  Positive for abdominal pain, nausea and vomiting. Negative for constipation and diarrhea.   Genitourinary:  Negative for dysuria, frequency and urgency.   Neurological:  Negative for dizziness and headaches.   Psychiatric/Behavioral:  The patient does not have insomnia.        See HPI for details  All Other ROS: Negative except as stated in HPI.       Objective:     /85   Pulse 86   Temp 97.8 °F (36.6 °C) (Tympanic)   Resp 18   Ht 5' 1.81" (1.57 m)   Wt 108.6 kg (239 lb 6.4 oz)   SpO2 97%   BMI 44.06 kg/m²     Physical Exam  Vitals reviewed.   Constitutional:       General: She is not in acute distress.     Appearance: Normal appearance.   Cardiovascular:      Rate and Rhythm: Normal rate and regular rhythm.      Heart sounds: No murmur heard.     No friction rub. No gallop.   Pulmonary:      Effort: " No respiratory distress.      Breath sounds: No wheezing, rhonchi or rales.   Abdominal:      General: Abdomen is protuberant. Bowel sounds are increased. There is distension.      Palpations: Abdomen is rigid. There is no shifting dullness, fluid wave, hepatomegaly, splenomegaly, mass or pulsatile mass.      Tenderness: There is abdominal tenderness in the right lower quadrant, epigastric area and left lower quadrant.      Hernia: No hernia is present.   Musculoskeletal:         General: No swelling, tenderness or deformity.      Right lower leg: No edema.      Left lower leg: No edema.   Skin:     General: Skin is warm and dry.      Findings: No lesion or rash.   Neurological:      General: No focal deficit present.      Mental Status: She is alert.   Psychiatric:         Mood and Affect: Mood normal.         Assessment/Plan:     1. Generalized abdominal pain  -     Cancel: CT Abdomen Pelvis  Without Contrast; Future; Expected date: 08/09/2023  -     CBC Auto Differential; Future; Expected date: 08/09/2023  -     Comprehensive Metabolic Panel; Future; Expected date: 08/09/2023  -     Urinalysis, Reflex to Urine Culture; Future; Expected date: 08/09/2023  -     CT Abdomen Pelvis  Without Contrast; Future; Expected date: 08/09/2023  -     Amylase; Future; Expected date: 08/09/2023  -     Lipase; Future; Expected date: 08/09/2023          Follow up:     No follow-ups on file. In addition to their scheduled follow up, the patient has also been instructed to follow up on as needed basis.

## 2023-08-10 ENCOUNTER — HOSPITAL ENCOUNTER (OUTPATIENT)
Dept: RADIOLOGY | Facility: HOSPITAL | Age: 53
Discharge: HOME | End: 2023-08-10
Attending: FAMILY MEDICINE
Payer: MEDICAID

## 2023-08-10 DIAGNOSIS — R10.84 GENERALIZED ABDOMINAL PAIN: ICD-10-CM

## 2023-08-10 PROCEDURE — 74176 CT ABD & PELVIS W/O CONTRAST: CPT | Mod: TC

## 2023-08-16 ENCOUNTER — TELEPHONE (OUTPATIENT)
Dept: FAMILY MEDICINE | Facility: CLINIC | Age: 53
End: 2023-08-16
Payer: MEDICAID

## 2023-08-16 NOTE — TELEPHONE ENCOUNTER
----- Message from Elly Barrientos sent at 8/16/2023  9:48 AM CDT -----  Regarding: results  Patient went do blood work and ct, wants to know the results bc she is still in pain

## 2023-08-29 ENCOUNTER — PATIENT MESSAGE (OUTPATIENT)
Dept: ADMINISTRATIVE | Facility: HOSPITAL | Age: 53
End: 2023-08-29
Payer: MEDICAID

## 2023-09-19 ENCOUNTER — PATIENT MESSAGE (OUTPATIENT)
Dept: ADMINISTRATIVE | Facility: HOSPITAL | Age: 53
End: 2023-09-19
Payer: MEDICAID

## 2023-10-30 ENCOUNTER — TELEPHONE (OUTPATIENT)
Dept: FAMILY MEDICINE | Facility: CLINIC | Age: 53
End: 2023-10-30
Payer: MEDICAID

## 2023-10-30 DIAGNOSIS — E11.9 TYPE 2 DIABETES MELLITUS WITHOUT COMPLICATION, WITHOUT LONG-TERM CURRENT USE OF INSULIN: ICD-10-CM

## 2023-10-30 NOTE — TELEPHONE ENCOUNTER
----- Message -----  From: Mattie Barrientos LPN  Sent: 10/30/2023  10:53 AM CDT  To: Elly Barrientos    Needs to check with other pharmacies in the area to see who has it  ----- Message -----  From: Elly Barrientos  Sent: 10/30/2023  10:04 AM CDT  To: [facility] Clinical Support Staff    dulaglutide (TRULICITY) 1.5 mg/0.5 mL pen injector 4 pen     Patient said she has been without this medication for 2 months, pharmacy can not get it is what they told her, she wants to know what to do

## 2023-10-30 NOTE — TELEPHONE ENCOUNTER
I called [facility], they have in stock. Notified pt and she is ok with getting rx there. Rx sent to [facility].

## 2023-12-02 DIAGNOSIS — Z11.3 SCREEN FOR STD (SEXUALLY TRANSMITTED DISEASE): ICD-10-CM

## 2023-12-02 DIAGNOSIS — E11.9 TYPE 2 DIABETES MELLITUS WITHOUT COMPLICATION, WITHOUT LONG-TERM CURRENT USE OF INSULIN: Primary | ICD-10-CM

## 2023-12-04 ENCOUNTER — HOSPITAL ENCOUNTER (EMERGENCY)
Facility: HOSPITAL | Age: 53
Discharge: HOME | End: 2023-12-04
Attending: FAMILY MEDICINE
Payer: MEDICAID

## 2023-12-04 VITALS
RESPIRATION RATE: 18 BRPM | BODY MASS INDEX: 40.48 KG/M2 | HEIGHT: 62 IN | HEART RATE: 85 BPM | TEMPERATURE: 97 F | SYSTOLIC BLOOD PRESSURE: 147 MMHG | OXYGEN SATURATION: 97 % | DIASTOLIC BLOOD PRESSURE: 92 MMHG | WEIGHT: 220 LBS

## 2023-12-04 DIAGNOSIS — I10 PRIMARY HYPERTENSION: ICD-10-CM

## 2023-12-04 DIAGNOSIS — R73.9 HYPERGLYCEMIA: Primary | ICD-10-CM

## 2023-12-04 DIAGNOSIS — I10 HTN (HYPERTENSION): ICD-10-CM

## 2023-12-04 LAB
ALBUMIN SERPL-MCNC: 4 G/DL (ref 3.5–5)
ALBUMIN/GLOB SERPL: 0.8 RATIO (ref 1.1–2)
ALP SERPL-CCNC: 183 UNIT/L (ref 40–150)
ALT SERPL-CCNC: 25 UNIT/L (ref 0–55)
AST SERPL-CCNC: 16 UNIT/L (ref 5–34)
BASOPHILS # BLD AUTO: 0.07 X10(3)/MCL
BASOPHILS NFR BLD AUTO: 0.5 %
BILIRUB SERPL-MCNC: 0.8 MG/DL
BUN SERPL-MCNC: 18 MG/DL (ref 9.8–20.1)
CALCIUM SERPL-MCNC: 10.3 MG/DL (ref 8.4–10.2)
CHLORIDE SERPL-SCNC: 98 MMOL/L (ref 98–107)
CO2 SERPL-SCNC: 22 MMOL/L (ref 22–29)
CREAT SERPL-MCNC: 1.04 MG/DL (ref 0.55–1.02)
EOSINOPHIL # BLD AUTO: 0.19 X10(3)/MCL (ref 0–0.9)
EOSINOPHIL NFR BLD AUTO: 1.4 %
ERYTHROCYTE [DISTWIDTH] IN BLOOD BY AUTOMATED COUNT: 13.8 % (ref 11.5–17)
FLUAV AG UPPER RESP QL IA.RAPID: NOT DETECTED
FLUBV AG UPPER RESP QL IA.RAPID: NOT DETECTED
GFR SERPLBLD CREATININE-BSD FMLA CKD-EPI: >60 MLS/MIN/1.73/M2
GLOBULIN SER-MCNC: 4.8 GM/DL (ref 2.4–3.5)
GLUCOSE SERPL-MCNC: 374 MG/DL (ref 74–100)
HCT VFR BLD AUTO: 50.5 % (ref 37–47)
HGB BLD-MCNC: 16.9 G/DL (ref 12–16)
IMM GRANULOCYTES # BLD AUTO: 0.1 X10(3)/MCL (ref 0–0.04)
IMM GRANULOCYTES NFR BLD AUTO: 0.7 %
LYMPHOCYTES # BLD AUTO: 3.86 X10(3)/MCL (ref 0.6–4.6)
LYMPHOCYTES NFR BLD AUTO: 28.6 %
MCH RBC QN AUTO: 26.9 PG (ref 27–31)
MCHC RBC AUTO-ENTMCNC: 33.5 G/DL (ref 33–36)
MCV RBC AUTO: 80.3 FL (ref 80–94)
MONOCYTES # BLD AUTO: 0.69 X10(3)/MCL (ref 0.1–1.3)
MONOCYTES NFR BLD AUTO: 5.1 %
NEUTROPHILS # BLD AUTO: 8.57 X10(3)/MCL (ref 2.1–9.2)
NEUTROPHILS NFR BLD AUTO: 63.7 %
NRBC BLD AUTO-RTO: 0 %
PLATELET # BLD AUTO: 367 X10(3)/MCL (ref 130–400)
PMV BLD AUTO: 10.1 FL (ref 7.4–10.4)
POCT GLUCOSE: 202 MG/DL (ref 70–110)
POCT GLUCOSE: 353 MG/DL (ref 70–110)
POTASSIUM SERPL-SCNC: 4.2 MMOL/L (ref 3.5–5.1)
PROT SERPL-MCNC: 8.8 GM/DL (ref 6.4–8.3)
RBC # BLD AUTO: 6.29 X10(6)/MCL (ref 4.2–5.4)
RSV A 5' UTR RNA NPH QL NAA+PROBE: NOT DETECTED
SARS-COV-2 RNA RESP QL NAA+PROBE: NOT DETECTED
SODIUM SERPL-SCNC: 139 MMOL/L (ref 136–145)
WBC # SPEC AUTO: 13.48 X10(3)/MCL (ref 4.5–11.5)

## 2023-12-04 PROCEDURE — 96361 HYDRATE IV INFUSION ADD-ON: CPT

## 2023-12-04 PROCEDURE — 25000003 PHARM REV CODE 250: Performed by: FAMILY MEDICINE

## 2023-12-04 PROCEDURE — 96375 TX/PRO/DX INJ NEW DRUG ADDON: CPT

## 2023-12-04 PROCEDURE — 93005 ELECTROCARDIOGRAM TRACING: CPT

## 2023-12-04 PROCEDURE — 63600175 PHARM REV CODE 636 W HCPCS: Performed by: FAMILY MEDICINE

## 2023-12-04 PROCEDURE — 82962 GLUCOSE BLOOD TEST: CPT

## 2023-12-04 PROCEDURE — 0241U COVID/RSV/FLU A&B PCR: CPT | Performed by: FAMILY MEDICINE

## 2023-12-04 PROCEDURE — 96374 THER/PROPH/DIAG INJ IV PUSH: CPT

## 2023-12-04 PROCEDURE — 99284 EMERGENCY DEPT VISIT MOD MDM: CPT | Mod: 25

## 2023-12-04 PROCEDURE — 85025 COMPLETE CBC W/AUTO DIFF WBC: CPT | Performed by: FAMILY MEDICINE

## 2023-12-04 PROCEDURE — 80053 COMPREHEN METABOLIC PANEL: CPT | Performed by: FAMILY MEDICINE

## 2023-12-04 RX ORDER — METOPROLOL TARTRATE 1 MG/ML
5 INJECTION, SOLUTION INTRAVENOUS
Status: COMPLETED | OUTPATIENT
Start: 2023-12-04 | End: 2023-12-04

## 2023-12-04 RX ADMIN — INSULIN HUMAN 5 UNITS: 100 INJECTION, SOLUTION PARENTERAL at 02:12

## 2023-12-04 RX ADMIN — SODIUM CHLORIDE 1000 ML: 9 INJECTION, SOLUTION INTRAVENOUS at 01:12

## 2023-12-04 RX ADMIN — METOPROLOL TARTRATE 5 MG: 1 INJECTION, SOLUTION INTRAVENOUS at 01:12

## 2023-12-04 NOTE — ED PROVIDER NOTES
Encounter Date: 2023       History     Chief Complaint   Patient presents with    Hyperglycemia     Told to come to ed by dr stover for cbg of 241 at home. Also complains of headache, nausea, and weakness starting this morning. Type II DM. Compliant with meds. Cbg 353 in room     This patient is a 53-year-old female whose blood sugar came back 342 this morning.  She called her PCP who told her to come to the emergency room here in the emergency room, patient had a high blood pressure, her blood sugar was high as well and her pulse rate was high too.  Patient states that she took her medicine for high blood pressure she denies any chest pain.  She states she has a headache    The history is provided by the patient.     Review of patient's allergies indicates:  No Known Allergies  Past Medical History:   Diagnosis Date    Asthma     Bipolar disorder, unspecified     Diabetes mellitus type 2 in obese     DUB (dysfunctional uterine bleeding)     Edema leg     Family history of breast cancer in sister     x2    Hepatic steatosis     History of infection due to human papilloma virus (HPV)     HLD (hyperlipidemia)     HTN (hypertension)     Hypercholesterolemia     Hyperlipidemia due to type 2 diabetes mellitus 2022    Hypertriglyceridemia     WALTER (iron deficiency anemia)     Microcytic anemia     Morbid obesity     Schizoaffective disorder     Type 2 diabetes mellitus     Type 2 diabetes mellitus with hyperglycemia     Urge incontinence of urine     Vision blurred      Past Surgical History:   Procedure Laterality Date     SECTION  1994     SECTION  1991     SECTION WITH TUBAL LIGATION  1998    CHOLECYSTECTOMY  1989    HERNIA REPAIR       Family History   Problem Relation Age of Onset    Heart attack Mother     Diabetes Mellitus Mother     Coronary artery disease Mother     Hypertension Mother     Lung cancer Father     Diabetes Mellitus Father     Breast cancer  Sister     Breast cancer Sister      Social History     Tobacco Use    Smoking status: Former    Smokeless tobacco: Never   Substance Use Topics    Alcohol use: Never    Drug use: Never     Review of Systems   Constitutional: Negative.    HENT: Negative.     Respiratory: Negative.     Cardiovascular: Negative.    Endocrine: Negative.    Neurological:  Positive for headaches.   Psychiatric/Behavioral:  The patient is nervous/anxious.    All other systems reviewed and are negative.      Physical Exam     Initial Vitals [12/04/23 1310]   BP Pulse Resp Temp SpO2   (!) 195/121 (!) 127 18 97.3 °F (36.3 °C) 100 %      MAP       --         Physical Exam    Nursing note and vitals reviewed.  Constitutional: She appears well-developed.   HENT:   Head: Normocephalic.   Eyes: Pupils are equal, round, and reactive to light.   Neck:   Normal range of motion.  Cardiovascular:  Regular rhythm and normal heart sounds.   Tachycardia present.         Pulmonary/Chest: Breath sounds normal.   Abdominal: Abdomen is soft.   Musculoskeletal:         General: Normal range of motion.      Cervical back: Normal range of motion.     Neurological: She is alert and oriented to person, place, and time. She has normal strength and normal reflexes. No cranial nerve deficit or sensory deficit. GCS score is 15. GCS eye subscore is 4. GCS verbal subscore is 5. GCS motor subscore is 6.   Skin: Skin is warm and dry.   Psychiatric: She has a normal mood and affect.         ED Course   Procedures  Labs Reviewed   COMPREHENSIVE METABOLIC PANEL - Abnormal; Notable for the following components:       Result Value    Glucose Level 374 (*)     Creatinine 1.04 (*)     Calcium Level Total 10.3 (*)     Protein Total 8.8 (*)     Globulin 4.8 (*)     Albumin/Globulin Ratio 0.8 (*)     Alkaline Phosphatase 183 (*)     All other components within normal limits   CBC WITH DIFFERENTIAL - Abnormal; Notable for the following components:    WBC 13.48 (*)     RBC 6.29 (*)      Hgb 16.9 (*)     Hct 50.5 (*)     MCH 26.9 (*)     IG# 0.10 (*)     All other components within normal limits   POCT GLUCOSE - Abnormal; Notable for the following components:    POCT Glucose 353 (*)     All other components within normal limits   POCT GLUCOSE - Abnormal; Notable for the following components:    POCT Glucose 202 (*)     All other components within normal limits   COVID/RSV/FLU A&B PCR - Normal    Narrative:     The Xpert Xpress SARS-CoV-2/FLU/RSV plus is a rapid, multiplexed real-time PCR test intended for the simultaneous qualitative detection and differentiation of SARS-CoV-2, Influenza A, Influenza B, and respiratory syncytial virus (RSV) viral RNA in either nasopharyngeal swab or nasal swab specimens.         CBC W/ AUTO DIFFERENTIAL    Narrative:     The following orders were created for panel order CBC auto differential.  Procedure                               Abnormality         Status                     ---------                               -----------         ------                     CBC with Differential[[id number]]       Abnormal            Final result                 Please view results for these tests on the individual orders.   POCT GLUCOSE, HAND-HELD DEVICE   POCT GLUCOSE MONITORING CONTINUOUS          Imaging Results    None          Medications   insulin regular injection 5 Units 0.05 mL (5 Units Intravenous Given 12/4/23 1401)   sodium chloride 0.9% bolus 1,000 mL 1,000 mL (0 mLs Intravenous Stopped 12/4/23 1503)   metoprolol injection 5 mg (5 mg Intravenous Given 12/4/23 1353)     Medical Decision Making  This patient is a 53-year-old female who comes in awake alert and oriented complaining of elevated blood sugar.  Patient's blood sugar was a proximally 350 and she called her PCP and the PCP told her to come to the emergency room.  Patient had elevated blood sugar, elevated blood pressure and elevated pulse in the emergency room.  Patient was given Lopressor, insulin,  and 1 L of fluids in the emergency room she was also tested for the flu  Differential diagnosis:  Flu, COVID, SVT, hypertensive urgency, hyperglycemia    Amount and/or Complexity of Data Reviewed  Labs: ordered.     Details: Lab work was drawn on this patient found to have a 374 glucose, creatinine of 1.04, calcium of 10.3, protein of 8.8, globulin of 4.8, alk-phos of 183, WBC count of 13.48, hemoglobin of 16.9, hematocrit of 50.5                                      Clinical Impression:  Final diagnoses:  [I10] HTN (hypertension)  [R73.9] Hyperglycemia (Primary)  [I10] Primary hypertension          ED Disposition Condition    Discharge Stable          ED Prescriptions    None       Follow-up Information       Follow up With Specialties Details Why Contact Info    Fei Smith, DO Family Medicine   [address] [facility] [address]  [phone]               Tushar Apple MD  12/04/23 7487

## 2023-12-04 NOTE — ED NOTES
Patient ambulated to ER room 2 with steady gait.  Stated that she had a blood sugar of 241 this morning at home.  She contacted Dr. Hare who instructed her to come to the ER.  Patient complains of headache rated a 10/10 on pain scale. Stated that she took tylenol this am around 9

## 2023-12-19 ENCOUNTER — LAB VISIT (OUTPATIENT)
Dept: LAB | Facility: HOSPITAL | Age: 53
End: 2023-12-19
Payer: MEDICAID

## 2023-12-19 DIAGNOSIS — Z11.3 SCREEN FOR STD (SEXUALLY TRANSMITTED DISEASE): ICD-10-CM

## 2023-12-19 DIAGNOSIS — E11.9 TYPE 2 DIABETES MELLITUS WITHOUT COMPLICATION, WITHOUT LONG-TERM CURRENT USE OF INSULIN: ICD-10-CM

## 2023-12-19 LAB
ALBUMIN SERPL-MCNC: 3.6 G/DL (ref 3.5–5)
ALBUMIN/GLOB SERPL: 0.9 RATIO (ref 1.1–2)
ALP SERPL-CCNC: 135 UNIT/L (ref 40–150)
ALT SERPL-CCNC: 38 UNIT/L (ref 0–55)
AST SERPL-CCNC: 18 UNIT/L (ref 5–34)
BILIRUB SERPL-MCNC: 0.5 MG/DL
BUN SERPL-MCNC: 11 MG/DL (ref 9.8–20.1)
CALCIUM SERPL-MCNC: 10.8 MG/DL (ref 8.4–10.2)
CHLORIDE SERPL-SCNC: 99 MMOL/L (ref 98–107)
CHOLEST SERPL-MCNC: 225 MG/DL
CHOLEST/HDLC SERPL: 6 {RATIO} (ref 0–5)
CO2 SERPL-SCNC: 31 MMOL/L (ref 22–29)
CREAT SERPL-MCNC: 0.87 MG/DL (ref 0.55–1.02)
EST. AVERAGE GLUCOSE BLD GHB EST-MCNC: 203 MG/DL
GFR SERPLBLD CREATININE-BSD FMLA CKD-EPI: >60 MLS/MIN/1.73/M2
GLOBULIN SER-MCNC: 4.2 GM/DL (ref 2.4–3.5)
GLUCOSE SERPL-MCNC: 202 MG/DL (ref 74–100)
HBA1C MFR BLD: 8.7 %
HDLC SERPL-MCNC: 37 MG/DL (ref 35–60)
POTASSIUM SERPL-SCNC: 4 MMOL/L (ref 3.5–5.1)
PROT SERPL-MCNC: 7.8 GM/DL (ref 6.4–8.3)
SODIUM SERPL-SCNC: 139 MMOL/L (ref 136–145)
TRIGL SERPL-MCNC: 545 MG/DL (ref 37–140)

## 2023-12-19 PROCEDURE — 86803 HEPATITIS C AB TEST: CPT

## 2023-12-19 PROCEDURE — 83036 HEMOGLOBIN GLYCOSYLATED A1C: CPT

## 2023-12-19 PROCEDURE — 36415 COLL VENOUS BLD VENIPUNCTURE: CPT

## 2023-12-19 PROCEDURE — 80061 LIPID PANEL: CPT

## 2023-12-19 PROCEDURE — 80053 COMPREHEN METABOLIC PANEL: CPT

## 2023-12-19 PROCEDURE — 87389 HIV-1 AG W/HIV-1&-2 AB AG IA: CPT

## 2023-12-20 LAB
HCV AB SERPL QL IA: NONREACTIVE
HIV 1+2 AB+HIV1 P24 AG SERPL QL IA: NONREACTIVE

## 2023-12-21 ENCOUNTER — OFFICE VISIT (OUTPATIENT)
Dept: FAMILY MEDICINE | Facility: CLINIC | Age: 53
End: 2023-12-21
Payer: MEDICAID

## 2023-12-21 VITALS
RESPIRATION RATE: 18 BRPM | HEIGHT: 62 IN | OXYGEN SATURATION: 98 % | TEMPERATURE: 98 F | DIASTOLIC BLOOD PRESSURE: 85 MMHG | BODY MASS INDEX: 43.06 KG/M2 | SYSTOLIC BLOOD PRESSURE: 137 MMHG | HEART RATE: 93 BPM | WEIGHT: 234 LBS

## 2023-12-21 DIAGNOSIS — I10 PRIMARY HYPERTENSION: Chronic | ICD-10-CM

## 2023-12-21 DIAGNOSIS — Z12.11 SCREENING FOR MALIGNANT NEOPLASM OF COLON: ICD-10-CM

## 2023-12-21 DIAGNOSIS — K21.9 GASTROESOPHAGEAL REFLUX DISEASE, UNSPECIFIED WHETHER ESOPHAGITIS PRESENT: Chronic | ICD-10-CM

## 2023-12-21 DIAGNOSIS — N39.41 URGE INCONTINENCE OF URINE: Chronic | ICD-10-CM

## 2023-12-21 DIAGNOSIS — E78.2 MIXED HYPERLIPIDEMIA: Chronic | ICD-10-CM

## 2023-12-21 DIAGNOSIS — Z00.00 WELL ADULT EXAM: Primary | ICD-10-CM

## 2023-12-21 DIAGNOSIS — E11.65 TYPE 2 DIABETES MELLITUS WITH HYPERGLYCEMIA, WITHOUT LONG-TERM CURRENT USE OF INSULIN: Chronic | ICD-10-CM

## 2023-12-21 DIAGNOSIS — E66.01 CLASS 3 SEVERE OBESITY DUE TO EXCESS CALORIES WITH SERIOUS COMORBIDITY AND BODY MASS INDEX (BMI) OF 40.0 TO 44.9 IN ADULT: Chronic | ICD-10-CM

## 2023-12-21 DIAGNOSIS — D72.829 LEUKOCYTOSIS, UNSPECIFIED TYPE: ICD-10-CM

## 2023-12-21 DIAGNOSIS — N63.10 MASS OF RIGHT BREAST, UNSPECIFIED QUADRANT: ICD-10-CM

## 2023-12-21 DIAGNOSIS — Z12.31 BREAST CANCER SCREENING BY MAMMOGRAM: ICD-10-CM

## 2023-12-21 PROBLEM — E78.1 HYPERTRIGLYCERIDEMIA: Status: RESOLVED | Noted: 2022-05-09 | Resolved: 2023-12-21

## 2023-12-21 PROBLEM — E66.813 CLASS 3 SEVERE OBESITY DUE TO EXCESS CALORIES WITH SERIOUS COMORBIDITY AND BODY MASS INDEX (BMI) OF 40.0 TO 44.9 IN ADULT: Status: ACTIVE | Noted: 2022-05-09

## 2023-12-21 LAB
BASOPHILS # BLD AUTO: 0.07 X10(3)/MCL
BASOPHILS NFR BLD AUTO: 0.6 %
EOSINOPHIL # BLD AUTO: 0.36 X10(3)/MCL (ref 0–0.9)
EOSINOPHIL NFR BLD AUTO: 3.3 %
ERYTHROCYTE [DISTWIDTH] IN BLOOD BY AUTOMATED COUNT: 14.6 % (ref 11.5–17)
HCT VFR BLD AUTO: 49.5 % (ref 37–47)
HGB BLD-MCNC: 15.2 G/DL (ref 12–16)
IMM GRANULOCYTES # BLD AUTO: 0.04 X10(3)/MCL (ref 0–0.04)
IMM GRANULOCYTES NFR BLD AUTO: 0.4 %
LYMPHOCYTES # BLD AUTO: 3.52 X10(3)/MCL (ref 0.6–4.6)
LYMPHOCYTES NFR BLD AUTO: 32.6 %
MCH RBC QN AUTO: 27 PG (ref 27–31)
MCHC RBC AUTO-ENTMCNC: 30.7 G/DL (ref 33–36)
MCV RBC AUTO: 87.9 FL (ref 80–94)
MONOCYTES # BLD AUTO: 0.53 X10(3)/MCL (ref 0.1–1.3)
MONOCYTES NFR BLD AUTO: 4.9 %
NEUTROPHILS # BLD AUTO: 6.29 X10(3)/MCL (ref 2.1–9.2)
NEUTROPHILS NFR BLD AUTO: 58.2 %
NRBC BLD AUTO-RTO: 0 %
PLATELET # BLD AUTO: 318 X10(3)/MCL (ref 130–400)
PMV BLD AUTO: 10.7 FL (ref 7.4–10.4)
RBC # BLD AUTO: 5.63 X10(6)/MCL (ref 4.2–5.4)
WBC # SPEC AUTO: 10.81 X10(3)/MCL (ref 4.5–11.5)

## 2023-12-21 PROCEDURE — 99396 PREV VISIT EST AGE 40-64: CPT | Mod: ,,,

## 2023-12-21 PROCEDURE — 3052F PR MOST RECENT HEMOGLOBIN A1C LEVEL 8.0 - < 9.0%: ICD-10-PCS | Mod: CPTII,,,

## 2023-12-21 PROCEDURE — 4010F ACE/ARB THERAPY RXD/TAKEN: CPT | Mod: CPTII,,,

## 2023-12-21 PROCEDURE — 3066F NEPHROPATHY DOC TX: CPT | Mod: CPTII,,,

## 2023-12-21 PROCEDURE — 1159F MED LIST DOCD IN RCRD: CPT | Mod: CPTII,,,

## 2023-12-21 PROCEDURE — 1159F PR MEDICATION LIST DOCUMENTED IN MEDICAL RECORD: ICD-10-PCS | Mod: CPTII,,,

## 2023-12-21 PROCEDURE — 3061F NEG MICROALBUMINURIA REV: CPT | Mod: CPTII,,,

## 2023-12-21 PROCEDURE — 1160F RVW MEDS BY RX/DR IN RCRD: CPT | Mod: CPTII,,,

## 2023-12-21 PROCEDURE — 99396 PR PREVENTIVE VISIT,EST,40-64: ICD-10-PCS | Mod: ,,,

## 2023-12-21 PROCEDURE — 3075F SYST BP GE 130 - 139MM HG: CPT | Mod: CPTII,,,

## 2023-12-21 PROCEDURE — 3066F PR DOCUMENTATION OF TREATMENT FOR NEPHROPATHY: ICD-10-PCS | Mod: CPTII,,,

## 2023-12-21 PROCEDURE — 1160F PR REVIEW ALL MEDS BY PRESCRIBER/CLIN PHARMACIST DOCUMENTED: ICD-10-PCS | Mod: CPTII,,,

## 2023-12-21 PROCEDURE — 85025 COMPLETE CBC W/AUTO DIFF WBC: CPT

## 2023-12-21 PROCEDURE — 4010F PR ACE/ARB THEARPY RXD/TAKEN: ICD-10-PCS | Mod: CPTII,,,

## 2023-12-21 PROCEDURE — 3008F PR BODY MASS INDEX (BMI) DOCUMENTED: ICD-10-PCS | Mod: CPTII,,,

## 2023-12-21 PROCEDURE — 3079F PR MOST RECENT DIASTOLIC BLOOD PRESSURE 80-89 MM HG: ICD-10-PCS | Mod: CPTII,,,

## 2023-12-21 PROCEDURE — 3075F PR MOST RECENT SYSTOLIC BLOOD PRESS GE 130-139MM HG: ICD-10-PCS | Mod: CPTII,,,

## 2023-12-21 PROCEDURE — 3052F HG A1C>EQUAL 8.0%<EQUAL 9.0%: CPT | Mod: CPTII,,,

## 2023-12-21 PROCEDURE — 3061F PR NEG MICROALBUMINURIA RESULT DOCUMENTED/REVIEW: ICD-10-PCS | Mod: CPTII,,,

## 2023-12-21 PROCEDURE — 3079F DIAST BP 80-89 MM HG: CPT | Mod: CPTII,,,

## 2023-12-21 PROCEDURE — 3008F BODY MASS INDEX DOCD: CPT | Mod: CPTII,,,

## 2023-12-21 PROCEDURE — 36415 COLL VENOUS BLD VENIPUNCTURE: CPT

## 2023-12-21 RX ORDER — OMEPRAZOLE 20 MG/1
20 CAPSULE, DELAYED RELEASE ORAL DAILY
Qty: 120 CAPSULE | Refills: 0 | Status: SHIPPED | OUTPATIENT
Start: 2023-12-21 | End: 2024-01-10

## 2023-12-21 RX ORDER — DULAGLUTIDE 3 MG/.5ML
3 INJECTION, SOLUTION SUBCUTANEOUS
Qty: 4 PEN | Refills: 11 | Status: SHIPPED | OUTPATIENT
Start: 2023-12-21 | End: 2023-12-21

## 2023-12-21 RX ORDER — LOSARTAN POTASSIUM 100 MG/1
100 TABLET ORAL DAILY
Qty: 30 TABLET | Refills: 3 | Status: SHIPPED | OUTPATIENT
Start: 2023-12-21 | End: 2024-01-10 | Stop reason: SDUPTHER

## 2023-12-21 RX ORDER — DULAGLUTIDE 1.5 MG/.5ML
1.5 INJECTION, SOLUTION SUBCUTANEOUS
Qty: 4 PEN | Refills: 11 | Status: SHIPPED | OUTPATIENT
Start: 2023-12-21 | End: 2024-03-21

## 2023-12-21 RX ORDER — GLIPIZIDE 10 MG/1
10 TABLET ORAL 2 TIMES DAILY
Qty: 60 TABLET | Refills: 5 | Status: SHIPPED | OUTPATIENT
Start: 2023-12-21

## 2023-12-21 RX ORDER — OXYBUTYNIN CHLORIDE 5 MG/1
5 TABLET ORAL 3 TIMES DAILY
Qty: 90 TABLET | Refills: 3 | Status: SHIPPED | OUTPATIENT
Start: 2023-12-21 | End: 2024-01-10 | Stop reason: SDUPTHER

## 2023-12-21 RX ORDER — FENOFIBRATE 54 MG/1
54 TABLET ORAL DAILY
Qty: 90 TABLET | Refills: 3 | Status: SHIPPED | OUTPATIENT
Start: 2023-12-21 | End: 2024-12-20

## 2023-12-21 RX ORDER — ATORVASTATIN CALCIUM 80 MG/1
80 TABLET, FILM COATED ORAL DAILY
Qty: 90 TABLET | Refills: 3 | Status: SHIPPED | OUTPATIENT
Start: 2023-12-21 | End: 2024-01-10 | Stop reason: SDUPTHER

## 2023-12-21 RX ORDER — PIOGLITAZONEHYDROCHLORIDE 30 MG/1
30 TABLET ORAL DAILY
Qty: 30 TABLET | Refills: 5 | Status: SHIPPED | OUTPATIENT
Start: 2023-12-21

## 2023-12-21 NOTE — ASSESSMENT & PLAN NOTE

## 2023-12-21 NOTE — ASSESSMENT & PLAN NOTE
Lab Results   Component Value Date    .00 06/08/2023    TRIG 545 (H) 12/19/2023    HDL 37 12/19/2023    TOTALCHOLEST 6 (H) 12/19/2023     Continue Atorvastatin 80 mg daily.  Start Fenofibrate 54 mg daily.   Stressed importance of dietary modifications. Follow a low cholesterol, low saturated fat diet with less that 200mg of cholesterol a day.  Avoid fried foods and high saturated fats (high saturated fats less than 7% of calories).  Add Flax Seed/Fish Oil supplements to diet. Increase dietary fiber.  Regular exercise can reduce LDL and raise HDL. Stressed importance of physical activity 5 times per week for 30 minutes per day.

## 2023-12-21 NOTE — ASSESSMENT & PLAN NOTE
Continue Oxybutynin 5 mg TID.     Practice Kegel exercises 8-12 times per session.   Limit the consumption of alcohol, spicy or acidic foods, and artificial sweeteners.  Limit fluid intake right before bed.

## 2023-12-21 NOTE — ASSESSMENT & PLAN NOTE
Well controlled.   Continue Losartan 100 mg + HCTZ 12.5 mg daily.   Low Sodium Diet (DASH Diet - Less than 2 grams of sodium per day).  Monitor blood pressure daily and log. Report consistent numbers greater than 140/90.  Maintain healthy weight with goal BMI <30. Exercise 30 minutes per day, 5 days per week.

## 2023-12-21 NOTE — ASSESSMENT & PLAN NOTE
Lab Results   Component Value Date    HGBA1C 8.7 (H) 12/19/2023    HGBA1C 5.7 06/08/2023    .00 06/08/2023    CREATININE 0.87 12/19/2023      Ms. Malik had some trouble with filling her medications.   She has not been taking her Glipizide and Actos.   Refilled all medications today.   Educated patient on ADA diet and gave her two handouts.   Continue Actos 30 mg + Glipizide 10 mg BID + Jardiance 25 mg daily + Trulicity 1.5 mg every 7 days.   Recheck in three months.   Educated patient to please call the office if she has trouble receiving her medications from the pharmacy.   On ARB and Statin according to guidelines.  Discussed caution with SGLT2s + diuretics as concomitant use can cause volume depletion.   Follow ADA Diet. Avoid soda, simple sweets, and limit rice/pasta/breads/starches (no more than 45-50 grams per meal).  Maintain healthy weight with goal BMI <30.  Exercise 5 times per week for 30 minutes per day.  Stressed importance of daily foot exams.  Stressed importance of annual dilated eye exam.

## 2023-12-21 NOTE — PROGRESS NOTES
Patient ID: [id number]     Chief Complaint: Annual Exam      HPI:     Helena Vazquez is a 53 y.o. female here today for an annual wellness visit. Reviewed and discussed lab results. No other complaints today.   Past Medical History:   Diagnosis Date    Asthma     Bipolar disorder, unspecified     Diabetes mellitus type 2 in obese     DUB (dysfunctional uterine bleeding)     Edema leg     Family history of breast cancer in sister     x2    Hepatic steatosis     History of infection due to human papilloma virus (HPV)     HLD (hyperlipidemia)     HTN (hypertension)     Hypercholesterolemia     Hyperlipidemia due to type 2 diabetes mellitus 2022    Hypertriglyceridemia     WALTER (iron deficiency anemia)     Microcytic anemia     Morbid obesity     Schizoaffective disorder     Type 2 diabetes mellitus     Type 2 diabetes mellitus with hyperglycemia     Urge incontinence of urine     Vision blurred         Past Surgical History:   Procedure Laterality Date     SECTION  1994     SECTION  1991     SECTION WITH TUBAL LIGATION  1998    CHOLECYSTECTOMY  1989    HERNIA REPAIR          Social History     Socioeconomic History    Marital status:    Tobacco Use    Smoking status: Former    Smokeless tobacco: Never   Substance and Sexual Activity    Alcohol use: Never    Drug use: Never    Sexual activity: Yes     Birth control/protection: Surgical     Social Determinants of Health     Financial Resource Strain: Low Risk  (2023)    Overall Financial Resource Strain (CARDIA)     Difficulty of Paying Living Expenses: Not hard at all   Food Insecurity: No Food Insecurity (2023)    Hunger Vital Sign     Worried About Running Out of Food in the Last Year: Never true     Ran Out of Food in the Last Year: Never true   Transportation Needs: No Transportation Needs (2023)    PRAPARE - Transportation     Lack of Transportation  (Medical): No     Lack of Transportation (Non-Medical): No   Physical Activity: Insufficiently Active (6/14/2023)    Exercise Vital Sign     Days of Exercise per Week: 5 days     Minutes of Exercise per Session: 20 min   Stress: Stress Concern Present (6/14/2023)    [employer] - Occupational Stress Questionnaire     Feeling of Stress : To some extent   Social Connections: Moderately Integrated (6/14/2023)    Social Connection and Isolation Panel [NHANES]     Frequency of Communication with Friends and Family: More than three times a week     Frequency of Social Gatherings with Friends and Family: More than three times a week     Attends Congregation Services: More than 4 times per year     Active Member of Clubs or Organizations: Yes     Attends Club or Organization Meetings: 1 to 4 times per year     Marital Status:    Housing Stability: Low Risk  (6/14/2023)    Housing Stability Vital Sign     Unable to Pay for Housing in the Last Year: No     Number of Places Lived in the Last Year: 1     Unstable Housing in the Last Year: No        Current Outpatient Medications   Medication Instructions    albuterol-ipratropium (DUO-NEB) 2.5 mg-0.5 mg/3 mL nebulizer solution   See Instructions, INHALE 1 VIAL VIA NEBULIZER FOUR TIMES A DAY, # 90 mL, 5 Refill(s), Pharmacy: [facility], 155, cm, Height/Length Dosing, 12/21/21 7:40:00 CST, 119, kg, Weight Dosing, 12/21/21 7:40:00 CST    alcohol antiseptic pads (ALCOHOL SWABS TOP)   alcohol swabs, See Instructions, use alcohol swab on injection site prior to injection, # 1 box(es), 3 Refill(s), Pharmacy: [facility], 155, cm, Height/Length Dosing, 02/09/22 9:59:00 CST, 116.4, kg, Weight Dosing, 02/09/22 9:59...    atorvastatin (LIPITOR) 80 mg, Oral, Daily    fenofibrate (TRICOR) 54 mg, Oral, Daily    glipiZIDE (GLUCOTROL) 10 mg, Oral, 2 times daily    hydroCHLOROthiazide (MICROZIDE)  "12.5 mg capsule TAKE ONE CAPSULE BY MOUTH ONCE DAILY    JARDIANCE 25 mg tablet TAKE ONE TABLET BY MOUTH EVERY MORNING    lancets Misc   TRUEPLUS LANCETS 30G  MISC, See Instructions, USE ONCE DAILY AS DIRECTED, # 100 EA, 3 Refill(s), Pharmacy: [facility], 155, cm, Height/Length Dosing, 07/01/21 9:54:00 CDT, 121.2, kg, Weight Dosing, 07/01/21 9:54:00 CDT    losartan (COZAAR) 100 mg, Oral, Daily    omeprazole (PRILOSEC) 20 mg, Oral, Daily    ONETOUCH DELICA PLUS LANCET 33 gauge Misc USE ONCE DAILY AS DIRECTED    ONETOUCH ULTRA TEST Strp USE TO TEST BLOOD SUGAR ONCE DAILY    oxybutynin (DITROPAN) 5 mg, Oral, 3 times daily    pioglitazone (ACTOS) 30 mg, Oral, Daily    traZODone (DESYREL) 200 mg, Oral, Nightly PRN    TRUEPLUS LANCETS 30 gauge Misc USE ONCE DAILY AS DIRECTED    TRULICITY 1.5 mg, Subcutaneous, Every 7 days       Review of patient's allergies indicates:  No Known Allergies     Patient Care Team:  Fei Smith DO as PCP - General (Family Medicine)  Care, Jorge L Eye as Consulting Physician (Optometry)  Santo Barrientos NP as Consulting Physician (Psychology)  Karlie Cardozo NP as Consulting Physician (Psychology)  Shelley Connor MA     Subjective:     Review of Systems    12 point review of systems conducted, negative except as stated in the history of present illness. See HPI for details.    Objective:     Visit Vitals  /85   Pulse 93   Temp 98.2 °F (36.8 °C) (Temporal)   Resp 18   Ht 5' 1.81" (1.57 m)   Wt 106.1 kg (234 lb)   SpO2 98%   BMI 43.06 kg/m²       Physical Exam  Vitals and nursing note reviewed.   Constitutional:       General: She is not in acute distress.     Appearance: She is not ill-appearing.   HENT:      Head: Normocephalic and atraumatic.      Mouth/Throat:      Mouth: Mucous membranes are moist.      Pharynx: Oropharynx is clear.   Eyes:      General: No scleral icterus.     Extraocular Movements: Extraocular movements intact. "      Conjunctiva/sclera: Conjunctivae normal.      Pupils: Pupils are equal, round, and reactive to light.   Neck:      Vascular: No carotid bruit.   Cardiovascular:      Rate and Rhythm: Normal rate and regular rhythm.      Pulses:           Dorsalis pedis pulses are 2+ on the right side and 2+ on the left side.        Posterior tibial pulses are 2+ on the right side and 2+ on the left side.      Heart sounds: No murmur heard.     No friction rub. No gallop.   Pulmonary:      Effort: Pulmonary effort is normal. No respiratory distress.      Breath sounds: Normal breath sounds. No wheezing, rhonchi or rales.   Abdominal:      General: Abdomen is flat. Bowel sounds are normal. There is no distension.      Palpations: Abdomen is soft. There is no mass.      Tenderness: There is no abdominal tenderness.   Musculoskeletal:         General: Normal range of motion.      Cervical back: Normal range of motion and neck supple.      Right foot: No deformity.      Left foot: No deformity.   Feet:      Right foot:      Protective Sensation: 5 sites tested.  5 sites sensed.      Skin integrity: Skin integrity normal. No ulcer, blister, skin breakdown, erythema, warmth, callus, dry skin or fissure.      Toenail Condition: Right toenails are normal.      Left foot:      Protective Sensation: 5 sites tested.  5 sites sensed.      Skin integrity: Skin integrity normal. No ulcer, blister, skin breakdown, erythema, warmth, callus, dry skin or fissure.      Toenail Condition: Left toenails are normal.   Skin:     General: Skin is warm and dry.   Neurological:      General: No focal deficit present.      Mental Status: She is alert.   Psychiatric:         Mood and Affect: Mood normal.         Labs Reviewed:     Chemistry:  Lab Results   Component Value Date     12/19/2023    K 4.0 12/19/2023    CHLORIDE 99 12/19/2023    BUN 11.0 12/19/2023    CREATININE 0.87 12/19/2023    EGFRNORACEVR >60 12/19/2023    GLUCOSE 202 (H) 12/19/2023     CALCIUM 10.8 (H) 12/19/2023    ALKPHOS 135 12/19/2023    LABPROT 7.8 12/19/2023    ALBUMIN 3.6 12/19/2023    BILIDIR 0.2 02/08/2022    IBILI 0.30 02/08/2022    AST 18 12/19/2023    ALT 38 12/19/2023    MG 2.00 04/12/2021    TSH 2.7489 02/08/2022        Lab Results   Component Value Date    HGBA1C 8.7 (H) 12/19/2023        Hematology:  Lab Results   Component Value Date    WBC 10.81 12/21/2023    HGB 15.2 12/21/2023    HCT 49.5 (H) 12/21/2023     12/21/2023       Lipid Panel:  Lab Results   Component Value Date    CHOL 225 (H) 12/19/2023    HDL 37 12/19/2023    .00 06/08/2023    TRIG 545 (H) 12/19/2023    TOTALCHOLEST 6 (H) 12/19/2023        Urine:  Lab Results   Component Value Date    COLORUA Yellow 08/09/2023    APPEARANCEUA Clear 08/09/2023    SGUA 1.015 08/09/2023    PHUA 6.0 08/09/2023    PROTEINUA Negative 08/09/2023    GLUCOSEUA 3+ (A) 08/09/2023    KETONESUA Negative 08/09/2023    BLOODUA Negative 08/09/2023    NITRITESUA Negative 08/09/2023    LEUKOCYTESUR Negative 08/09/2023    RBCUA 0-1 02/08/2022    WBCUA 0-1 02/08/2022    BACTERIA None Seen 02/08/2022    CREATRANDUR 104.3 06/09/2023        Assessment:       ICD-10-CM ICD-9-CM   1. Well adult exam  Z00.00 V70.0   2. Primary hypertension  I10 401.9   3. Mixed hyperlipidemia  E78.2 272.2   4. Type 2 diabetes mellitus with hyperglycemia, without long-term current use of insulin  E11.65 250.00     790.29   5. Class 3 severe obesity due to excess calories with serious comorbidity and body mass index (BMI) of 40.0 to 44.9 in adult  E66.01 278.01    Z68.41 V85.41   6. Gastroesophageal reflux disease, unspecified whether esophagitis present  K21.9 530.81   7. Urge incontinence of urine  N39.41 788.31   8. Leukocytosis, unspecified type  D72.829 288.60   9. Breast cancer screening by mammogram  Z12.31 V76.12   10. Screening for malignant neoplasm of colon  Z12.11 V76.51   11. Mass of right breast, unspecified quadrant  N63.10 611.72        Plan:      Cervical Cancer Screening -  Last Pap on 11/30/21. Repeat in 2026.     Breast Cancer Screening - Last Mammogram on 7/29/2020. Mammogram was ordered on 5/18/23. Will send orders to [facility] to complete.     Colon Cancer Screening - Colonoscopy referral sent today.     Eye Exam - Recommend annually.    Dental Exam - Recommend biannual exams.     Vaccinations -   Immunization History   Administered Date(s) Administered    Influenza - Quadrivalent 09/15/2020, 10/07/2021    Influenza - Trivalent - PF (ADULT) 11/04/2019    Tdap 11/18/2019          1. Well adult exam    2. Primary hypertension  Assessment & Plan:  Well controlled.   Continue Losartan 100 mg + HCTZ 12.5 mg daily.   Low Sodium Diet (DASH Diet - Less than 2 grams of sodium per day).  Monitor blood pressure daily and log. Report consistent numbers greater than 140/90.  Maintain healthy weight with goal BMI <30. Exercise 30 minutes per day, 5 days per week.      3. Mixed hyperlipidemia  Assessment & Plan:  Lab Results   Component Value Date    .00 06/08/2023    TRIG 545 (H) 12/19/2023    HDL 37 12/19/2023    TOTALCHOLEST 6 (H) 12/19/2023     Continue Atorvastatin 80 mg daily.  Start Fenofibrate 54 mg daily.   Stressed importance of dietary modifications. Follow a low cholesterol, low saturated fat diet with less that 200mg of cholesterol a day.  Avoid fried foods and high saturated fats (high saturated fats less than 7% of calories).  Add Flax Seed/Fish Oil supplements to diet. Increase dietary fiber.  Regular exercise can reduce LDL and raise HDL. Stressed importance of physical activity 5 times per week for 30 minutes per day.    Orders:  -     fenofibrate (TRICOR) 54 MG tablet; Take 1 tablet (54 mg total) by mouth once daily.  Dispense: 90 tablet; Refill: 3  -     losartan (COZAAR) 100 MG tablet; Take 1 tablet (100 mg total) by mouth once daily.  Dispense: 30 tablet; Refill: 3  -     atorvastatin (LIPITOR) 80 MG tablet; Take 1 tablet  (80 mg total) by mouth once daily.  Dispense: 90 tablet; Refill: 3  -     Comprehensive Metabolic Panel; Future; Expected date: 03/18/2024  -     Lipid Panel; Future; Expected date: 03/18/2024    4. Type 2 diabetes mellitus with hyperglycemia, without long-term current use of insulin  Assessment & Plan:  Lab Results   Component Value Date    HGBA1C 8.7 (H) 12/19/2023    HGBA1C 5.7 06/08/2023    .00 06/08/2023    CREATININE 0.87 12/19/2023      Ms. Malik had some trouble with filling her medications.   She has not been taking her Glipizide and Actos.   Refilled all medications today.   Educated patient on ADA diet and gave her two handouts.   Continue Actos 30 mg + Glipizide 10 mg BID + Jardiance 25 mg daily + Trulicity 1.5 mg every 7 days.   Recheck in three months.   Educated patient to please call the office if she has trouble receiving her medications from the pharmacy.   On ARB and Statin according to guidelines.  Discussed caution with SGLT2s + diuretics as concomitant use can cause volume depletion.   Follow ADA Diet. Avoid soda, simple sweets, and limit rice/pasta/breads/starches (no more than 45-50 grams per meal).  Maintain healthy weight with goal BMI <30.  Exercise 5 times per week for 30 minutes per day.  Stressed importance of daily foot exams.  Stressed importance of annual dilated eye exam.          Orders:  -     Discontinue: dulaglutide (TRULICITY) 3 mg/0.5 mL pen injector; Inject 3 mg into the skin every 7 days.  Dispense: 4 pen ; Refill: 11  -     Ambulatory referral/consult to Diabetes Education; Future; Expected date: 12/21/2023  -     pioglitazone (ACTOS) 30 MG tablet; Take 1 tablet (30 mg total) by mouth once daily.  Dispense: 30 tablet; Refill: 5  -     glipiZIDE (GLUCOTROL) 10 MG tablet; Take 1 tablet (10 mg total) by mouth 2 (two) times daily.  Dispense: 60 tablet; Refill: 5  -     dulaglutide (TRULICITY) 1.5 mg/0.5 mL pen injector; Inject 1.5 mg into the skin every 7 days.  Dispense:  4 pen ; Refill: 11  -     Hemoglobin A1C; Future; Expected date: 03/18/2024  -     Comprehensive Metabolic Panel; Future; Expected date: 03/18/2024  -     Lipid Panel; Future; Expected date: 03/18/2024  -     Microalbumin/Creatinine Ratio, Urine; Future; Expected date: 03/18/2024  -     Foot Exam Performed    5. Class 3 severe obesity due to excess calories with serious comorbidity and body mass index (BMI) of 40.0 to 44.9 in adult  Assessment & Plan:  Body mass index is 43.06 kg/m².  Goal BMI <30.  Exercise 5 times a week for 30 minutes per day.  Avoid soda, simple sugars, excessive rice, potatoes or bread. Limit fast foods and fried foods.  Choose complex carbs in moderation (example: green vegetables, beans, oatmeal). Eat plenty of fresh fruits and vegetables with lean meats daily.  Do not skip meals. Eat a balanced portion size.  Avoid fad diets. Consider permanent healthy life style changes.         6. Gastroesophageal reflux disease, unspecified whether esophagitis present  Assessment & Plan:  Continue Omeprazole 20 mg daily.   Avoid spicy, acidic, fried foods and alcohol.  Eat 2-3 hours before going to bed.  Avoid tight clothing, chew food thoroughly.  Reduce caffeine intake, avoid soda.      Orders:  -     omeprazole (PRILOSEC) 20 MG capsule; Take 1 capsule (20 mg total) by mouth once daily.  Dispense: 120 capsule; Refill: 0    7. Urge incontinence of urine  Assessment & Plan:  Continue Oxybutynin 5 mg TID.     Practice Kegel exercises 8-12 times per session.   Limit the consumption of alcohol, spicy or acidic foods, and artificial sweeteners.  Limit fluid intake right before bed.      Orders:  -     oxybutynin (DITROPAN) 5 MG Tab; Take 1 tablet (5 mg total) by mouth 3 (three) times daily.  Dispense: 90 tablet; Refill: 3    8. Leukocytosis, unspecified type  -     CBC Auto Differential; Future; Expected date: 12/21/2023    9. Breast cancer screening by mammogram  -     Mammo Digital Diagnostic Bilat with  Ismael; Future; Expected date: 12/21/2023  -     US Breast Right Limited; Future; Expected date: 12/21/2023    10. Screening for malignant neoplasm of colon  -     Ambulatory referral/consult to Gastroenterology; Future; Expected date: 12/21/2023    11. Mass of right breast, unspecified quadrant  -     Mammo Digital Diagnostic Bilat with Ismael; Future; Expected date: 12/21/2023  -     US Breast Right Limited; Future; Expected date: 12/21/2023       Follow up in about 3 months (around 3/21/2024) for DM II . In addition to their scheduled follow up, the patient has also been instructed to follow up on as needed basis.      Smiley Natarajan NP

## 2023-12-21 NOTE — ASSESSMENT & PLAN NOTE
Continue Omeprazole 20 mg daily.   Avoid spicy, acidic, fried foods and alcohol.  Eat 2-3 hours before going to bed.  Avoid tight clothing, chew food thoroughly.  Reduce caffeine intake, avoid soda.

## 2023-12-22 ENCOUNTER — TELEPHONE (OUTPATIENT)
Dept: DIABETES | Facility: CLINIC | Age: 53
End: 2023-12-22
Payer: MEDICAID

## 2023-12-26 DIAGNOSIS — I10 PRIMARY HYPERTENSION: Primary | ICD-10-CM

## 2023-12-27 ENCOUNTER — DOCUMENTATION ONLY (OUTPATIENT)
Dept: FAMILY MEDICINE | Facility: CLINIC | Age: 53
End: 2023-12-27
Payer: MEDICAID

## 2023-12-27 LAB
LEFT EYE DM RETINOPATHY: NEGATIVE
RIGHT EYE DM RETINOPATHY: NEGATIVE

## 2024-01-09 LAB — BCS RECOMMENDATION EXT: NORMAL

## 2024-01-10 DIAGNOSIS — K21.9 GASTROESOPHAGEAL REFLUX DISEASE, UNSPECIFIED WHETHER ESOPHAGITIS PRESENT: Chronic | ICD-10-CM

## 2024-01-10 DIAGNOSIS — E78.2 MIXED HYPERLIPIDEMIA: Chronic | ICD-10-CM

## 2024-01-10 DIAGNOSIS — N39.41 URGE INCONTINENCE OF URINE: Chronic | ICD-10-CM

## 2024-01-10 RX ORDER — OXYBUTYNIN CHLORIDE 5 MG/1
5 TABLET ORAL 3 TIMES DAILY
Qty: 90 TABLET | Refills: 3 | Status: SHIPPED | OUTPATIENT
Start: 2024-01-10

## 2024-01-10 RX ORDER — LOSARTAN POTASSIUM 100 MG/1
100 TABLET ORAL DAILY
Qty: 90 TABLET | Refills: 3 | Status: SHIPPED | OUTPATIENT
Start: 2024-01-10

## 2024-01-10 RX ORDER — ATORVASTATIN CALCIUM 80 MG/1
80 TABLET, FILM COATED ORAL DAILY
Qty: 90 TABLET | Refills: 3 | Status: SHIPPED | OUTPATIENT
Start: 2024-01-10

## 2024-01-10 RX ORDER — OMEPRAZOLE 20 MG/1
20 CAPSULE, DELAYED RELEASE ORAL
Qty: 90 CAPSULE | Refills: 1 | Status: SHIPPED | OUTPATIENT
Start: 2024-01-10 | End: 2024-01-10 | Stop reason: SDUPTHER

## 2024-01-10 RX ORDER — OMEPRAZOLE 20 MG/1
20 CAPSULE, DELAYED RELEASE ORAL DAILY
Qty: 90 CAPSULE | Refills: 3 | Status: SHIPPED | OUTPATIENT
Start: 2024-01-10

## 2024-01-11 ENCOUNTER — DOCUMENTATION ONLY (OUTPATIENT)
Dept: FAMILY MEDICINE | Facility: CLINIC | Age: 54
End: 2024-01-11
Payer: MEDICAID

## 2024-01-23 ENCOUNTER — TELEPHONE (OUTPATIENT)
Dept: FAMILY MEDICINE | Facility: CLINIC | Age: 54
End: 2024-01-23
Payer: MEDICAID

## 2024-01-23 DIAGNOSIS — E11.65 TYPE 2 DIABETES MELLITUS WITH HYPERGLYCEMIA, WITHOUT LONG-TERM CURRENT USE OF INSULIN: Primary | ICD-10-CM

## 2024-01-23 RX ORDER — SEMAGLUTIDE 0.68 MG/ML
0.25 INJECTION, SOLUTION SUBCUTANEOUS
Qty: 1 EACH | Refills: 0 | Status: SHIPPED | OUTPATIENT
Start: 2024-01-23 | End: 2024-02-26

## 2024-01-23 NOTE — TELEPHONE ENCOUNTER
----- Message from Elly Barrientos sent at 1/23/2024  8:59 AM CST -----  Regarding: having trouble finding medication  Patient can't find where they have this in stock, wondering what to do?    dulaglutide (TRULICITY) 1.5 mg/0.5 mL pen injector 4 pen 11 12/21/2023 12/20/2024

## 2024-02-26 DIAGNOSIS — E11.65 TYPE 2 DIABETES MELLITUS WITH HYPERGLYCEMIA, WITHOUT LONG-TERM CURRENT USE OF INSULIN: ICD-10-CM

## 2024-02-26 RX ORDER — SEMAGLUTIDE 0.68 MG/ML
INJECTION, SOLUTION SUBCUTANEOUS
Qty: 3 ML | Refills: 0 | Status: SHIPPED | OUTPATIENT
Start: 2024-02-26 | End: 2024-03-21

## 2024-03-04 ENCOUNTER — TELEPHONE (OUTPATIENT)
Dept: FAMILY MEDICINE | Facility: CLINIC | Age: 54
End: 2024-03-04
Payer: COMMERCIAL

## 2024-03-04 NOTE — TELEPHONE ENCOUNTER
Ailyn the pharmacist called asking if we wanted to increase the patient dose from 0.25 to 0.5 after speaking with Smiley she wants to keep her on Ozempic 0.25 until she comes in for her appt on 3/21/24. Pharmacist was notifies

## 2024-03-04 NOTE — TELEPHONE ENCOUNTER
----- Message from Ava Lara sent at 3/4/2024 10:05 AM CST -----  Regarding: call back  Please give a call to Ailyn at [facility] about Ms Malik's Julisa [phone]      thanks

## 2024-03-19 ENCOUNTER — LAB VISIT (OUTPATIENT)
Dept: LAB | Facility: HOSPITAL | Age: 54
End: 2024-03-19
Payer: COMMERCIAL

## 2024-03-19 DIAGNOSIS — E78.2 MIXED HYPERLIPIDEMIA: Chronic | ICD-10-CM

## 2024-03-19 DIAGNOSIS — E11.65 TYPE 2 DIABETES MELLITUS WITH HYPERGLYCEMIA, WITHOUT LONG-TERM CURRENT USE OF INSULIN: Chronic | ICD-10-CM

## 2024-03-19 LAB
ALBUMIN SERPL-MCNC: 3.6 G/DL (ref 3.5–5)
ALBUMIN/GLOB SERPL: 0.9 RATIO (ref 1.1–2)
ALP SERPL-CCNC: 136 UNIT/L (ref 40–150)
ALT SERPL-CCNC: 17 UNIT/L (ref 0–55)
AST SERPL-CCNC: 10 UNIT/L (ref 5–34)
BILIRUB SERPL-MCNC: 0.6 MG/DL
BUN SERPL-MCNC: 14 MG/DL (ref 9.8–20.1)
CALCIUM SERPL-MCNC: 9.6 MG/DL (ref 8.4–10.2)
CHLORIDE SERPL-SCNC: 105 MMOL/L (ref 98–107)
CHOLEST SERPL-MCNC: 161 MG/DL
CHOLEST/HDLC SERPL: 4 {RATIO} (ref 0–5)
CO2 SERPL-SCNC: 27 MMOL/L (ref 22–29)
CREAT SERPL-MCNC: 0.81 MG/DL (ref 0.55–1.02)
CREAT UR-MCNC: 69.3 MG/DL (ref 45–106)
EST. AVERAGE GLUCOSE BLD GHB EST-MCNC: 194.4 MG/DL
GFR SERPLBLD CREATININE-BSD FMLA CKD-EPI: >60 MLS/MIN/1.73/M2
GLOBULIN SER-MCNC: 3.9 GM/DL (ref 2.4–3.5)
GLUCOSE SERPL-MCNC: 222 MG/DL (ref 74–100)
HBA1C MFR BLD: 8.4 %
HDLC SERPL-MCNC: 38 MG/DL (ref 35–60)
LDLC SERPL CALC-MCNC: 92 MG/DL (ref 50–140)
MICROALBUMIN UR-MCNC: <5 UG/ML
MICROALBUMIN/CREAT RATIO PNL UR: NORMAL
POTASSIUM SERPL-SCNC: 4.7 MMOL/L (ref 3.5–5.1)
PROT SERPL-MCNC: 7.5 GM/DL (ref 6.4–8.3)
SODIUM SERPL-SCNC: 142 MMOL/L (ref 136–145)
TRIGL SERPL-MCNC: 155 MG/DL (ref 37–140)
VLDLC SERPL CALC-MCNC: 31 MG/DL

## 2024-03-19 PROCEDURE — 80061 LIPID PANEL: CPT

## 2024-03-19 PROCEDURE — 83036 HEMOGLOBIN GLYCOSYLATED A1C: CPT

## 2024-03-19 PROCEDURE — 80053 COMPREHEN METABOLIC PANEL: CPT

## 2024-03-19 PROCEDURE — 36415 COLL VENOUS BLD VENIPUNCTURE: CPT

## 2024-03-19 PROCEDURE — 82043 UR ALBUMIN QUANTITATIVE: CPT

## 2024-03-21 ENCOUNTER — OFFICE VISIT (OUTPATIENT)
Dept: FAMILY MEDICINE | Facility: CLINIC | Age: 54
End: 2024-03-21
Payer: MEDICAID

## 2024-03-21 VITALS
BODY MASS INDEX: 42.88 KG/M2 | HEART RATE: 84 BPM | HEIGHT: 62 IN | DIASTOLIC BLOOD PRESSURE: 93 MMHG | TEMPERATURE: 98 F | SYSTOLIC BLOOD PRESSURE: 157 MMHG | WEIGHT: 233 LBS

## 2024-03-21 DIAGNOSIS — E78.2 MIXED HYPERLIPIDEMIA: Chronic | ICD-10-CM

## 2024-03-21 DIAGNOSIS — I10 PRIMARY HYPERTENSION: ICD-10-CM

## 2024-03-21 DIAGNOSIS — E11.65 TYPE 2 DIABETES MELLITUS WITH HYPERGLYCEMIA, WITHOUT LONG-TERM CURRENT USE OF INSULIN: Primary | Chronic | ICD-10-CM

## 2024-03-21 PROCEDURE — 99214 OFFICE O/P EST MOD 30 MIN: CPT | Mod: ,,,

## 2024-03-21 PROCEDURE — 4010F ACE/ARB THERAPY RXD/TAKEN: CPT | Mod: CPTII,,,

## 2024-03-21 PROCEDURE — 3077F SYST BP >= 140 MM HG: CPT | Mod: CPTII,,,

## 2024-03-21 PROCEDURE — 3066F NEPHROPATHY DOC TX: CPT | Mod: CPTII,,,

## 2024-03-21 PROCEDURE — 3061F NEG MICROALBUMINURIA REV: CPT | Mod: CPTII,,,

## 2024-03-21 PROCEDURE — 3080F DIAST BP >= 90 MM HG: CPT | Mod: CPTII,,,

## 2024-03-21 PROCEDURE — 3052F HG A1C>EQUAL 8.0%<EQUAL 9.0%: CPT | Mod: CPTII,,,

## 2024-03-21 PROCEDURE — 3008F BODY MASS INDEX DOCD: CPT | Mod: CPTII,,,

## 2024-03-21 PROCEDURE — 1159F MED LIST DOCD IN RCRD: CPT | Mod: CPTII,,,

## 2024-03-21 RX ORDER — SEMAGLUTIDE 0.68 MG/ML
0.5 INJECTION, SOLUTION SUBCUTANEOUS
Qty: 3 ML | Refills: 2 | Status: SHIPPED | OUTPATIENT
Start: 2024-03-21 | End: 2024-06-19

## 2024-03-21 RX ORDER — HYDROCHLOROTHIAZIDE 25 MG/1
25 TABLET ORAL DAILY
Qty: 30 TABLET | Refills: 2 | Status: SHIPPED | OUTPATIENT
Start: 2024-03-21 | End: 2024-06-19

## 2024-03-21 NOTE — PROGRESS NOTES
Patient ID: [id number]     Chief Complaint: Diabetes (3 month check up)      HPI:     Helena Vazquez is a 54 y.o. female here today for a DM II follow up. The patient reports that she checks her blood sugars daily. She does not want to participate in Diabetes Education. Denies any hypoglycemia episodes. No other complaints today.     Past Medical History:   Diagnosis Date    Asthma     Bipolar disorder, unspecified     Diabetes mellitus type 2 in obese     DUB (dysfunctional uterine bleeding)     Edema leg     Family history of breast cancer in sister     x2    Hepatic steatosis     History of infection due to human papilloma virus (HPV)     HLD (hyperlipidemia)     HTN (hypertension)     Hypercholesterolemia     Hyperlipidemia due to type 2 diabetes mellitus 2022    Hypertriglyceridemia     WALTER (iron deficiency anemia)     Microcytic anemia     Morbid obesity     Schizoaffective disorder     Type 2 diabetes mellitus     Type 2 diabetes mellitus with hyperglycemia     Urge incontinence of urine     Vision blurred         Past Surgical History:   Procedure Laterality Date     SECTION  1994     SECTION  1991     SECTION WITH TUBAL LIGATION  1998    CHOLECYSTECTOMY  1989    HERNIA REPAIR          Social History     Socioeconomic History    Marital status:    Tobacco Use    Smoking status: Former    Smokeless tobacco: Never   Substance and Sexual Activity    Alcohol use: Never    Drug use: Never    Sexual activity: Yes     Birth control/protection: Surgical     Social Determinants of Health     Financial Resource Strain: Low Risk  (2023)    Overall Financial Resource Strain (CARDIA)     Difficulty of Paying Living Expenses: Not hard at all   Food Insecurity: No Food Insecurity (2023)    Hunger Vital Sign     Worried About Running Out of Food in the Last Year: Never true     Ran Out of Food in the Last Year: Never true   Transportation Needs: No  Transportation Needs (6/14/2023)    PRAPARE - Transportation     Lack of Transportation (Medical): No     Lack of Transportation (Non-Medical): No   Physical Activity: Insufficiently Active (6/14/2023)    Exercise Vital Sign     Days of Exercise per Week: 5 days     Minutes of Exercise per Session: 20 min   Stress: Stress Concern Present (6/14/2023)    [employer] [place] [employer] - Occupational Stress Questionnaire     Feeling of Stress : To some extent   Social Connections: Moderately Integrated (6/14/2023)    Social Connection and Isolation Panel [NHANES]     Frequency of Communication with Friends and Family: More than three times a week     Frequency of Social Gatherings with Friends and Family: More than three times a week     Attends [employer] Services: More than 4 times per year     Active Member of Clubs or Organizations: Yes     Attends Club or Organization Meetings: 1 to 4 times per year     Marital Status:    Housing Stability: Low Risk  (6/14/2023)    Housing Stability Vital Sign     Unable to Pay for Housing in the Last Year: No     Number of Places Lived in the Last Year: 1     Unstable Housing in the Last Year: No        Current Outpatient Medications   Medication Instructions    albuterol-ipratropium (DUO-NEB) 2.5 mg-0.5 mg/3 mL nebulizer solution   See Instructions, INHALE 1 VIAL VIA NEBULIZER FOUR TIMES A DAY, # 90 mL, 5 Refill(s), Pharmacy: [facility], 155, cm, Height/Length Dosing, 12/21/21 7:40:00 CST, 119, kg, Weight Dosing, 12/21/21 7:40:00 CST    alcohol antiseptic pads (ALCOHOL SWABS TOP)   alcohol swabs, See Instructions, use alcohol swab on injection site prior to injection, # 1 box(es), 3 Refill(s), Pharmacy: [facility], 155, cm, Height/Length Dosing, 02/09/22 9:59:00 CST, 116.4, kg, Weight Dosing, 02/09/22 9:59...    atorvastatin (LIPITOR) 80 mg, Oral, Daily    fenofibrate (TRICOR) 54 mg, Oral, Daily    glipiZIDE  "(GLUCOTROL) 10 mg, Oral, 2 times daily    hydroCHLOROthiazide (HYDRODIURIL) 25 mg, Oral, Daily    JARDIANCE 25 mg tablet TAKE ONE TABLET BY MOUTH EVERY MORNING    lancets Misc   TRUEPLUS LANCETS 30G  MISC, See Instructions, USE ONCE DAILY AS DIRECTED, # 100 EA, 3 Refill(s), Pharmacy: [facility], 155, cm, Height/Length Dosing, 07/01/21 9:54:00 CDT, 121.2, kg, Weight Dosing, 07/01/21 9:54:00 CDT    losartan (COZAAR) 100 mg, Oral, Daily    omeprazole (PRILOSEC) 20 mg, Oral, Daily    ONETOUCH DELICA PLUS LANCET 33 gauge Misc USE ONCE DAILY AS DIRECTED    ONETOUCH ULTRA TEST Strp USE TO TEST BLOOD SUGAR ONCE DAILY    oxybutynin (DITROPAN) 5 mg, Oral, 3 times daily    OZEMPIC 0.5 mg, Subcutaneous, Every 7 days    pioglitazone (ACTOS) 30 mg, Oral, Daily    traZODone (DESYREL) 200 mg, Oral, Nightly PRN    TRUEPLUS LANCETS 30 gauge Misc USE ONCE DAILY AS DIRECTED       Review of patient's allergies indicates:  No Known Allergies     Patient Care Team:  Fei Smith DO as PCP - General (Family Medicine)  Care, [facility] L [facility] as Consulting Physician (Optometry)  Santo Barrientos NP as Consulting Physician (Psychology)  Karlie Cardozo NP as Consulting Physician (Psychology)  Shelley Connor MA     Subjective:     Review of Systems    12 point review of systems conducted, negative except as stated in the history of present illness. See HPI for details.    Objective:     Visit Vitals  BP (!) 157/93 (BP Location: Right arm, Patient Position: Sitting, BP Method: Large (Automatic))   Pulse 84   Temp 97.8 °F (36.6 °C)   Resp (!) 0   Ht 5' 2" (1.575 m)   Wt 105.7 kg (233 lb)   BMI 42.62 kg/m²       Physical Exam  Vitals and nursing note reviewed.   Constitutional:       General: She is not in acute distress.     Appearance: She is obese. She is not ill-appearing.   HENT:      Head: Normocephalic and atraumatic.      Mouth/Throat:      Mouth: Mucous membranes are moist.      Pharynx: Oropharynx " is clear.   Eyes:      General: No scleral icterus.     Extraocular Movements: Extraocular movements intact.      Conjunctiva/sclera: Conjunctivae normal.      Pupils: Pupils are equal, round, and reactive to light.   Neck:      Vascular: No carotid bruit.   Cardiovascular:      Rate and Rhythm: Normal rate and regular rhythm.      Heart sounds: No murmur heard.     No friction rub. No gallop.   Pulmonary:      Effort: Pulmonary effort is normal. No respiratory distress.      Breath sounds: Normal breath sounds. No wheezing, rhonchi or rales.   Abdominal:      General: Abdomen is flat. Bowel sounds are normal. There is no distension.      Palpations: Abdomen is soft. There is no mass.      Tenderness: There is no abdominal tenderness.   Musculoskeletal:         General: Normal range of motion.      Cervical back: Normal range of motion and neck supple.   Skin:     General: Skin is warm and dry.   Neurological:      General: No focal deficit present.      Mental Status: She is alert.   Psychiatric:         Mood and Affect: Mood normal.         Labs Reviewed:     Chemistry:  Lab Results   Component Value Date     03/19/2024    K 4.7 03/19/2024    CHLORIDE 105 03/19/2024    BUN 14.0 03/19/2024    CREATININE 0.81 03/19/2024    EGFRNORACEVR >60 03/19/2024    GLUCOSE 222 (H) 03/19/2024    CALCIUM 9.6 03/19/2024    ALKPHOS 136 03/19/2024    LABPROT 7.5 03/19/2024    ALBUMIN 3.6 03/19/2024    BILIDIR 0.2 02/08/2022    IBILI 0.30 02/08/2022    AST 10 03/19/2024    ALT 17 03/19/2024    MG 2.00 04/12/2021    TSH 2.7489 02/08/2022        Lab Results   Component Value Date    HGBA1C 8.4 (H) 03/19/2024        Hematology:  Lab Results   Component Value Date    WBC 10.81 12/21/2023    HGB 15.2 12/21/2023    HCT 49.5 (H) 12/21/2023     12/21/2023       Lipid Panel:  Lab Results   Component Value Date    CHOL 161 03/19/2024    HDL 38 03/19/2024    LDL 92.00 03/19/2024    TRIG 155 (H) 03/19/2024    TOTALCHOLEST 4  03/19/2024        Urine:  Lab Results   Component Value Date    COLORUA Yellow 08/09/2023    APPEARANCEUA Clear 08/09/2023    SGUA 1.015 08/09/2023    PHUA 6.0 08/09/2023    PROTEINUA Negative 08/09/2023    GLUCOSEUA 3+ (A) 08/09/2023    KETONESUA Negative 08/09/2023    BLOODUA Negative 08/09/2023    NITRITESUA Negative 08/09/2023    LEUKOCYTESUR Negative 08/09/2023    RBCUA 0-1 02/08/2022    WBCUA 0-1 02/08/2022    BACTERIA None Seen 02/08/2022    CREATRANDUR 69.3 03/19/2024        Assessment:       ICD-10-CM ICD-9-CM   1. Type 2 diabetes mellitus with hyperglycemia, without long-term current use of insulin  E11.65 250.00     790.29   2. Mixed hyperlipidemia  E78.2 272.2   3. Primary hypertension  I10 401.9        Plan:     1. Type 2 diabetes mellitus with hyperglycemia, without long-term current use of insulin  Assessment & Plan:  Lab Results   Component Value Date    HGBA1C 8.4 (H) 03/19/2024    HGBA1C 8.7 (H) 12/19/2023    LDL 92.00 03/19/2024    CREATININE 0.81 03/19/2024      Increase Ozempic to 0.5 mg weekly.   Continue Jardiance 25 mg daily + Glipizide 10 mg BID + Actos 30 mg daily.   On ARB and Statin according to guidelines.  Discussed caution with SGLT2s + diuretics as concomitant use can cause volume depletion.  Follow ADA Diet. Avoid soda, simple sweets, and limit rice/pasta/breads/starches (no more than 45-50 grams per meal).  Maintain healthy weight with goal BMI <30.  Exercise 5 times per week for 30 minutes per day.  Stressed importance of daily foot exams.  Stressed importance of annual dilated eye exam.    Orders:  -     semaglutide (OZEMPIC) 0.25 mg or 0.5 mg (2 mg/3 mL) pen injector; Inject 0.5 mg into the skin every 7 days.  Dispense: 3 mL; Refill: 2  -     Hemoglobin A1C; Future; Expected date: 06/17/2024  -     Comprehensive Metabolic Panel; Future; Expected date: 06/17/2024  -     Lipid Panel; Future; Expected date: 06/17/2024    2. Mixed hyperlipidemia  Assessment & Plan:  Lab Results    Component Value Date    LDL 92.00 03/19/2024    TRIG 155 (H) 03/19/2024    HDL 38 03/19/2024    TOTALCHOLEST 4 03/19/2024     Triglycerides have improved.   Continue Atorvastatin 80 mg + Fenofibrate 54 mg daily.   Follow a low cholesterol, low saturated fat diet with less that 200mg of cholesterol a day.  Avoid fried foods and high saturated fats (high saturated fats less than 7% of calories).  Add Flax Seed/Fish Oil supplements to diet. Increase dietary fiber.  Regular exercise can reduce LDL and raise HDL. Stressed importance of physical activity 5 times per week for 30 minutes per day.     Orders:  -     Comprehensive Metabolic Panel; Future; Expected date: 06/17/2024  -     Lipid Panel; Future; Expected date: 06/17/2024    3. Primary hypertension  Assessment & Plan:  Increase HCTZ to 25 mg daily.   Continue Losartan 100 mg daily.   Low Sodium Diet (DASH Diet - Less than 2 grams of sodium per day).  Monitor blood pressure daily and log. Report consistent numbers greater than 140/90.  Maintain healthy weight with goal BMI <30. Exercise 30 minutes per day, 5 days per week.    Orders:  -     hydroCHLOROthiazide (HYDRODIURIL) 25 MG tablet; Take 1 tablet (25 mg total) by mouth once daily.  Dispense: 30 tablet; Refill: 2      Follow up in about 3 months (around 6/21/2024) for DM II Follow Up. In addition to their scheduled follow up, the patient has also been instructed to follow up on as needed basis.     Future Appointments   Date Time Provider Department Center   6/25/2024  1:00 PM Fei Smith DO [facility]    12/24/2024  1:00 PM Smiley Natarajan NP [facility]        Smiley Natarajan NP

## 2024-03-21 NOTE — ASSESSMENT & PLAN NOTE
Increase HCTZ to 25 mg daily.   Continue Losartan 100 mg daily.   Low Sodium Diet (DASH Diet - Less than 2 grams of sodium per day).  Monitor blood pressure daily and log. Report consistent numbers greater than 140/90.  Maintain healthy weight with goal BMI <30. Exercise 30 minutes per day, 5 days per week.

## 2024-03-21 NOTE — ASSESSMENT & PLAN NOTE
Lab Results   Component Value Date    HGBA1C 8.4 (H) 03/19/2024    HGBA1C 8.7 (H) 12/19/2023    LDL 92.00 03/19/2024    CREATININE 0.81 03/19/2024      Increase Ozempic to 0.5 mg weekly.   Continue Jardiance 25 mg daily + Glipizide 10 mg BID + Actos 30 mg daily.   On ARB and Statin according to guidelines.  Discussed caution with SGLT2s + diuretics as concomitant use can cause volume depletion.  Follow ADA Diet. Avoid soda, simple sweets, and limit rice/pasta/breads/starches (no more than 45-50 grams per meal).  Maintain healthy weight with goal BMI <30.  Exercise 5 times per week for 30 minutes per day.  Stressed importance of daily foot exams.  Stressed importance of annual dilated eye exam.

## 2024-03-21 NOTE — ASSESSMENT & PLAN NOTE
Lab Results   Component Value Date    LDL 92.00 03/19/2024    TRIG 155 (H) 03/19/2024    HDL 38 03/19/2024    TOTALCHOLEST 4 03/19/2024     Triglycerides have improved.   Continue Atorvastatin 80 mg + Fenofibrate 54 mg daily.   Follow a low cholesterol, low saturated fat diet with less that 200mg of cholesterol a day.  Avoid fried foods and high saturated fats (high saturated fats less than 7% of calories).  Add Flax Seed/Fish Oil supplements to diet. Increase dietary fiber.  Regular exercise can reduce LDL and raise HDL. Stressed importance of physical activity 5 times per week for 30 minutes per day.

## 2024-04-05 ENCOUNTER — TELEPHONE (OUTPATIENT)
Dept: FAMILY MEDICINE | Facility: CLINIC | Age: 54
End: 2024-04-05
Payer: COMMERCIAL

## 2024-04-05 DIAGNOSIS — G47.00 INSOMNIA, UNSPECIFIED TYPE: Primary | ICD-10-CM

## 2024-04-05 RX ORDER — TRAZODONE HYDROCHLORIDE 100 MG/1
200 TABLET ORAL NIGHTLY PRN
Qty: 60 TABLET | Refills: 5 | Status: SHIPPED | OUTPATIENT
Start: 2024-04-05

## 2024-04-05 NOTE — TELEPHONE ENCOUNTER
----- Message from Elly Barrientos sent at 4/5/2024  8:28 AM CDT -----  Patient needing refill on sleeping medication

## 2024-04-16 ENCOUNTER — OFFICE VISIT (OUTPATIENT)
Dept: FAMILY MEDICINE | Facility: CLINIC | Age: 54
End: 2024-04-16
Payer: MEDICAID

## 2024-04-16 ENCOUNTER — LAB VISIT (OUTPATIENT)
Dept: LAB | Facility: HOSPITAL | Age: 54
End: 2024-04-16
Payer: COMMERCIAL

## 2024-04-16 VITALS
DIASTOLIC BLOOD PRESSURE: 103 MMHG | BODY MASS INDEX: 42.81 KG/M2 | OXYGEN SATURATION: 98 % | WEIGHT: 232.63 LBS | TEMPERATURE: 98 F | HEIGHT: 62 IN | RESPIRATION RATE: 20 BRPM | SYSTOLIC BLOOD PRESSURE: 172 MMHG | HEART RATE: 81 BPM

## 2024-04-16 DIAGNOSIS — R10.2 ACUTE PELVIC PAIN: ICD-10-CM

## 2024-04-16 DIAGNOSIS — Z12.4 PAP SMEAR FOR CERVICAL CANCER SCREENING: Primary | ICD-10-CM

## 2024-04-16 DIAGNOSIS — R10.30 LOWER ABDOMINAL PAIN: ICD-10-CM

## 2024-04-16 LAB
AMORPH URATE CRY URNS QL MICRO: ABNORMAL /HPF
APPEARANCE UR: ABNORMAL
B-HCG SERPL QL: NEGATIVE
BACTERIA #/AREA URNS AUTO: ABNORMAL /HPF
BILIRUB UR QL STRIP.AUTO: NEGATIVE
COLOR UR AUTO: YELLOW
GLUCOSE UR QL STRIP.AUTO: ABNORMAL
KETONES UR QL STRIP.AUTO: ABNORMAL
LEUKOCYTE ESTERASE UR QL STRIP.AUTO: NEGATIVE
NITRITE UR QL STRIP.AUTO: POSITIVE
PH UR STRIP.AUTO: 5.5 [PH]
PROT UR QL STRIP.AUTO: ABNORMAL
RBC #/AREA URNS AUTO: ABNORMAL /HPF
RBC UR QL AUTO: NEGATIVE
SP GR UR STRIP.AUTO: >=1.03 (ref 1–1.03)
SQUAMOUS #/AREA URNS AUTO: ABNORMAL /HPF
UROBILINOGEN UR STRIP-ACNC: 0.2
WBC #/AREA URNS AUTO: ABNORMAL /HPF

## 2024-04-16 PROCEDURE — 3052F HG A1C>EQUAL 8.0%<EQUAL 9.0%: CPT | Mod: CPTII,,,

## 2024-04-16 PROCEDURE — 99214 OFFICE O/P EST MOD 30 MIN: CPT | Mod: ,,,

## 2024-04-16 PROCEDURE — 3080F DIAST BP >= 90 MM HG: CPT | Mod: CPTII,,,

## 2024-04-16 PROCEDURE — 81003 URINALYSIS AUTO W/O SCOPE: CPT

## 2024-04-16 PROCEDURE — 99459 PELVIC EXAMINATION: CPT | Mod: ,,,

## 2024-04-16 PROCEDURE — 3066F NEPHROPATHY DOC TX: CPT | Mod: CPTII,,,

## 2024-04-16 PROCEDURE — 4010F ACE/ARB THERAPY RXD/TAKEN: CPT | Mod: CPTII,,,

## 2024-04-16 PROCEDURE — 1160F RVW MEDS BY RX/DR IN RCRD: CPT | Mod: CPTII,,,

## 2024-04-16 PROCEDURE — 3077F SYST BP >= 140 MM HG: CPT | Mod: CPTII,,,

## 2024-04-16 PROCEDURE — 87086 URINE CULTURE/COLONY COUNT: CPT

## 2024-04-16 PROCEDURE — 3061F NEG MICROALBUMINURIA REV: CPT | Mod: CPTII,,,

## 2024-04-16 PROCEDURE — 1159F MED LIST DOCD IN RCRD: CPT | Mod: CPTII,,,

## 2024-04-16 PROCEDURE — 3008F BODY MASS INDEX DOCD: CPT | Mod: CPTII,,,

## 2024-04-16 PROCEDURE — 81025 URINE PREGNANCY TEST: CPT

## 2024-04-16 RX ORDER — METRONIDAZOLE 500 MG/1
500 TABLET ORAL EVERY 12 HOURS
Qty: 14 TABLET | Refills: 0 | Status: SHIPPED | OUTPATIENT
Start: 2024-04-16 | End: 2024-04-23

## 2024-04-16 RX ORDER — IBUPROFEN 600 MG/1
600 TABLET ORAL EVERY 8 HOURS PRN
Qty: 20 TABLET | Refills: 0 | Status: SHIPPED | OUTPATIENT
Start: 2024-04-16 | End: 2024-04-23

## 2024-04-16 NOTE — PROGRESS NOTES
Patient ID: [id number]     Chief Complaint: Pelvic Pain (With foul smell no discharge x1 week/Painful during intercourse//No burning on urination) and Abdominal Pain (Upper abdominal pain and bloated)      HPI:     Helena Vazquez is a 54 y.o. female who presents for evaluation of abdominal pain and bloating. The pain is described as aching, burning, cramping, and pressure-like, and is 7/10 in intensity. Pain is located in the suprapubic, RLQ, LLQ, and perineal area without radiation. Onset was ongoing occurring 7 days ago. Symptoms have been unchanged since. Aggravating factors: sexual activity. Alleviating factors: none. The patient denies belching, constipation, diarrhea, fever, and myalgias. Last bowel movement was yesterday and was normal in consistently and color.Risk factors for pelvic/abdominal pain include of trichomonas. She does not remember if she was treated. Her partner was not treated in .     Past Medical History:   Diagnosis Date    Asthma     Bipolar disorder, unspecified     Diabetes mellitus type 2 in obese     DUB (dysfunctional uterine bleeding)     Edema leg     Family history of breast cancer in sister     x2    Hepatic steatosis     History of infection due to human papilloma virus (HPV)     HLD (hyperlipidemia)     HTN (hypertension)     Hypercholesterolemia     Hyperlipidemia due to type 2 diabetes mellitus 2022    Hypertriglyceridemia     WALTER (iron deficiency anemia)     Microcytic anemia     Morbid obesity     Schizoaffective disorder     Type 2 diabetes mellitus     Type 2 diabetes mellitus with hyperglycemia     Urge incontinence of urine     Vision blurred         Past Surgical History:   Procedure Laterality Date     SECTION  1994     SECTION  1991     SECTION WITH TUBAL LIGATION  1998    CHOLECYSTECTOMY  1989    HERNIA REPAIR          Social History     Socioeconomic History    Marital status:    Tobacco Use    Smoking  status: Former    Smokeless tobacco: Never   Substance and Sexual Activity    Alcohol use: Never    Drug use: Never    Sexual activity: Yes     Birth control/protection: Surgical     Social Determinants of Health     Financial Resource Strain: Low Risk  (6/14/2023)    Overall Financial Resource Strain (CARDIA)     Difficulty of Paying Living Expenses: Not hard at all   Food Insecurity: No Food Insecurity (6/14/2023)    Hunger Vital Sign     Worried About Running Out of Food in the Last Year: Never true     Ran Out of Food in the Last Year: Never true   Transportation Needs: No Transportation Needs (6/14/2023)    PRAPARE - Transportation     Lack of Transportation (Medical): No     Lack of Transportation (Non-Medical): No   Physical Activity: Insufficiently Active (6/14/2023)    Exercise Vital Sign     Days of Exercise per Week: 5 days     Minutes of Exercise per Session: 20 min   Stress: Stress Concern Present (6/14/2023)    [place] [employer] - Occupational Stress Questionnaire     Feeling of Stress : To some extent   Social Connections: Moderately Integrated (6/14/2023)    Social Connection and Isolation Panel [NHANES]     Frequency of Communication with Friends and Family: More than three times a week     Frequency of Social Gatherings with Friends and Family: More than three times a week     Attends Church Services: More than 4 times per year     Active Member of Clubs or Organizations: Yes     Attends Club or Organization Meetings: 1 to 4 times per year     Marital Status:    Housing Stability: Low Risk  (6/14/2023)    Housing Stability Vital Sign     Unable to Pay for Housing in the Last Year: No     Number of Places Lived in the Last Year: 1     Unstable Housing in the Last Year: No        Current Outpatient Medications   Medication Instructions    albuterol-ipratropium (DUO-NEB) 2.5 mg-0.5 mg/3 mL nebulizer solution   See Instructions, INHALE 1 VIAL VIA NEBULIZER FOUR TIMES A  DAY, # 90 mL, 5 Refill(s), Pharmacy: [facility], 155, cm, Height/Length Dosing, 12/21/21 7:40:00 CST, 119, kg, Weight Dosing, 12/21/21 7:40:00 CST    alcohol antiseptic pads (ALCOHOL SWABS TOP)   alcohol swabs, See Instructions, use alcohol swab on injection site prior to injection, # 1 box(es), 3 Refill(s), Pharmacy: [facility], 155, cm, Height/Length Dosing, 02/09/22 9:59:00 CST, 116.4, kg, Weight Dosing, 02/09/22 9:59...    atorvastatin (LIPITOR) 80 mg, Oral, Daily    fenofibrate (TRICOR) 54 mg, Oral, Daily    glipiZIDE (GLUCOTROL) 10 mg, Oral, 2 times daily    hydroCHLOROthiazide (HYDRODIURIL) 25 mg, Oral, Daily    ibuprofen (ADVIL,MOTRIN) 600 mg, Oral, Every 8 hours PRN    JARDIANCE 25 mg tablet TAKE ONE TABLET BY MOUTH EVERY MORNING    lancets Misc   TRUEPLUS LANCETS 30G  MISC, See Instructions, USE ONCE DAILY AS DIRECTED, # 100 EA, 3 Refill(s), Pharmacy: [facility], 155, cm, Height/Length Dosing, 07/01/21 9:54:00 CDT, 121.2, kg, Weight Dosing, 07/01/21 9:54:00 CDT    losartan (COZAAR) 100 mg, Oral, Daily    metroNIDAZOLE (FLAGYL) 500 mg, Oral, Every 12 hours    omeprazole (PRILOSEC) 20 mg, Oral, Daily    ONETOUCH DELICA PLUS LANCET 33 gauge Misc USE ONCE DAILY AS DIRECTED    ONETOUCH ULTRA TEST Strp USE TO TEST BLOOD SUGAR ONCE DAILY    oxybutynin (DITROPAN) 5 mg, Oral, 3 times daily    OZEMPIC 0.5 mg, Subcutaneous, Every 7 days    pioglitazone (ACTOS) 30 mg, Oral, Daily    traZODone (DESYREL) 200 mg, Oral, Nightly PRN    TRUEPLUS LANCETS 30 gauge Misc USE ONCE DAILY AS DIRECTED       Review of patient's allergies indicates:  No Known Allergies     Patient Care Team:  Fei Smith DO as PCP - General (Family Medicine)  Care, Jorge L Eye as Consulting Physician (Optometry)  Santo Barrientos NP as Consulting Physician (Psychology)  Karlie Cardozo NP as Consulting Physician (Psychology)  Shelley Connor MA Karr, Jacob R  "MD as Consulting Physician (Gastroenterology)     Subjective:     Review of Systems    12 point review of systems conducted, negative except as stated in the history of present illness. See HPI for details.    Objective:     Visit Vitals  BP (!) 172/103 (BP Location: Left arm, Patient Position: Sitting, BP Method: Large (Automatic))   Pulse 81   Temp 97.9 °F (36.6 °C)   Resp 20   Ht 5' 2" (1.575 m)   Wt 105.5 kg (232 lb 9.6 oz)   SpO2 98%   BMI 42.54 kg/m²       Physical Exam  Vitals and nursing note reviewed. Exam conducted with a chaperone present (Mattie Barrientos LPN).   Constitutional:       General: She is not in acute distress.     Appearance: She is not ill-appearing.   HENT:      Head: Normocephalic and atraumatic.      Mouth/Throat:      Mouth: Mucous membranes are moist.      Pharynx: Oropharynx is clear.   Eyes:      General: No scleral icterus.     Extraocular Movements: Extraocular movements intact.      Conjunctiva/sclera: Conjunctivae normal.      Pupils: Pupils are equal, round, and reactive to light.   Neck:      Vascular: No carotid bruit.   Cardiovascular:      Rate and Rhythm: Normal rate and regular rhythm.      Heart sounds: No murmur heard.     No friction rub. No gallop.   Pulmonary:      Effort: Pulmonary effort is normal. No respiratory distress.      Breath sounds: Normal breath sounds. No wheezing, rhonchi or rales.   Abdominal:      General: Abdomen is protuberant. Bowel sounds are normal. There is distension.      Palpations: Abdomen is soft. There is no mass.      Tenderness: There is abdominal tenderness in the right lower quadrant, suprapubic area and left lower quadrant.   Genitourinary:     Exam position: Lithotomy position.      Labia:         Right: No rash, tenderness or lesion.         Left: No rash, tenderness or lesion.       Vagina: Erythema present.      Cervix: Discharge (yellow, green discharge associated with smell) present.   Musculoskeletal:         General: Normal " range of motion.      Cervical back: Normal range of motion and neck supple.   Skin:     General: Skin is warm and dry.   Neurological:      General: No focal deficit present.      Mental Status: She is alert.   Psychiatric:         Mood and Affect: Mood normal.       Labs Reviewed:     Chemistry:  Lab Results   Component Value Date     03/19/2024    K 4.7 03/19/2024    CHLORIDE 105 03/19/2024    BUN 14.0 03/19/2024    CREATININE 0.81 03/19/2024    EGFRNORACEVR >60 03/19/2024    GLUCOSE 222 (H) 03/19/2024    CALCIUM 9.6 03/19/2024    ALKPHOS 136 03/19/2024    LABPROT 7.5 03/19/2024    ALBUMIN 3.6 03/19/2024    BILIDIR 0.2 02/08/2022    IBILI 0.30 02/08/2022    AST 10 03/19/2024    ALT 17 03/19/2024    MG 2.00 04/12/2021    TSH 2.7489 02/08/2022        Lab Results   Component Value Date    HGBA1C 8.4 (H) 03/19/2024        Hematology:  Lab Results   Component Value Date    WBC 10.81 12/21/2023    HGB 15.2 12/21/2023    HCT 49.5 (H) 12/21/2023     12/21/2023       Lipid Panel:  Lab Results   Component Value Date    CHOL 161 03/19/2024    HDL 38 03/19/2024    LDL 92.00 03/19/2024    TRIG 155 (H) 03/19/2024    TOTALCHOLEST 4 03/19/2024        Urine:  Lab Results   Component Value Date    COLORUA Yellow 08/09/2023    APPEARANCEUA Clear 08/09/2023    SGUA 1.015 08/09/2023    PHUA 6.0 08/09/2023    PROTEINUA Negative 08/09/2023    GLUCOSEUA 3+ (A) 08/09/2023    KETONESUA Negative 08/09/2023    BLOODUA Negative 08/09/2023    NITRITESUA Negative 08/09/2023    LEUKOCYTESUR Negative 08/09/2023    RBCUA 0-1 02/08/2022    WBCUA 0-1 02/08/2022    BACTERIA None Seen 02/08/2022    CREATRANDUR 69.3 03/19/2024        Assessment:       ICD-10-CM ICD-9-CM   1. Pap smear for cervical cancer screening  Z12.4 V76.2   2. Lower abdominal pain  R10.30 789.09   3. Acute pelvic pain  R10.2 [id number]        Plan:     1. Pap smear for cervical cancer screening  -     Liquid-Based Pap Smear, Screening Screening; Future; Expected date:  04/16/2024    2. Lower abdominal pain  -     Liquid-Based Pap Smear, Screening Screening; Future; Expected date: 04/16/2024  -     US Abdomen Complete; Future; Expected date: 04/16/2024  -     ibuprofen (ADVIL,MOTRIN) 600 MG tablet; Take 1 tablet (600 mg total) by mouth every 8 (eight) hours as needed for Pain.  Dispense: 20 tablet; Refill: 0    3. Acute pelvic pain  -     Liquid-Based Pap Smear, Screening Screening; Future; Expected date: 04/16/2024  -     Cancel: US Pelvis Complete Non OB; Future; Expected date: 04/16/2024  -     US Transvaginal Non OB; Future; Expected date: 04/16/2024  -     Urinalysis, Reflex to Urine Culture; Future; Expected date: 04/16/2024  -     HCG Qualitative Urine; Future; Expected date: 04/16/2024  -     metroNIDAZOLE (FLAGYL) 500 MG tablet; Take 1 tablet (500 mg total) by mouth every 12 (twelve) hours. for 7 days  Dispense: 14 tablet; Refill: 0  -     ibuprofen (ADVIL,MOTRIN) 600 MG tablet; Take 1 tablet (600 mg total) by mouth every 8 (eight) hours as needed for Pain.  Dispense: 20 tablet; Refill: 0  -     US Pelvis Comp with Transvag NON-OB (xpd; Future; Expected date: 04/16/2024       Follow up for Keep Scheduled Appointment.. In addition to their scheduled follow up, the patient has also been instructed to follow up on as needed basis.     Future Appointments   Date Time Provider Department Center   6/25/2024  1:00 PM Fei Smith DO [facility]    12/24/2024  1:00 PM Smiley Natarajan NP [facility]        Smiley Natarajan NP

## 2024-04-17 ENCOUNTER — LAB VISIT (OUTPATIENT)
Dept: LAB | Facility: HOSPITAL | Age: 54
End: 2024-04-17
Payer: MEDICAID

## 2024-04-17 DIAGNOSIS — R10.2 ACUTE PELVIC PAIN: ICD-10-CM

## 2024-04-17 DIAGNOSIS — R10.30 LOWER ABDOMINAL PAIN: ICD-10-CM

## 2024-04-17 LAB
ALBUMIN SERPL-MCNC: 3.3 G/DL (ref 3.5–5)
ALBUMIN/GLOB SERPL: 0.9 RATIO (ref 1.1–2)
ALP SERPL-CCNC: 139 UNIT/L (ref 40–150)
ALT SERPL-CCNC: 18 UNIT/L (ref 0–55)
AST SERPL-CCNC: 11 UNIT/L (ref 5–34)
BASOPHILS # BLD AUTO: 0.04 X10(3)/MCL
BASOPHILS NFR BLD AUTO: 0.4 %
BILIRUB SERPL-MCNC: 0.4 MG/DL
BUN SERPL-MCNC: 10 MG/DL (ref 9.8–20.1)
CALCIUM SERPL-MCNC: 9.1 MG/DL (ref 8.4–10.2)
CHLORIDE SERPL-SCNC: 104 MMOL/L (ref 98–107)
CO2 SERPL-SCNC: 28 MMOL/L (ref 22–29)
CREAT SERPL-MCNC: 0.78 MG/DL (ref 0.55–1.02)
EOSINOPHIL # BLD AUTO: 0.31 X10(3)/MCL (ref 0–0.9)
EOSINOPHIL NFR BLD AUTO: 3.4 %
ERYTHROCYTE [DISTWIDTH] IN BLOOD BY AUTOMATED COUNT: 13.7 % (ref 11.5–17)
GFR SERPLBLD CREATININE-BSD FMLA CKD-EPI: >60 MLS/MIN/1.73/M2
GLOBULIN SER-MCNC: 3.8 GM/DL (ref 2.4–3.5)
GLUCOSE SERPL-MCNC: 249 MG/DL (ref 74–100)
HCT VFR BLD AUTO: 44.1 % (ref 37–47)
HGB BLD-MCNC: 14 G/DL (ref 12–16)
IMM GRANULOCYTES # BLD AUTO: 0.04 X10(3)/MCL (ref 0–0.04)
IMM GRANULOCYTES NFR BLD AUTO: 0.4 %
LYMPHOCYTES # BLD AUTO: 2.75 X10(3)/MCL (ref 0.6–4.6)
LYMPHOCYTES NFR BLD AUTO: 30.3 %
MCH RBC QN AUTO: 26.9 PG (ref 27–31)
MCHC RBC AUTO-ENTMCNC: 31.7 G/DL (ref 33–36)
MCV RBC AUTO: 84.8 FL (ref 80–94)
MONOCYTES # BLD AUTO: 0.46 X10(3)/MCL (ref 0.1–1.3)
MONOCYTES NFR BLD AUTO: 5.1 %
NEUTROPHILS # BLD AUTO: 5.49 X10(3)/MCL (ref 2.1–9.2)
NEUTROPHILS NFR BLD AUTO: 60.4 %
NRBC BLD AUTO-RTO: 0 %
PLATELET # BLD AUTO: 278 X10(3)/MCL (ref 130–400)
PMV BLD AUTO: 9.5 FL (ref 7.4–10.4)
POTASSIUM SERPL-SCNC: 4.8 MMOL/L (ref 3.5–5.1)
PROT SERPL-MCNC: 7.1 GM/DL (ref 6.4–8.3)
RBC # BLD AUTO: 5.2 X10(6)/MCL (ref 4.2–5.4)
SODIUM SERPL-SCNC: 139 MMOL/L (ref 136–145)
WBC # SPEC AUTO: 9.09 X10(3)/MCL (ref 4.5–11.5)

## 2024-04-17 PROCEDURE — 80053 COMPREHEN METABOLIC PANEL: CPT

## 2024-04-17 PROCEDURE — 85025 COMPLETE CBC W/AUTO DIFF WBC: CPT

## 2024-04-17 PROCEDURE — 87480 CANDIDA DNA DIR PROBE: CPT

## 2024-04-17 PROCEDURE — 87591 N.GONORRHOEAE DNA AMP PROB: CPT

## 2024-04-17 PROCEDURE — 87491 CHLMYD TRACH DNA AMP PROBE: CPT

## 2024-04-17 PROCEDURE — 36415 COLL VENOUS BLD VENIPUNCTURE: CPT

## 2024-04-17 PROCEDURE — 87529 HSV DNA AMP PROBE: CPT

## 2024-04-17 PROCEDURE — 87624 HPV HI-RISK TYP POOLED RSLT: CPT

## 2024-04-17 PROCEDURE — 87661 TRICHOMONAS VAGINALIS AMPLIF: CPT

## 2024-04-17 PROCEDURE — 87510 GARDNER VAG DNA DIR PROBE: CPT

## 2024-04-18 ENCOUNTER — TELEPHONE (OUTPATIENT)
Dept: FAMILY MEDICINE | Facility: CLINIC | Age: 54
End: 2024-04-18
Payer: COMMERCIAL

## 2024-04-18 LAB — BACTERIA UR CULT: NO GROWTH

## 2024-04-18 NOTE — TELEPHONE ENCOUNTER
Attempted to give results no answer left normal results on voicemail and explained to call back with any questions.

## 2024-04-18 NOTE — TELEPHONE ENCOUNTER
----- Message from Smiley Natarajan NP sent at 4/18/2024  9:43 AM CDT -----  All lab results within acceptable ranges. Still waiting on Pap results.

## 2024-04-18 NOTE — TELEPHONE ENCOUNTER
----- Message from Smiley Natarajan NP sent at 4/18/2024  9:43 AM CDT -----  All lab results within acceptable ranges.

## 2024-04-19 ENCOUNTER — HOSPITAL ENCOUNTER (OUTPATIENT)
Dept: RADIOLOGY | Facility: HOSPITAL | Age: 54
Discharge: HOME | End: 2024-04-19
Payer: MEDICAID

## 2024-04-19 DIAGNOSIS — R10.2 ACUTE PELVIC PAIN: ICD-10-CM

## 2024-04-19 DIAGNOSIS — R10.30 LOWER ABDOMINAL PAIN: ICD-10-CM

## 2024-04-19 PROCEDURE — 76700 US EXAM ABDOM COMPLETE: CPT | Mod: TC

## 2024-04-19 PROCEDURE — 76856 US EXAM PELVIC COMPLETE: CPT | Mod: TC

## 2024-04-22 ENCOUNTER — TELEPHONE (OUTPATIENT)
Dept: FAMILY MEDICINE | Facility: CLINIC | Age: 54
End: 2024-04-22
Payer: COMMERCIAL

## 2024-04-22 DIAGNOSIS — R93.5 ABNORMAL US (ULTRASOUND) OF ABDOMEN: Primary | ICD-10-CM

## 2024-04-22 DIAGNOSIS — K76.0 HEPATIC STEATOSIS: Primary | ICD-10-CM

## 2024-04-22 NOTE — TELEPHONE ENCOUNTER
US ABD and US Pelvis results explained to the patient over the phone. Will order CT ABD with and without contrast for further evaluation of the liver.

## 2024-04-22 NOTE — TELEPHONE ENCOUNTER
----- Message from Smiley Natarajan NP sent at 4/22/2024  9:44 AM CDT -----  US Pelvis unremarkable.

## 2024-04-23 ENCOUNTER — TELEPHONE (OUTPATIENT)
Dept: FAMILY MEDICINE | Facility: CLINIC | Age: 54
End: 2024-04-23
Payer: COMMERCIAL

## 2024-04-23 DIAGNOSIS — B97.7 HIGH RISK HPV INFECTION: Primary | ICD-10-CM

## 2024-04-23 LAB
CANDIDA SPECIES: NEGATIVE
CHLAMYDIA TRACHOMATIS: NEGATIVE
GARDNERELLA VAGINALIS: POSITIVE
HIGH RISK HPV: POSITIVE
HSV 1: NEGATIVE
HSV 2: NEGATIVE
NEISSERIA GONORRHOEAE: NEGATIVE
PSYCHE PATHOLOGY RESULT: NORMAL
TRICHOMONAS VAGINALIS: NEGATIVE

## 2024-04-23 NOTE — TELEPHONE ENCOUNTER
----- Message from Smiley Natarajan NP sent at 4/23/2024  9:44 AM CDT -----  Please inform patient of lab results.     1.Pap negative for intraepithelial lesions, but + for high risk HPV. Also + for BV. All other molecular testing is negative. Patient was treated with Flagyl on last visit which will cover the BV. Will refer patient to GYN for further evaluation for possible colposcopy.    Thanks for all you do,   Smiley

## 2024-04-24 DIAGNOSIS — J45.909 ASTHMA, UNSPECIFIED ASTHMA SEVERITY, UNSPECIFIED WHETHER COMPLICATED, UNSPECIFIED WHETHER PERSISTENT: Primary | ICD-10-CM

## 2024-04-29 ENCOUNTER — HOSPITAL ENCOUNTER (OUTPATIENT)
Dept: RADIOLOGY | Facility: HOSPITAL | Age: 54
Discharge: HOME | End: 2024-04-29
Payer: COMMERCIAL

## 2024-04-29 ENCOUNTER — TELEPHONE (OUTPATIENT)
Dept: FAMILY MEDICINE | Facility: CLINIC | Age: 54
End: 2024-04-29
Payer: COMMERCIAL

## 2024-04-29 DIAGNOSIS — K76.0 HEPATIC STEATOSIS: ICD-10-CM

## 2024-04-29 PROCEDURE — 74150 CT ABDOMEN W/O CONTRAST: CPT | Mod: TC

## 2024-04-29 PROCEDURE — 25500020 PHARM REV CODE 255

## 2024-04-29 RX ADMIN — DIATRIZOATE MEGLUMINE AND DIATRIZOATE SODIUM 15 ML: 660; 100 LIQUID ORAL; RECTAL at 08:04

## 2024-04-29 NOTE — TELEPHONE ENCOUNTER
Bella with Radiology called stating the patient was there for a ct and she was given the iodine contrast and stated she had a severe allergy to seafood. Bella then asked her why she did not tell her doctor that as it was not in her chart and she stated that the doctor who was here before Dr Enamorado knew and Bella educated her on telling all her providers this information. I told bella I was not sure what to do and transferred the call the Smilye.

## 2024-05-07 ENCOUNTER — TELEPHONE (OUTPATIENT)
Dept: FAMILY MEDICINE | Facility: CLINIC | Age: 54
End: 2024-05-07
Payer: COMMERCIAL

## 2024-05-07 NOTE — TELEPHONE ENCOUNTER
----- Message from Smiley Natarajan NP sent at 5/7/2024  9:16 AM CDT -----  Please inform patient of lab results.     1. Liver has excess fat with several rounded lesion that are unchanged from previous CR. The lesions would need to be further evaluated with contrast, but she refused the contrast. It is recommended that she have a sixth month F/U CT.     Thanks for all you do,   Smiley

## 2024-06-10 ENCOUNTER — TELEPHONE (OUTPATIENT)
Dept: FAMILY MEDICINE | Facility: CLINIC | Age: 54
End: 2024-06-10
Payer: COMMERCIAL

## 2024-06-10 NOTE — TELEPHONE ENCOUNTER
----- Message from Elly Barrientos sent at 6/10/2024  3:35 PM CDT -----  Dr. De La Fuente's office told the patient that the insurance will not cover the colonoscopy, so she is not doing it

## 2024-06-20 ENCOUNTER — LAB VISIT (OUTPATIENT)
Dept: LAB | Facility: HOSPITAL | Age: 54
End: 2024-06-20
Payer: COMMERCIAL

## 2024-06-20 DIAGNOSIS — E78.2 MIXED HYPERLIPIDEMIA: Chronic | ICD-10-CM

## 2024-06-20 DIAGNOSIS — E11.65 TYPE 2 DIABETES MELLITUS WITH HYPERGLYCEMIA, WITHOUT LONG-TERM CURRENT USE OF INSULIN: Chronic | ICD-10-CM

## 2024-06-20 LAB
ALBUMIN SERPL-MCNC: 3.9 G/DL (ref 3.5–5)
ALBUMIN/GLOB SERPL: 0.9 RATIO (ref 1.1–2)
ALP SERPL-CCNC: 140 UNIT/L (ref 40–150)
ALT SERPL-CCNC: 24 UNIT/L (ref 0–55)
ANION GAP SERPL CALC-SCNC: 12 MEQ/L
AST SERPL-CCNC: 14 UNIT/L (ref 5–34)
BILIRUB SERPL-MCNC: 1.1 MG/DL
BUN SERPL-MCNC: 14 MG/DL (ref 9.8–20.1)
CALCIUM SERPL-MCNC: 10.7 MG/DL (ref 8.4–10.2)
CHLORIDE SERPL-SCNC: 100 MMOL/L (ref 98–107)
CHOLEST SERPL-MCNC: 194 MG/DL
CHOLEST/HDLC SERPL: 5 {RATIO} (ref 0–5)
CO2 SERPL-SCNC: 26 MMOL/L (ref 22–29)
CREAT SERPL-MCNC: 1.04 MG/DL (ref 0.55–1.02)
CREAT/UREA NIT SERPL: 13
EST. AVERAGE GLUCOSE BLD GHB EST-MCNC: 197.3 MG/DL
GFR SERPLBLD CREATININE-BSD FMLA CKD-EPI: >60 ML/MIN/1.73/M2
GLOBULIN SER-MCNC: 4.4 GM/DL (ref 2.4–3.5)
GLUCOSE SERPL-MCNC: 225 MG/DL (ref 74–100)
HBA1C MFR BLD: 8.5 %
HDLC SERPL-MCNC: 39 MG/DL (ref 35–60)
LDLC SERPL CALC-MCNC: 117 MG/DL (ref 50–140)
POTASSIUM SERPL-SCNC: 4.1 MMOL/L (ref 3.5–5.1)
PROT SERPL-MCNC: 8.3 GM/DL (ref 6.4–8.3)
SODIUM SERPL-SCNC: 138 MMOL/L (ref 136–145)
TRIGL SERPL-MCNC: 189 MG/DL (ref 37–140)
VLDLC SERPL CALC-MCNC: 38 MG/DL

## 2024-06-20 PROCEDURE — 36415 COLL VENOUS BLD VENIPUNCTURE: CPT

## 2024-06-20 PROCEDURE — 83036 HEMOGLOBIN GLYCOSYLATED A1C: CPT

## 2024-06-20 PROCEDURE — 80061 LIPID PANEL: CPT

## 2024-06-20 PROCEDURE — 80053 COMPREHEN METABOLIC PANEL: CPT

## 2024-06-25 ENCOUNTER — LAB VISIT (OUTPATIENT)
Dept: LAB | Facility: HOSPITAL | Age: 54
End: 2024-06-25
Attending: FAMILY MEDICINE
Payer: COMMERCIAL

## 2024-06-25 ENCOUNTER — OFFICE VISIT (OUTPATIENT)
Dept: FAMILY MEDICINE | Facility: CLINIC | Age: 54
End: 2024-06-25
Payer: COMMERCIAL

## 2024-06-25 VITALS
WEIGHT: 225.38 LBS | RESPIRATION RATE: 20 BRPM | DIASTOLIC BLOOD PRESSURE: 81 MMHG | HEART RATE: 103 BPM | HEIGHT: 62 IN | SYSTOLIC BLOOD PRESSURE: 140 MMHG | BODY MASS INDEX: 41.47 KG/M2 | OXYGEN SATURATION: 96 % | TEMPERATURE: 98 F

## 2024-06-25 DIAGNOSIS — I10 PRIMARY HYPERTENSION: Chronic | ICD-10-CM

## 2024-06-25 DIAGNOSIS — R11.2 NAUSEA AND VOMITING, UNSPECIFIED VOMITING TYPE: ICD-10-CM

## 2024-06-25 DIAGNOSIS — R53.83 FATIGUE, UNSPECIFIED TYPE: ICD-10-CM

## 2024-06-25 DIAGNOSIS — E11.65 TYPE 2 DIABETES MELLITUS WITH HYPERGLYCEMIA, WITHOUT LONG-TERM CURRENT USE OF INSULIN: Primary | Chronic | ICD-10-CM

## 2024-06-25 DIAGNOSIS — R10.13 EPIGASTRIC PAIN: ICD-10-CM

## 2024-06-25 LAB
ALBUMIN SERPL-MCNC: 3.6 G/DL (ref 3.5–5)
ALBUMIN/GLOB SERPL: 0.9 RATIO (ref 1.1–2)
ALP SERPL-CCNC: 135 UNIT/L (ref 40–150)
ALT SERPL-CCNC: 20 UNIT/L (ref 0–55)
AMYLASE SERPL-CCNC: 29 UNIT/L (ref 25–125)
ANION GAP SERPL CALC-SCNC: 12 MEQ/L
AST SERPL-CCNC: 13 UNIT/L (ref 5–34)
BASOPHILS # BLD AUTO: 0.05 X10(3)/MCL
BASOPHILS NFR BLD AUTO: 0.4 %
BILIRUB SERPL-MCNC: 0.7 MG/DL
BUN SERPL-MCNC: 10 MG/DL (ref 9.8–20.1)
CALCIUM SERPL-MCNC: 10.2 MG/DL (ref 8.4–10.2)
CHLORIDE SERPL-SCNC: 103 MMOL/L (ref 98–107)
CO2 SERPL-SCNC: 26 MMOL/L (ref 22–29)
CREAT SERPL-MCNC: 1.29 MG/DL (ref 0.55–1.02)
CREAT/UREA NIT SERPL: 8
EOSINOPHIL # BLD AUTO: 0.27 X10(3)/MCL (ref 0–0.9)
EOSINOPHIL NFR BLD AUTO: 2.2 %
ERYTHROCYTE [DISTWIDTH] IN BLOOD BY AUTOMATED COUNT: 14.4 % (ref 11.5–17)
GFR SERPLBLD CREATININE-BSD FMLA CKD-EPI: 50 ML/MIN/1.73/M2
GLOBULIN SER-MCNC: 4 GM/DL (ref 2.4–3.5)
GLUCOSE SERPL-MCNC: 302 MG/DL (ref 74–100)
HCT VFR BLD AUTO: 45.2 % (ref 37–47)
HGB BLD-MCNC: 14.8 G/DL (ref 12–16)
IMM GRANULOCYTES # BLD AUTO: 0.05 X10(3)/MCL (ref 0–0.04)
IMM GRANULOCYTES NFR BLD AUTO: 0.4 %
LIPASE SERPL-CCNC: 21 U/L
LYMPHOCYTES # BLD AUTO: 3.23 X10(3)/MCL (ref 0.6–4.6)
LYMPHOCYTES NFR BLD AUTO: 26.4 %
MCH RBC QN AUTO: 27.6 PG (ref 27–31)
MCHC RBC AUTO-ENTMCNC: 32.7 G/DL (ref 33–36)
MCV RBC AUTO: 84.2 FL (ref 80–94)
MONOCYTES # BLD AUTO: 0.5 X10(3)/MCL (ref 0.1–1.3)
MONOCYTES NFR BLD AUTO: 4.1 %
NEUTROPHILS # BLD AUTO: 8.14 X10(3)/MCL (ref 2.1–9.2)
NEUTROPHILS NFR BLD AUTO: 66.5 %
NRBC BLD AUTO-RTO: 0 %
PLATELET # BLD AUTO: 330 X10(3)/MCL (ref 130–400)
PMV BLD AUTO: 9.9 FL (ref 7.4–10.4)
POTASSIUM SERPL-SCNC: 4.6 MMOL/L (ref 3.5–5.1)
PROT SERPL-MCNC: 7.6 GM/DL (ref 6.4–8.3)
RBC # BLD AUTO: 5.37 X10(6)/MCL (ref 4.2–5.4)
SODIUM SERPL-SCNC: 141 MMOL/L (ref 136–145)
WBC # BLD AUTO: 12.24 X10(3)/MCL (ref 4.5–11.5)

## 2024-06-25 PROCEDURE — 80053 COMPREHEN METABOLIC PANEL: CPT

## 2024-06-25 PROCEDURE — 3066F NEPHROPATHY DOC TX: CPT | Mod: CPTII,,, | Performed by: FAMILY MEDICINE

## 2024-06-25 PROCEDURE — 3052F HG A1C>EQUAL 8.0%<EQUAL 9.0%: CPT | Mod: CPTII,,, | Performed by: FAMILY MEDICINE

## 2024-06-25 PROCEDURE — 36415 COLL VENOUS BLD VENIPUNCTURE: CPT

## 2024-06-25 PROCEDURE — 3077F SYST BP >= 140 MM HG: CPT | Mod: CPTII,,, | Performed by: FAMILY MEDICINE

## 2024-06-25 PROCEDURE — 3079F DIAST BP 80-89 MM HG: CPT | Mod: CPTII,,, | Performed by: FAMILY MEDICINE

## 2024-06-25 PROCEDURE — 85025 COMPLETE CBC W/AUTO DIFF WBC: CPT

## 2024-06-25 PROCEDURE — 83690 ASSAY OF LIPASE: CPT

## 2024-06-25 PROCEDURE — 3061F NEG MICROALBUMINURIA REV: CPT | Mod: CPTII,,, | Performed by: FAMILY MEDICINE

## 2024-06-25 PROCEDURE — 4010F ACE/ARB THERAPY RXD/TAKEN: CPT | Mod: CPTII,,, | Performed by: FAMILY MEDICINE

## 2024-06-25 PROCEDURE — 1159F MED LIST DOCD IN RCRD: CPT | Mod: CPTII,,, | Performed by: FAMILY MEDICINE

## 2024-06-25 PROCEDURE — 99214 OFFICE O/P EST MOD 30 MIN: CPT | Mod: ,,, | Performed by: FAMILY MEDICINE

## 2024-06-25 PROCEDURE — 1160F RVW MEDS BY RX/DR IN RCRD: CPT | Mod: CPTII,,, | Performed by: FAMILY MEDICINE

## 2024-06-25 PROCEDURE — 82150 ASSAY OF AMYLASE: CPT

## 2024-06-25 PROCEDURE — 3008F BODY MASS INDEX DOCD: CPT | Mod: CPTII,,, | Performed by: FAMILY MEDICINE

## 2024-06-25 RX ORDER — ONDANSETRON 8 MG/1
8 TABLET, ORALLY DISINTEGRATING ORAL 2 TIMES DAILY
Qty: 20 TABLET | Refills: 0 | Status: SHIPPED | OUTPATIENT
Start: 2024-06-25 | End: 2024-07-05

## 2024-06-25 NOTE — PROGRESS NOTES
Patient ID: [id number]     Chief Complaint: Diabetes (3 month check up) and Fatigue (Nausea thinks it is being caused by one of her medications)    HPI:     Helena Vazquez is a 54 y.o. female here today for Diabetes (3 month check up) and Fatigue (Nausea thinks it is being caused by one of her medications). Symptoms started 2 weeks ago with nausea and fatigue. She also reports chest discomfort at night for several days. Her nausea progressed to nausea with vomiting for the past 3 days.       -------------------------------------    Asthma    Bipolar disorder, unspecified    Diabetes mellitus type 2 in obese    DUB (dysfunctional uterine bleeding)    Edema leg    Family history of breast cancer in sister    x2    Hepatic steatosis    History of infection due to human papilloma virus (HPV)    HLD (hyperlipidemia)    HTN (hypertension)    Hypercholesterolemia    Hyperlipidemia due to type 2 diabetes mellitus    Hypertriglyceridemia    WALTER (iron deficiency anemia)    Microcytic anemia    Morbid obesity    Schizoaffective disorder    Type 2 diabetes mellitus    Type 2 diabetes mellitus with hyperglycemia    Urge incontinence of urine    Vision blurred        Past Surgical History:   Procedure Laterality Date     SECTION  1994     SECTION  1991     SECTION WITH TUBAL LIGATION  1998    CHOLECYSTECTOMY  1989    HERNIA REPAIR         Review of patient's allergies indicates:  No Known Allergies    Outpatient Medications Marked as Taking for the 24 encounter (Office Visit) with Fei Smith, DO   Medication Sig Dispense Refill    albuterol-ipratropium (DUO-NEB) 2.5 mg-0.5 mg/3 mL nebulizer solution   See Instructions, INHALE 1 VIAL VIA NEBULIZER FOUR TIMES A DAY, # 90 mL, 5 Refill(s), Pharmacy: [facility] [place], 155, cm, Height/Length Dosing, 21 7:40:00 CST, 119, kg, Weight Dosing, 21 7:40:00 CST      alcohol antiseptic pads (ALCOHOL SWABS TOP)    alcohol swabs, See Instructions, use alcohol swab on injection site prior to injection, # 1 box(es), 3 Refill(s), Pharmacy: [facility], 155, cm, Height/Length Dosing, 02/09/22 9:59:00 CST, 116.4, kg, Weight Dosing, 02/09/22 9:59...      atorvastatin (LIPITOR) 80 MG tablet Take 1 tablet (80 mg total) by mouth once daily. 90 tablet 3    fenofibrate (TRICOR) 54 MG tablet Take 1 tablet (54 mg total) by mouth once daily. 90 tablet 3    glipiZIDE (GLUCOTROL) 10 MG tablet Take 1 tablet (10 mg total) by mouth 2 (two) times daily. 60 tablet 5    hydroCHLOROthiazide (HYDRODIURIL) 25 MG tablet Take 1 tablet (25 mg total) by mouth once daily. 30 tablet 2    JARDIANCE 25 mg tablet TAKE ONE TABLET BY MOUTH EVERY MORNING 30 tablet 3    lancets Misc   TRUEPLUS LANCETS 30G  MISC, See Instructions, USE ONCE DAILY AS DIRECTED, # 100 EA, 3 Refill(s), Pharmacy: [facility], 155, cm, Height/Length Dosing, 07/01/21 9:54:00 CDT, 121.2, kg, Weight Dosing, 07/01/21 9:54:00 CDT      losartan (COZAAR) 100 MG tablet Take 1 tablet (100 mg total) by mouth once daily. 90 tablet 3    omeprazole (PRILOSEC) 20 MG capsule Take 1 capsule (20 mg total) by mouth once daily. 90 capsule 3    ONETOUCH DELICA PLUS LANCET 33 gauge Misc USE ONCE DAILY AS DIRECTED 100 each 3    ONETOUCH ULTRA TEST Strp USE TO TEST BLOOD SUGAR ONCE DAILY 50 strip 5    oxybutynin (DITROPAN) 5 MG Tab Take 1 tablet (5 mg total) by mouth 3 (three) times daily. 90 tablet 3    pioglitazone (ACTOS) 30 MG tablet Take 1 tablet (30 mg total) by mouth once daily. 30 tablet 5    traZODone (DESYREL) 100 MG tablet Take 2 tablets (200 mg total) by mouth nightly as needed for Insomnia. 60 tablet 5    TRUEPLUS LANCETS 30 gauge Misc USE ONCE DAILY AS DIRECTED 100 each 3       Social History     Socioeconomic History    Marital status:    Tobacco Use    Smoking status: Former    Smokeless tobacco: Never   Substance and Sexual Activity     Alcohol use: Never    Drug use: Never    Sexual activity: Yes     Birth control/protection: Surgical     Social Determinants of Health     Financial Resource Strain: Low Risk  (6/14/2023)    Overall Financial Resource Strain (CARDIA)     Difficulty of Paying Living Expenses: Not hard at all   Food Insecurity: No Food Insecurity (6/14/2023)    Hunger Vital Sign     Worried About Running Out of Food in the Last Year: Never true     Ran Out of Food in the Last Year: Never true   Transportation Needs: No Transportation Needs (6/14/2023)    PRAPARE - Transportation     Lack of Transportation (Medical): No     Lack of Transportation (Non-Medical): No   Physical Activity: Insufficiently Active (6/14/2023)    Exercise Vital Sign     Days of Exercise per Week: 5 days     Minutes of Exercise per Session: 20 min   Stress: Stress Concern Present (6/14/2023)    [place] [employer] - Occupational Stress Questionnaire     Feeling of Stress : To some extent   Housing Stability: Low Risk  (6/14/2023)    Housing Stability Vital Sign     Unable to Pay for Housing in the Last Year: No     Number of Places Lived in the Last Year: 1     Unstable Housing in the Last Year: No        Family History   Problem Relation Name Age of Onset    Heart attack Mother      Diabetes Mellitus Mother      Coronary artery disease Mother      Hypertension Mother      Lung cancer Father      Diabetes Mellitus Father      Breast cancer Sister      Breast cancer Sister          Patient Care Team:  Fei Smith DO as PCP - General (Family Medicine)  Care, Jorge L Eye as Consulting Physician (Optometry)  Santo Barrientos NP as Consulting Physician (Psychology)  Karlie Cardozo NP as Consulting Physician (Psychology)  Shelley Connor MA Karr, Jacob R, MD as Consulting Physician (Gastroenterology)     Subjective:     Review of Systems   Constitutional:  Positive for malaise/fatigue. Negative for chills and fever.   HENT:   "Negative for sore throat.    Respiratory:  Negative for shortness of breath.    Cardiovascular:  Positive for chest pain.   Gastrointestinal:  Positive for abdominal pain, nausea and vomiting. Negative for constipation, diarrhea and heartburn.   Neurological:  Positive for headaches.   Psychiatric/Behavioral:  The patient does not have insomnia.        See HPI for details  All Other ROS: Negative except as stated in HPI.       Objective:     BP (!) 140/81 (BP Location: Right arm, Patient Position: Sitting, BP Method: Large (Automatic))   Pulse 103   Temp 97.7 °F (36.5 °C)   Resp 20   Ht 5' 2" (1.575 m)   Wt 102.2 kg (225 lb 6.4 oz)   SpO2 96%   BMI 41.23 kg/m²     Physical Exam  Vitals reviewed.   Constitutional:       General: She is not in acute distress.     Appearance: Normal appearance. She is obese.   Cardiovascular:      Rate and Rhythm: Regular rhythm. Tachycardia present.      Heart sounds: No murmur heard.     No friction rub. No gallop.   Pulmonary:      Effort: No respiratory distress.      Breath sounds: No wheezing, rhonchi or rales.   Musculoskeletal:         General: No swelling, tenderness or deformity.      Right lower leg: No edema.      Left lower leg: No edema.   Skin:     General: Skin is warm and dry.      Findings: No lesion or rash.   Neurological:      General: No focal deficit present.      Mental Status: She is alert.   Psychiatric:         Mood and Affect: Mood normal.         Assessment/Plan:     1. Type 2 diabetes mellitus with hyperglycemia, without long-term current use of insulin  -Advised patient to hold her next dose of Ozempic as her current symptoms could be caused by this medication. Will make further adjustments to medications to address diabetes once her nausea and vomiting have resolved.   2. Primary hypertension  -Mildly elevated today, likely secondary to inability to keep her medications down due to vomiting as well as acute discomfort.   3. Nausea and vomiting, " unspecified vomiting type  -     Amylase; Future; Expected date: 06/25/2024  -     Lipase; Future; Expected date: 06/25/2024  -     CBC Auto Differential; Future; Expected date: 06/25/2024  -     Comprehensive Metabolic Panel; Future; Expected date: 06/25/2024  -     ondansetron (ZOFRAN-ODT) 8 MG TbDL; Take 1 tablet (8 mg total) by mouth 2 (two) times daily. for 10 days  Dispense: 20 tablet; Refill: 0    4. Fatigue, unspecified type  -suspect secondary to dehydration  5. Epigastric pain  -     Amylase; Future; Expected date: 06/25/2024  -     Lipase; Future; Expected date: 06/25/2024  -     CBC Auto Differential; Future; Expected date: 06/25/2024  -     Comprehensive Metabolic Panel; Future; Expected date: 06/25/2024  -Concern for possible pancreatitis. Will check labs and prescribe zofran for nausea. Encouraged increased oral fluid intake. If unable to maintain oral fluid intake, may need to admit to hospital for IV fluids.       Follow up:     Follow up in about 1 week (around 7/2/2024) for Follow up nausea, epigastric pain, diabetes. In addition to their scheduled follow up, the patient has also been instructed to follow up on as needed basis.

## 2024-06-28 ENCOUNTER — TELEPHONE (OUTPATIENT)
Dept: FAMILY MEDICINE | Facility: CLINIC | Age: 54
End: 2024-06-28
Payer: COMMERCIAL

## 2024-06-28 DIAGNOSIS — R10.13 EPIGASTRIC PAIN: Primary | ICD-10-CM

## 2024-06-28 RX ORDER — PANTOPRAZOLE SODIUM 40 MG/1
40 TABLET, DELAYED RELEASE ORAL DAILY
Qty: 90 TABLET | Refills: 3 | Status: SHIPPED | OUTPATIENT
Start: 2024-06-28 | End: 2025-06-28

## 2024-06-28 RX ORDER — FAMOTIDINE 20 MG/1
20 TABLET, FILM COATED ORAL 2 TIMES DAILY
Qty: 60 TABLET | Refills: 11 | Status: SHIPPED | OUTPATIENT
Start: 2024-06-28 | End: 2025-06-28

## 2024-06-28 NOTE — TELEPHONE ENCOUNTER
----- Message from Elly Barrientos sent at 6/28/2024  9:37 AM CDT -----  Regarding: not feeling better  Patient came in on Tuesday - Chief Complaint: Diabetes (3 month check up) and Fatigue (Nausea thinks it is being caused by one of her medications)  She said doc told her to let him know if she was not feeling better by today - she is still not feeling well

## 2024-06-28 NOTE — TELEPHONE ENCOUNTER
No evidence of pancreatitis on labs. Will try adding pepcid and switching omeprazole to protonix. She needs to continue oral fluids based on her kidney function.

## 2024-07-02 ENCOUNTER — CLINICAL SUPPORT (OUTPATIENT)
Dept: RESPIRATORY THERAPY | Facility: HOSPITAL | Age: 54
End: 2024-07-02
Attending: FAMILY MEDICINE
Payer: COMMERCIAL

## 2024-07-02 ENCOUNTER — OFFICE VISIT (OUTPATIENT)
Dept: FAMILY MEDICINE | Facility: CLINIC | Age: 54
End: 2024-07-02
Payer: COMMERCIAL

## 2024-07-02 ENCOUNTER — LAB VISIT (OUTPATIENT)
Dept: LAB | Facility: HOSPITAL | Age: 54
End: 2024-07-02
Attending: FAMILY MEDICINE
Payer: COMMERCIAL

## 2024-07-02 VITALS
OXYGEN SATURATION: 97 % | TEMPERATURE: 98 F | RESPIRATION RATE: 18 BRPM | BODY MASS INDEX: 41.04 KG/M2 | WEIGHT: 223 LBS | DIASTOLIC BLOOD PRESSURE: 74 MMHG | SYSTOLIC BLOOD PRESSURE: 138 MMHG | HEART RATE: 91 BPM | HEIGHT: 62 IN

## 2024-07-02 DIAGNOSIS — R07.89 ATYPICAL CHEST PAIN: ICD-10-CM

## 2024-07-02 DIAGNOSIS — R11.2 NAUSEA AND VOMITING, UNSPECIFIED VOMITING TYPE: ICD-10-CM

## 2024-07-02 DIAGNOSIS — R06.09 DYSPNEA ON EXERTION: ICD-10-CM

## 2024-07-02 DIAGNOSIS — R10.13 EPIGASTRIC PAIN: Primary | ICD-10-CM

## 2024-07-02 DIAGNOSIS — Z12.11 COLON CANCER SCREENING: ICD-10-CM

## 2024-07-02 LAB
ALBUMIN SERPL-MCNC: 3.8 G/DL (ref 3.5–5)
ALBUMIN/GLOB SERPL: 1 RATIO (ref 1.1–2)
ALP SERPL-CCNC: 136 UNIT/L (ref 40–150)
ALT SERPL-CCNC: 23 UNIT/L (ref 0–55)
ANION GAP SERPL CALC-SCNC: 8 MEQ/L
AST SERPL-CCNC: 15 UNIT/L (ref 5–34)
BASOPHILS # BLD AUTO: 0.03 X10(3)/MCL
BASOPHILS NFR BLD AUTO: 0.3 %
BILIRUB SERPL-MCNC: 1 MG/DL
BNP BLD-MCNC: 10.3 PG/ML
BUN SERPL-MCNC: 9 MG/DL (ref 9.8–20.1)
CALCIUM SERPL-MCNC: 10.1 MG/DL (ref 8.4–10.2)
CHLORIDE SERPL-SCNC: 102 MMOL/L (ref 98–107)
CO2 SERPL-SCNC: 26 MMOL/L (ref 22–29)
CREAT SERPL-MCNC: 0.77 MG/DL (ref 0.55–1.02)
CREAT/UREA NIT SERPL: 12
EOSINOPHIL # BLD AUTO: 0.26 X10(3)/MCL (ref 0–0.9)
EOSINOPHIL NFR BLD AUTO: 2.5 %
ERYTHROCYTE [DISTWIDTH] IN BLOOD BY AUTOMATED COUNT: 14.3 % (ref 11.5–17)
GFR SERPLBLD CREATININE-BSD FMLA CKD-EPI: >60 ML/MIN/1.73/M2
GLOBULIN SER-MCNC: 3.9 GM/DL (ref 2.4–3.5)
GLUCOSE SERPL-MCNC: 239 MG/DL (ref 74–100)
HCT VFR BLD AUTO: 46.8 % (ref 37–47)
HGB BLD-MCNC: 15 G/DL (ref 12–16)
IMM GRANULOCYTES # BLD AUTO: 0.03 X10(3)/MCL (ref 0–0.04)
IMM GRANULOCYTES NFR BLD AUTO: 0.3 %
LYMPHOCYTES # BLD AUTO: 2.89 X10(3)/MCL (ref 0.6–4.6)
LYMPHOCYTES NFR BLD AUTO: 27.7 %
MCH RBC QN AUTO: 26.9 PG (ref 27–31)
MCHC RBC AUTO-ENTMCNC: 32.1 G/DL (ref 33–36)
MCV RBC AUTO: 84 FL (ref 80–94)
MONOCYTES # BLD AUTO: 0.55 X10(3)/MCL (ref 0.1–1.3)
MONOCYTES NFR BLD AUTO: 5.3 %
NEUTROPHILS # BLD AUTO: 6.68 X10(3)/MCL (ref 2.1–9.2)
NEUTROPHILS NFR BLD AUTO: 63.9 %
NRBC BLD AUTO-RTO: 0 %
OHS QRS DURATION: 78 MS
OHS QTC CALCULATION: 439 MS
PLATELET # BLD AUTO: 286 X10(3)/MCL (ref 130–400)
PMV BLD AUTO: 9.6 FL (ref 7.4–10.4)
POTASSIUM SERPL-SCNC: 4.6 MMOL/L (ref 3.5–5.1)
PROT SERPL-MCNC: 7.7 GM/DL (ref 6.4–8.3)
RBC # BLD AUTO: 5.57 X10(6)/MCL (ref 4.2–5.4)
SODIUM SERPL-SCNC: 136 MMOL/L (ref 136–145)
TROPONIN I SERPL-MCNC: <0.01 NG/ML (ref 0–0.04)
WBC # BLD AUTO: 10.44 X10(3)/MCL (ref 4.5–11.5)

## 2024-07-02 PROCEDURE — 3052F HG A1C>EQUAL 8.0%<EQUAL 9.0%: CPT | Mod: CPTII,,, | Performed by: FAMILY MEDICINE

## 2024-07-02 PROCEDURE — 3061F NEG MICROALBUMINURIA REV: CPT | Mod: CPTII,,, | Performed by: FAMILY MEDICINE

## 2024-07-02 PROCEDURE — 4010F ACE/ARB THERAPY RXD/TAKEN: CPT | Mod: CPTII,,, | Performed by: FAMILY MEDICINE

## 2024-07-02 PROCEDURE — 3066F NEPHROPATHY DOC TX: CPT | Mod: CPTII,,, | Performed by: FAMILY MEDICINE

## 2024-07-02 PROCEDURE — 84484 ASSAY OF TROPONIN QUANT: CPT

## 2024-07-02 PROCEDURE — 1159F MED LIST DOCD IN RCRD: CPT | Mod: CPTII,,, | Performed by: FAMILY MEDICINE

## 2024-07-02 PROCEDURE — 3078F DIAST BP <80 MM HG: CPT | Mod: CPTII,,, | Performed by: FAMILY MEDICINE

## 2024-07-02 PROCEDURE — 93005 ELECTROCARDIOGRAM TRACING: CPT

## 2024-07-02 PROCEDURE — 1160F RVW MEDS BY RX/DR IN RCRD: CPT | Mod: CPTII,,, | Performed by: FAMILY MEDICINE

## 2024-07-02 PROCEDURE — 80053 COMPREHEN METABOLIC PANEL: CPT

## 2024-07-02 PROCEDURE — 36415 COLL VENOUS BLD VENIPUNCTURE: CPT

## 2024-07-02 PROCEDURE — 3008F BODY MASS INDEX DOCD: CPT | Mod: CPTII,,, | Performed by: FAMILY MEDICINE

## 2024-07-02 PROCEDURE — 3075F SYST BP GE 130 - 139MM HG: CPT | Mod: CPTII,,, | Performed by: FAMILY MEDICINE

## 2024-07-02 PROCEDURE — 85025 COMPLETE CBC W/AUTO DIFF WBC: CPT

## 2024-07-02 PROCEDURE — 99215 OFFICE O/P EST HI 40 MIN: CPT | Mod: ,,, | Performed by: FAMILY MEDICINE

## 2024-07-02 PROCEDURE — 83880 ASSAY OF NATRIURETIC PEPTIDE: CPT

## 2024-07-02 NOTE — PROGRESS NOTES
Patient ID: [id number]     Chief Complaint: Nausea (X1 week ), Chest Pain (Tightness x2 days - center of chest), and Abdominal Pain (X1 week)    HPI:     Helena Vazquez is a 54 y.o. female here today for Nausea (X1 week ), Chest Pain (Tightness x2 days - center of chest), and Abdominal Pain (X1 week). She has not started the pantoprazole or famotidine yet.     -------------------------------------    Asthma    Bipolar disorder, unspecified    Diabetes mellitus type 2 in obese    DUB (dysfunctional uterine bleeding)    Edema leg    Family history of breast cancer in sister    x2    Hepatic steatosis    History of infection due to human papilloma virus (HPV)    HLD (hyperlipidemia)    HTN (hypertension)    Hypercholesterolemia    Hyperlipidemia due to type 2 diabetes mellitus    Hypertriglyceridemia    WALTER (iron deficiency anemia)    Microcytic anemia    Morbid obesity    Schizoaffective disorder    Type 2 diabetes mellitus    Type 2 diabetes mellitus with hyperglycemia    Urge incontinence of urine    Vision blurred        Past Surgical History:   Procedure Laterality Date     SECTION  1994     SECTION  1991     SECTION WITH TUBAL LIGATION  1998    CHOLECYSTECTOMY  1989    HERNIA REPAIR         Review of patient's allergies indicates:  No Known Allergies    Outpatient Medications Marked as Taking for the 24 encounter (Office Visit) with Fei Smith, DO   Medication Sig Dispense Refill    albuterol-ipratropium (DUO-NEB) 2.5 mg-0.5 mg/3 mL nebulizer solution   See Instructions, INHALE 1 VIAL VIA NEBULIZER FOUR TIMES A DAY, # 90 mL, 5 Refill(s), Pharmacy: [facility], 155, cm, Height/Length Dosing, 21 7:40:00 CST, 119, kg, Weight Dosing, 21 7:40:00 CST      alcohol antiseptic pads (ALCOHOL SWABS TOP)   alcohol swabs, See Instructions, use alcohol swab on injection site prior to injection, # 1 box(es), 3 Refill(s), Pharmacy: [facility], 155, cm, Height/Length Dosing, 02/09/22 9:59:00 CST, 116.4, kg, Weight Dosing, 02/09/22 9:59...      atorvastatin (LIPITOR) 80 MG tablet Take 1 tablet (80 mg total) by mouth once daily. 90 tablet 3    famotidine (PEPCID) 20 MG tablet Take 1 tablet (20 mg total) by mouth 2 (two) times daily. 60 tablet 11    fenofibrate (TRICOR) 54 MG tablet Take 1 tablet (54 mg total) by mouth once daily. 90 tablet 3    glipiZIDE (GLUCOTROL) 10 MG tablet Take 1 tablet (10 mg total) by mouth 2 (two) times daily. 60 tablet 5    hydroCHLOROthiazide (HYDRODIURIL) 25 MG tablet Take 1 tablet (25 mg total) by mouth once daily. 30 tablet 2    JARDIANCE 25 mg tablet TAKE ONE TABLET BY MOUTH EVERY MORNING 30 tablet 3    lancets Misc   TRUEPLUS LANCETS 30G  MISC, See Instructions, USE ONCE DAILY AS DIRECTED, # 100 EA, 3 Refill(s), Pharmacy: [facility], 155, cm, Height/Length Dosing, 07/01/21 9:54:00 CDT, 121.2, kg, Weight Dosing, 07/01/21 9:54:00 CDT      losartan (COZAAR) 100 MG tablet Take 1 tablet (100 mg total) by mouth once daily. 90 tablet 3    ondansetron (ZOFRAN-ODT) 8 MG TbDL Take 1 tablet (8 mg total) by mouth 2 (two) times daily. for 10 days 20 tablet 0    ONETOUCH DELICA PLUS LANCET 33 gauge Misc USE ONCE DAILY AS DIRECTED 100 each 3    ONETOUCH ULTRA TEST Strp USE TO TEST BLOOD SUGAR ONCE DAILY 50 strip 5    oxybutynin (DITROPAN) 5 MG Tab Take 1 tablet (5 mg total) by mouth 3 (three) times daily. 90 tablet 3    pantoprazole (PROTONIX) 40 MG tablet Take 1 tablet (40 mg total) by mouth once daily. 90 tablet 3    pioglitazone (ACTOS) 30 MG tablet Take 1 tablet (30 mg total) by mouth once daily. 30 tablet 5    traZODone (DESYREL) 100 MG tablet Take 2 tablets (200 mg total) by mouth nightly as needed for Insomnia. 60 tablet 5    TRUEPLUS LANCETS 30 gauge Misc USE ONCE DAILY AS DIRECTED 100 each 3       Social History     Socioeconomic History    Marital status:    Tobacco Use     Smoking status: Former    Smokeless tobacco: Never   Substance and Sexual Activity    Alcohol use: Never    Drug use: Never    Sexual activity: Yes     Birth control/protection: Surgical     Social Determinants of Health     Financial Resource Strain: Low Risk  (6/14/2023)    Overall Financial Resource Strain (CARDIA)     Difficulty of Paying Living Expenses: Not hard at all   Food Insecurity: No Food Insecurity (6/14/2023)    Hunger Vital Sign     Worried About Running Out of Food in the Last Year: Never true     Ran Out of Food in the Last Year: Never true   Transportation Needs: No Transportation Needs (6/14/2023)    PRAPARE - Transportation     Lack of Transportation (Medical): No     Lack of Transportation (Non-Medical): No   Physical Activity: Insufficiently Active (6/14/2023)    Exercise Vital Sign     Days of Exercise per Week: 5 days     Minutes of Exercise per Session: 20 min   Stress: Stress Concern Present (6/14/2023)    [employer] - Occupational Stress Questionnaire     Feeling of Stress : To some extent   Housing Stability: Low Risk  (6/14/2023)    Housing Stability Vital Sign     Unable to Pay for Housing in the Last Year: No     Number of Places Lived in the Last Year: 1     Unstable Housing in the Last Year: No        Family History   Problem Relation Name Age of Onset    Heart attack Mother      Diabetes Mellitus Mother      Coronary artery disease Mother      Hypertension Mother      Lung cancer Father      Diabetes Mellitus Father      Breast cancer Sister      Breast cancer Sister          Patient Care Team:  Fei Smith DO as PCP - General (Family Medicine)  Care, Jorge L Eye as Consulting Physician (Optometry)  Santo Barrientos NP as Consulting Physician (Psychology)  Karlie Cardozo NP as Consulting Physician (Psychology)  Shelley Connor MA Karr, Jacob R, MD as Consulting Physician (Gastroenterology)     Subjective:     Review of Systems  "  Constitutional:  Negative for chills and fever.   Respiratory:  Positive for shortness of breath.    Cardiovascular:  Positive for chest pain.   Gastrointestinal:  Positive for abdominal pain, nausea and vomiting. Negative for constipation and diarrhea.   Neurological:  Negative for headaches.   Psychiatric/Behavioral:  The patient does not have insomnia.        See HPI for details  All Other ROS: Negative except as stated in HPI.       Objective:     /74 (BP Location: Left arm, Patient Position: Sitting, BP Method: Large (Manual))   Pulse 91   Temp 98.1 °F (36.7 °C)   Resp 18   Ht 5' 2" (1.575 m)   Wt 101.2 kg (223 lb)   SpO2 97%   BMI 40.79 kg/m²     Physical Exam  Vitals reviewed.   Constitutional:       General: She is not in acute distress.     Appearance: Normal appearance.   Cardiovascular:      Rate and Rhythm: Normal rate and regular rhythm.      Heart sounds: No murmur heard.     No friction rub. No gallop.   Pulmonary:      Effort: No respiratory distress.      Breath sounds: No wheezing, rhonchi or rales.   Musculoskeletal:         General: No swelling, tenderness or deformity.      Right lower leg: No edema.      Left lower leg: No edema.   Skin:     General: Skin is warm and dry.      Findings: No lesion or rash.   Neurological:      General: No focal deficit present.      Mental Status: She is alert.   Psychiatric:         Mood and Affect: Mood normal.         Assessment/Plan:     1. Epigastric pain  -Suspect hiatal hernia but will rule out atypical MI and other Cardiac etiology first due to patient now reporting dyspnea on exertion.   2. Colon cancer screening  -     Cologuard Screening (Multitarget Stool DNA); Future; Expected date: 07/02/2024    3. Nausea and vomiting, unspecified vomiting type  -Continue zofran PRN. Start the pantoprazole and famotidine as prescribed.   4. Dyspnea on exertion  -     EKG 12-lead; Future; Expected date: 07/02/2024  -     Troponin I; Future; Expected " date: 07/02/2024  -     Echo; Future  -     CBC Auto Differential; Future; Expected date: 07/02/2024  -     Comprehensive Metabolic Panel; Future; Expected date: 07/02/2024  -     BNP; Future; Expected date: 07/02/2024    5. Atypical chest pain  -     EKG 12-lead; Future; Expected date: 07/02/2024  -     Troponin I; Future; Expected date: 07/02/2024  -     Echo; Future  -     CBC Auto Differential; Future; Expected date: 07/02/2024  -     Comprehensive Metabolic Panel; Future; Expected date: 07/02/2024  -     BNP; Future; Expected date: 07/02/2024  Suspicion is low for atypical MI but it is still on my differential given the new onset Dyspnea on exertion and chest tightness. If any of the above evaluation is concerning for abnormalities, I will try to set up with Cardiology ASAP. Discussed ER precautions with patient.         Follow up:     Follow up in about 2 weeks (around 7/16/2024) for Follow up with Dr. MELANIE Greco In addition to their scheduled follow up, the patient has also been instructed to follow up on as needed basis.

## 2024-07-16 ENCOUNTER — OFFICE VISIT (OUTPATIENT)
Dept: FAMILY MEDICINE | Facility: CLINIC | Age: 54
End: 2024-07-16
Payer: COMMERCIAL

## 2024-07-16 VITALS
HEART RATE: 101 BPM | WEIGHT: 221 LBS | TEMPERATURE: 98 F | OXYGEN SATURATION: 99 % | RESPIRATION RATE: 20 BRPM | BODY MASS INDEX: 40.67 KG/M2 | HEIGHT: 62 IN | DIASTOLIC BLOOD PRESSURE: 86 MMHG | SYSTOLIC BLOOD PRESSURE: 148 MMHG

## 2024-07-16 DIAGNOSIS — R10.13 EPIGASTRIC PAIN: Primary | ICD-10-CM

## 2024-07-16 DIAGNOSIS — R21 RASH: ICD-10-CM

## 2024-07-16 DIAGNOSIS — E11.65 TYPE 2 DIABETES MELLITUS WITH HYPERGLYCEMIA, WITHOUT LONG-TERM CURRENT USE OF INSULIN: Chronic | ICD-10-CM

## 2024-07-16 PROCEDURE — 4010F ACE/ARB THERAPY RXD/TAKEN: CPT | Mod: CPTII,,, | Performed by: FAMILY MEDICINE

## 2024-07-16 PROCEDURE — 1159F MED LIST DOCD IN RCRD: CPT | Mod: CPTII,,, | Performed by: FAMILY MEDICINE

## 2024-07-16 PROCEDURE — 3052F HG A1C>EQUAL 8.0%<EQUAL 9.0%: CPT | Mod: CPTII,,, | Performed by: FAMILY MEDICINE

## 2024-07-16 PROCEDURE — 3008F BODY MASS INDEX DOCD: CPT | Mod: CPTII,,, | Performed by: FAMILY MEDICINE

## 2024-07-16 PROCEDURE — 1160F RVW MEDS BY RX/DR IN RCRD: CPT | Mod: CPTII,,, | Performed by: FAMILY MEDICINE

## 2024-07-16 PROCEDURE — 3077F SYST BP >= 140 MM HG: CPT | Mod: CPTII,,, | Performed by: FAMILY MEDICINE

## 2024-07-16 PROCEDURE — 3066F NEPHROPATHY DOC TX: CPT | Mod: CPTII,,, | Performed by: FAMILY MEDICINE

## 2024-07-16 PROCEDURE — 3079F DIAST BP 80-89 MM HG: CPT | Mod: CPTII,,, | Performed by: FAMILY MEDICINE

## 2024-07-16 PROCEDURE — 3061F NEG MICROALBUMINURIA REV: CPT | Mod: CPTII,,, | Performed by: FAMILY MEDICINE

## 2024-07-16 PROCEDURE — 99214 OFFICE O/P EST MOD 30 MIN: CPT | Mod: ,,, | Performed by: FAMILY MEDICINE

## 2024-07-16 RX ORDER — TRIAMCINOLONE ACETONIDE 1 MG/G
CREAM TOPICAL 2 TIMES DAILY
Qty: 28.4 G | Refills: 0 | Status: SHIPPED | OUTPATIENT
Start: 2024-07-16 | End: 2024-07-30

## 2024-07-16 NOTE — PROGRESS NOTES
Patient ID: [id number]     Chief Complaint: Follow-up (2 week epigastric pain follow up)    HPI:     Helena Vazquez is a 54 y.o. female here today for Follow-up (2 week epigastric pain follow up). She reports her symptoms have improved somewhat in that they are occurring less frequently. Cardiac etiology ruled out. Patient started pantoprazole and famotidine since last visit.   -------------------------------------    Asthma    Bipolar disorder, unspecified    Diabetes mellitus type 2 in obese    DUB (dysfunctional uterine bleeding)    Edema leg    Family history of breast cancer in sister    x2    Hepatic steatosis    History of infection due to human papilloma virus (HPV)    HLD (hyperlipidemia)    HTN (hypertension)    Hypercholesterolemia    Hyperlipidemia due to type 2 diabetes mellitus    Hypertriglyceridemia    WALTER (iron deficiency anemia)    Microcytic anemia    Morbid obesity    Schizoaffective disorder    Type 2 diabetes mellitus    Type 2 diabetes mellitus with hyperglycemia    Urge incontinence of urine    Vision blurred        Past Surgical History:   Procedure Laterality Date     SECTION  1994     SECTION  1991     SECTION       SECTION WITH TUBAL LIGATION  1998    CHOLECYSTECTOMY  1989    HERNIA REPAIR      TUBAL LIGATION         Review of patient's allergies indicates:  No Known Allergies    Outpatient Medications Marked as Taking for the 24 encounter (Office Visit) with Fei Smith, DO   Medication Sig Dispense Refill    albuterol-ipratropium (DUO-NEB) 2.5 mg-0.5 mg/3 mL nebulizer solution   See Instructions, INHALE 1 VIAL VIA NEBULIZER FOUR TIMES A DAY, # 90 mL, 5 Refill(s), Pharmacy: [facility], 155, cm, Height/Length Dosing, 21 7:40:00 CST, 119, kg, Weight Dosing, 21 7:40:00 CST      alcohol antiseptic pads (ALCOHOL SWABS TOP)   alcohol swabs, See Instructions, use alcohol swab on injection site  prior to injection, # 1 box(es), 3 Refill(s), Pharmacy: [facility], 155, cm, Height/Length Dosing, 02/09/22 9:59:00 CST, 116.4, kg, Weight Dosing, 02/09/22 9:59...      atorvastatin (LIPITOR) 80 MG tablet Take 1 tablet (80 mg total) by mouth once daily. 90 tablet 3    famotidine (PEPCID) 20 MG tablet Take 1 tablet (20 mg total) by mouth 2 (two) times daily. 60 tablet 11    fenofibrate (TRICOR) 54 MG tablet Take 1 tablet (54 mg total) by mouth once daily. 90 tablet 3    glipiZIDE (GLUCOTROL) 10 MG tablet Take 1 tablet (10 mg total) by mouth 2 (two) times daily. 60 tablet 5    hydroCHLOROthiazide (HYDRODIURIL) 25 MG tablet Take 1 tablet (25 mg total) by mouth once daily. 30 tablet 2    JARDIANCE 25 mg tablet TAKE ONE TABLET BY MOUTH EVERY MORNING 30 tablet 3    lancets Misc   TRUEPLUS LANCETS 30G  MISC, See Instructions, USE ONCE DAILY AS DIRECTED, # 100 EA, 3 Refill(s), Pharmacy: [facility], 155, cm, Height/Length Dosing, 07/01/21 9:54:00 CDT, 121.2, kg, Weight Dosing, 07/01/21 9:54:00 CDT      losartan (COZAAR) 100 MG tablet Take 1 tablet (100 mg total) by mouth once daily. 90 tablet 3    ONETOUCH DELICA PLUS LANCET 33 gauge Misc USE ONCE DAILY AS DIRECTED 100 each 3    ONETOUCH ULTRA TEST Strp USE TO TEST BLOOD SUGAR ONCE DAILY 50 strip 5    oxybutynin (DITROPAN) 5 MG Tab Take 1 tablet (5 mg total) by mouth 3 (three) times daily. 90 tablet 3    pantoprazole (PROTONIX) 40 MG tablet Take 1 tablet (40 mg total) by mouth once daily. 90 tablet 3    pioglitazone (ACTOS) 30 MG tablet Take 1 tablet (30 mg total) by mouth once daily. 30 tablet 5    traZODone (DESYREL) 100 MG tablet Take 2 tablets (200 mg total) by mouth nightly as needed for Insomnia. 60 tablet 5    TRUEPLUS LANCETS 30 gauge Misc USE ONCE DAILY AS DIRECTED 100 each 3       Social History     Socioeconomic History    Marital status:    Tobacco Use    Smoking status: Former    Smokeless tobacco: Never    Substance and Sexual Activity    Alcohol use: Never    Drug use: Never    Sexual activity: Yes     Partners: Male     Birth control/protection: None     Social Determinants of Health     Financial Resource Strain: High Risk (7/15/2024)    Overall Financial Resource Strain (CARDIA)     Difficulty of Paying Living Expenses: Hard   Food Insecurity: No Food Insecurity (7/15/2024)    Hunger Vital Sign     Worried About Running Out of Food in the Last Year: Never true     Ran Out of Food in the Last Year: Never true   Transportation Needs: No Transportation Needs (6/14/2023)    PRAPARE - Transportation     Lack of Transportation (Medical): No     Lack of Transportation (Non-Medical): No   Physical Activity: Unknown (7/15/2024)    Exercise Vital Sign     Days of Exercise per Week: 0 days   Stress: No Stress Concern Present (7/15/2024)    [employer] - Occupational Stress Questionnaire     Feeling of Stress : Not at all   Housing Stability: Low Risk  (6/14/2023)    Housing Stability Vital Sign     Unable to Pay for Housing in the Last Year: No     Number of Places Lived in the Last Year: 1     Unstable Housing in the Last Year: No        Family History   Problem Relation Name Age of Onset    Heart attack Mother Shauna stephanie     Diabetes Mellitus Mother Shauna stephanie     Coronary artery disease Mother Shauna stephanie     Hypertension Mother Shauna stephanie     Asthma Mother Shauna stephanie     Depression Mother Shauna stephanie     Learning disabilities Mother Shauna stephanie     Lung cancer Father Jayson champagne     Diabetes Mellitus Father Jayson champagne     Diabetes Father Jayson champagne     Breast cancer Sister      Breast cancer Sister          Patient Care Team:  Fei Smith DO as PCP - General (Family Medicine)  Care, Jorge L Eye as Consulting Physician (Optometry)  Santo Barrientos NP as Consulting Physician (Psychology)  Karlie Cardozo NP as Consulting Physician  "(Psychology)  Shelley Connor MA Karr, Jacob R, MD as Consulting Physician (Gastroenterology)     Subjective:     Review of Systems   Constitutional:  Negative for chills and fever.   Respiratory:  Negative for shortness of breath.    Cardiovascular:  Negative for chest pain.   Gastrointestinal:  Negative for constipation and diarrhea.   Neurological:  Negative for dizziness and headaches.   Psychiatric/Behavioral:  The patient does not have insomnia.        See HPI for details  All Other ROS: Negative except as stated in HPI.       Objective:     BP (!) 148/86   Pulse 101   Temp 98.3 °F (36.8 °C)   Resp 20   Ht 5' 2" (1.575 m)   Wt 100.2 kg (221 lb)   SpO2 99%   BMI 40.42 kg/m²     Physical Exam  Vitals reviewed.   Constitutional:       General: She is not in acute distress.     Appearance: Normal appearance.   Cardiovascular:      Rate and Rhythm: Normal rate and regular rhythm.      Heart sounds: No murmur heard.     No friction rub. No gallop.   Pulmonary:      Effort: No respiratory distress.      Breath sounds: No wheezing, rhonchi or rales.   Musculoskeletal:         General: No swelling, tenderness or deformity.      Right lower leg: No edema.      Left lower leg: No edema.   Skin:     General: Skin is warm and dry.      Findings: No lesion or rash.   Neurological:      General: No focal deficit present.      Mental Status: She is alert.   Psychiatric:         Mood and Affect: Mood normal.         Assessment/Plan:     1. Epigastric pain  -encouraged patient to keep upcoming GI appointments. Continue pepcid 20mg BID, pantoprazole 40mg daily.   2. Rash  -     triamcinolone acetonide 0.1% (KENALOG) 0.1 % cream; Apply topically 2 (two) times daily. for 14 days  Dispense: 28.4 g; Refill: 0      Follow up:     Follow up in about 4 weeks (around 8/13/2024). In addition to their scheduled follow up, the patient has also been instructed to follow up on as needed basis.         "

## 2024-07-17 ENCOUNTER — PATIENT MESSAGE (OUTPATIENT)
Facility: CLINIC | Age: 54
End: 2024-07-17
Payer: COMMERCIAL

## 2024-07-17 RX ORDER — SEMAGLUTIDE 0.68 MG/ML
INJECTION, SOLUTION SUBCUTANEOUS
Qty: 3 ML | Refills: 2 | Status: SHIPPED | OUTPATIENT
Start: 2024-07-17

## 2024-07-31 DIAGNOSIS — E11.65 TYPE 2 DIABETES MELLITUS WITH HYPERGLYCEMIA, WITHOUT LONG-TERM CURRENT USE OF INSULIN: ICD-10-CM

## 2024-07-31 DIAGNOSIS — I10 PRIMARY HYPERTENSION: ICD-10-CM

## 2024-07-31 RX ORDER — HYDROCHLOROTHIAZIDE 25 MG/1
25 TABLET ORAL
Qty: 30 TABLET | Refills: 2 | Status: SHIPPED | OUTPATIENT
Start: 2024-07-31

## 2024-07-31 RX ORDER — EMPAGLIFLOZIN 25 MG/1
TABLET, FILM COATED ORAL
Qty: 30 TABLET | Refills: 5 | Status: SHIPPED | OUTPATIENT
Start: 2024-07-31

## 2024-08-13 ENCOUNTER — OFFICE VISIT (OUTPATIENT)
Dept: FAMILY MEDICINE | Facility: CLINIC | Age: 54
End: 2024-08-13
Payer: COMMERCIAL

## 2024-08-13 VITALS
HEART RATE: 78 BPM | DIASTOLIC BLOOD PRESSURE: 76 MMHG | TEMPERATURE: 97 F | SYSTOLIC BLOOD PRESSURE: 132 MMHG | HEIGHT: 62 IN | OXYGEN SATURATION: 98 % | BODY MASS INDEX: 40.85 KG/M2 | RESPIRATION RATE: 18 BRPM | WEIGHT: 222 LBS

## 2024-08-13 DIAGNOSIS — R10.13 EPIGASTRIC PAIN: ICD-10-CM

## 2024-08-13 DIAGNOSIS — E11.65 TYPE 2 DIABETES MELLITUS WITH HYPERGLYCEMIA, WITHOUT LONG-TERM CURRENT USE OF INSULIN: Primary | Chronic | ICD-10-CM

## 2024-08-13 PROCEDURE — 99214 OFFICE O/P EST MOD 30 MIN: CPT | Mod: ,,, | Performed by: FAMILY MEDICINE

## 2024-08-13 PROCEDURE — 3078F DIAST BP <80 MM HG: CPT | Mod: CPTII,,, | Performed by: FAMILY MEDICINE

## 2024-08-13 PROCEDURE — 1159F MED LIST DOCD IN RCRD: CPT | Mod: CPTII,,, | Performed by: FAMILY MEDICINE

## 2024-08-13 PROCEDURE — 3066F NEPHROPATHY DOC TX: CPT | Mod: CPTII,,, | Performed by: FAMILY MEDICINE

## 2024-08-13 PROCEDURE — 3061F NEG MICROALBUMINURIA REV: CPT | Mod: CPTII,,, | Performed by: FAMILY MEDICINE

## 2024-08-13 PROCEDURE — 3075F SYST BP GE 130 - 139MM HG: CPT | Mod: CPTII,,, | Performed by: FAMILY MEDICINE

## 2024-08-13 PROCEDURE — 1160F RVW MEDS BY RX/DR IN RCRD: CPT | Mod: CPTII,,, | Performed by: FAMILY MEDICINE

## 2024-08-13 PROCEDURE — 3008F BODY MASS INDEX DOCD: CPT | Mod: CPTII,,, | Performed by: FAMILY MEDICINE

## 2024-08-13 PROCEDURE — 4010F ACE/ARB THERAPY RXD/TAKEN: CPT | Mod: CPTII,,, | Performed by: FAMILY MEDICINE

## 2024-08-13 PROCEDURE — 3052F HG A1C>EQUAL 8.0%<EQUAL 9.0%: CPT | Mod: CPTII,,, | Performed by: FAMILY MEDICINE

## 2024-08-13 NOTE — PROGRESS NOTES
Patient ID: [id number]     Chief Complaint: 4 week f/u    HPI:     Helena Vazquez is a 54 y.o. female here today for 4 week f/u for abdominal pain. The patient has made no effort to try to identify specific triggers. She states eating makes the pain worse, particularly in the epigastrum. This has occurred both on and off of the Actos and Ozempic.     Diabetes poorly controlled with the patient reporting recent glucose readings ranging from the 200s to the 500s.     -------------------------------------    Asthma    Bipolar disorder, unspecified    Diabetes mellitus type 2 in obese    DUB (dysfunctional uterine bleeding)    Edema leg    Family history of breast cancer in sister    x2    Hepatic steatosis    History of infection due to human papilloma virus (HPV)    HLD (hyperlipidemia)    HTN (hypertension)    Hypercholesterolemia    Hyperlipidemia due to type 2 diabetes mellitus    Hypertriglyceridemia    WALTER (iron deficiency anemia)    Microcytic anemia    Morbid obesity    Schizoaffective disorder    Type 2 diabetes mellitus    Type 2 diabetes mellitus with hyperglycemia    Urge incontinence of urine    Vision blurred        Past Surgical History:   Procedure Laterality Date     SECTION  1994     SECTION  1991     SECTION       SECTION WITH TUBAL LIGATION  1998    CHOLECYSTECTOMY  1989    HERNIA REPAIR      TUBAL LIGATION         Review of patient's allergies indicates:  No Known Allergies    Outpatient Medications Marked as Taking for the 24 encounter (Office Visit) with Fei Smith, DO   Medication Sig Dispense Refill    albuterol-ipratropium (DUO-NEB) 2.5 mg-0.5 mg/3 mL nebulizer solution   See Instructions, INHALE 1 VIAL VIA NEBULIZER FOUR TIMES A DAY, # 90 mL, 5 Refill(s), Pharmacy: [facility], 155, cm, Height/Length Dosing, 21 7:40:00 CST, 119, kg, Weight Dosing, 21 7:40:00 CST      alcohol antiseptic pads  (ALCOHOL SWABS TOP)   alcohol swabs, See Instructions, use alcohol swab on injection site prior to injection, # 1 box(es), 3 Refill(s), Pharmacy: [facility], 155, cm, Height/Length Dosing, 02/09/22 9:59:00 CST, 116.4, kg, Weight Dosing, 02/09/22 9:59...      atorvastatin (LIPITOR) 80 MG tablet Take 1 tablet (80 mg total) by mouth once daily. 90 tablet 3    famotidine (PEPCID) 20 MG tablet Take 1 tablet (20 mg total) by mouth 2 (two) times daily. 60 tablet 11    fenofibrate (TRICOR) 54 MG tablet Take 1 tablet (54 mg total) by mouth once daily. 90 tablet 3    glipiZIDE (GLUCOTROL) 10 MG tablet Take 1 tablet (10 mg total) by mouth 2 (two) times daily. 60 tablet 5    hydroCHLOROthiazide (HYDRODIURIL) 25 MG tablet TAKE ONE TABLET BY MOUTH ONCE DAILY 30 tablet 2    JARDIANCE 25 mg tablet TAKE ONE TABLET BY MOUTH EVERY MORNING 30 tablet 5    lancets Misc   TRUEPLUS LANCETS 30G  MISC, See Instructions, USE ONCE DAILY AS DIRECTED, # 100 EA, 3 Refill(s), Pharmacy: [facility], 155, cm, Height/Length Dosing, 07/01/21 9:54:00 CDT, 121.2, kg, Weight Dosing, 07/01/21 9:54:00 CDT      losartan (COZAAR) 100 MG tablet Take 1 tablet (100 mg total) by mouth once daily. 90 tablet 3    ONETOUCH DELICA PLUS LANCET 33 gauge Misc USE ONCE DAILY AS DIRECTED 100 each 3    ONETOUCH ULTRA TEST Strp USE TO TEST BLOOD SUGAR ONCE DAILY 50 strip 5    oxybutynin (DITROPAN) 5 MG Tab Take 1 tablet (5 mg total) by mouth 3 (three) times daily. 90 tablet 3    OZEMPIC 0.25 mg or 0.5 mg (2 mg/3 mL) pen injector INJECT 0.5MG UNDER THE SKIN EVERY 7 DAYS 3 mL 2    pantoprazole (PROTONIX) 40 MG tablet Take 1 tablet (40 mg total) by mouth once daily. 90 tablet 3    pioglitazone (ACTOS) 30 MG tablet Take 1 tablet (30 mg total) by mouth once daily. 30 tablet 5    traZODone (DESYREL) 100 MG tablet Take 2 tablets (200 mg total) by mouth nightly as needed for Insomnia. 60 tablet 5    TRUEPLUS LANCETS 30 gauge Misc USE  ONCE DAILY AS DIRECTED 100 each 3       Social History     Socioeconomic History    Marital status:    Tobacco Use    Smoking status: Former    Smokeless tobacco: Never   Substance and Sexual Activity    Alcohol use: Never    Drug use: Never    Sexual activity: Yes     Partners: Male     Birth control/protection: None     Social Determinants of Health     Financial Resource Strain: High Risk (7/15/2024)    Overall Financial Resource Strain (CARDIA)     Difficulty of Paying Living Expenses: Hard   Food Insecurity: No Food Insecurity (7/15/2024)    Hunger Vital Sign     Worried About Running Out of Food in the Last Year: Never true     Ran Out of Food in the Last Year: Never true   Transportation Needs: No Transportation Needs (6/14/2023)    PRAPARE - Transportation     Lack of Transportation (Medical): No     Lack of Transportation (Non-Medical): No   Physical Activity: Unknown (7/15/2024)    Exercise Vital Sign     Days of Exercise per Week: 0 days   Stress: No Stress Concern Present (7/15/2024)    [employer] - Occupational Stress Questionnaire     Feeling of Stress : Not at all   Housing Stability: Low Risk  (6/14/2023)    Housing Stability Vital Sign     Unable to Pay for Housing in the Last Year: No     Number of Places Lived in the Last Year: 1     Unstable Housing in the Last Year: No        Family History   Problem Relation Name Age of Onset    Heart attack Mother Shauna stephanie     Diabetes Mellitus Mother Shauna stephanie     Coronary artery disease Mother Shauna stephanie     Hypertension Mother Shauna stephanie     Asthma Mother Shauna stephanie     Depression Mother Shauna stephanie     Learning disabilities Mother Shauna stephanie     Lung cancer Father Jayson champagne     Diabetes Mellitus Father Jayson champagne     Diabetes Father Jayson champagne     Breast cancer Sister      Breast cancer Sister          Patient Care Team:  Fei Smith DO as PCP - General (Family  "Medicine)  Care, Jorge L Vega as Consulting Physician (Optometry)  Santo Barrientos NP as Consulting Physician (Psychology)  Karlie Cardozo NP as Consulting Physician (Psychology)  Shelley Connor MA Karr, Jacob R, MD as Consulting Physician (Gastroenterology)     Subjective:     Review of Systems   Constitutional:  Negative for chills and fever.   Respiratory:  Negative for shortness of breath.    Cardiovascular:  Negative for chest pain.   Gastrointestinal:  Positive for abdominal pain. Negative for constipation, diarrhea, nausea and vomiting.   Neurological:  Negative for dizziness and headaches.   Psychiatric/Behavioral:  The patient does not have insomnia.        See HPI for details  All Other ROS: Negative except as stated in HPI.       Objective:     /76 (BP Location: Left arm, Patient Position: Sitting, BP Method: Large (Manual))   Pulse 78   Temp 97.2 °F (36.2 °C)   Resp 18   Ht 5' 2" (1.575 m)   Wt 100.7 kg (222 lb)   SpO2 98%   BMI 40.60 kg/m²     Physical Exam  Vitals reviewed.   Constitutional:       General: She is not in acute distress.     Appearance: Normal appearance. She is obese.   Cardiovascular:      Rate and Rhythm: Normal rate and regular rhythm.      Heart sounds: No murmur heard.     No friction rub. No gallop.   Pulmonary:      Effort: No respiratory distress.      Breath sounds: No wheezing, rhonchi or rales.   Musculoskeletal:         General: No swelling, tenderness or deformity.      Right lower leg: No edema.      Left lower leg: No edema.   Skin:     General: Skin is warm and dry.      Findings: No lesion or rash.   Neurological:      General: No focal deficit present.      Mental Status: She is alert.   Psychiatric:         Mood and Affect: Mood normal.         Assessment/Plan:     1. Type 2 diabetes mellitus with hyperglycemia, without long-term current use of insulin  -     Ambulatory referral/consult to Diabetes Education; Future; Expected date: " 08/20/2024  -     Comprehensive Metabolic Panel; Future; Expected date: 08/13/2024  -     Hemoglobin A1C; Future; Expected date: 08/13/2024    2. Epigastric pain  -     Ambulatory referral/consult to General Surgery; Future; Expected date: 08/13/2024          Follow up:     Follow up in about 4 weeks (around 9/10/2024) for Follow up diabetes and epigastric pain follow up . In addition to their scheduled follow up, the patient has also been instructed to follow up on as needed basis.

## 2024-08-28 PROBLEM — G89.29 CHRONIC EPIGASTRIC PAIN: Status: ACTIVE | Noted: 2024-08-28

## 2024-08-28 PROBLEM — Z12.11 SCREENING FOR MALIGNANT NEOPLASM OF COLON: Status: ACTIVE | Noted: 2024-08-28

## 2024-08-28 PROBLEM — R10.13 CHRONIC EPIGASTRIC PAIN: Status: ACTIVE | Noted: 2024-08-28

## 2024-08-28 NOTE — H&P (VIEW-ONLY)
History & Physical    Subjective     History of Present Illness:  Patient is a 54 y.o. female presents with complaint of epigastric pain and discomfort ongoing for the last 2-3 months.  She has undergone a workup without identified underlying causes.  She has not undergone endoscopy in the past.  He currently denies difficulty with swallowing.  States that the discomfort is present throughout the day and night despite ingestion of food.  She admits to previous history of excessive acidic foods and drinks including cold drinks however denies alcohol or NSAID use.  She has no other changes in bowel function and frequency.  She has undergone cholecystectomy in the past.  We have discussed undergoing diagnostic EGD for further evaluation.  Consent for EGD has been obtained after risks benefits and alternatives to procedure explained all questions answered    Chief Complaint   Patient presents with    Surgical Consult     Dr Smith referral, epigastric pain       Review of patient's allergies indicates:  No Known Allergies    Current Outpatient Medications   Medication Sig Dispense Refill    albuterol-ipratropium (DUO-NEB) 2.5 mg-0.5 mg/3 mL nebulizer solution   See Instructions, INHALE 1 VIAL VIA NEBULIZER FOUR TIMES A DAY, # 90 mL, 5 Refill(s), Pharmacy: [facility], 155, cm, Height/Length Dosing, 12/21/21 7:40:00 CST, 119, kg, Weight Dosing, 12/21/21 7:40:00 CST      alcohol antiseptic pads (ALCOHOL SWABS TOP)   alcohol swabs, See Instructions, use alcohol swab on injection site prior to injection, # 1 box(es), 3 Refill(s), Pharmacy: [place], 155, cm, Height/Length Dosing, 02/09/22 9:59:00 CST, 116.4, kg, Weight Dosing, 02/09/22 9:59...      atorvastatin (LIPITOR) 80 MG tablet Take 1 tablet (80 mg total) by mouth once daily. 90 tablet 3    famotidine (PEPCID) 20 MG tablet Take 1 tablet (20 mg total) by mouth 2 (two) times daily. 60 tablet 11    fenofibrate (TRICOR)  54 MG tablet Take 1 tablet (54 mg total) by mouth once daily. 90 tablet 3    glipiZIDE (GLUCOTROL) 10 MG tablet Take 1 tablet (10 mg total) by mouth 2 (two) times daily. 60 tablet 5    hydroCHLOROthiazide (HYDRODIURIL) 25 MG tablet TAKE ONE TABLET BY MOUTH ONCE DAILY 30 tablet 2    JARDIANCE 25 mg tablet TAKE ONE TABLET BY MOUTH EVERY MORNING 30 tablet 5    lancets Misc   TRUEPLUS LANCETS 30G  MISC, See Instructions, USE ONCE DAILY AS DIRECTED, # 100 EA, 3 Refill(s), Pharmacy: [facility], 155, cm, Height/Length Dosing, 07/01/21 9:54:00 CDT, 121.2, kg, Weight Dosing, 07/01/21 9:54:00 CDT      losartan (COZAAR) 100 MG tablet Take 1 tablet (100 mg total) by mouth once daily. 90 tablet 3    ONETOUCH DELICA PLUS LANCET 33 gauge Misc USE ONCE DAILY AS DIRECTED 100 each 3    ONETOUCH ULTRA TEST Strp USE TO TEST BLOOD SUGAR ONCE DAILY 50 strip 5    oxybutynin (DITROPAN) 5 MG Tab Take 1 tablet (5 mg total) by mouth 3 (three) times daily. 90 tablet 3    OZEMPIC 0.25 mg or 0.5 mg (2 mg/3 mL) pen injector INJECT 0.5MG UNDER THE SKIN EVERY 7 DAYS 3 mL 2    pantoprazole (PROTONIX) 40 MG tablet Take 1 tablet (40 mg total) by mouth once daily. 90 tablet 3    pioglitazone (ACTOS) 30 MG tablet Take 1 tablet (30 mg total) by mouth once daily. 30 tablet 5    traZODone (DESYREL) 100 MG tablet Take 2 tablets (200 mg total) by mouth nightly as needed for Insomnia. 60 tablet 5    TRUEPLUS LANCETS 30 gauge Misc USE ONCE DAILY AS DIRECTED 100 each 3    triamcinolone acetonide 0.1% (KENALOG) 0.1 % cream Apply topically 2 (two) times daily. for 14 days 28.4 g 0     No current facility-administered medications for this visit.       Past Medical History:   Diagnosis Date    Asthma     Bipolar disorder, unspecified     Diabetes mellitus type 2 in obese     DUB (dysfunctional uterine bleeding)     Edema leg     Family history of breast cancer in sister     x2    Hepatic steatosis     History of infection due to human  "papilloma virus (HPV)     HLD (hyperlipidemia)     HTN (hypertension)     Hypercholesterolemia     Hyperlipidemia due to type 2 diabetes mellitus 2022    Hypertriglyceridemia     WALTER (iron deficiency anemia)     Microcytic anemia     Morbid obesity     Schizoaffective disorder     Type 2 diabetes mellitus     Type 2 diabetes mellitus with hyperglycemia     Urge incontinence of urine     Vision blurred      Past Surgical History:   Procedure Laterality Date     SECTION  1994     SECTION  1991     SECTION       SECTION WITH TUBAL LIGATION  1998    CHOLECYSTECTOMY  1989    HERNIA REPAIR  1999    TUBAL LIGATION       Family History   Problem Relation Name Age of Onset    Heart attack Mother Shauna stephanie     Diabetes Mellitus Mother Shauna stephanie     Coronary artery disease Mother Shauna stephanie     Hypertension Mother Shauna stephanie     Asthma Mother Shauna stephanie     Depression Mother Shauna stephanie     Learning disabilities Mother Shauna stephanie     Lung cancer Father Jayson champagne     Diabetes Mellitus Father Jayson champagne     Diabetes Father Jayson champagne     Breast cancer Sister      Breast cancer Sister       Social History     Tobacco Use    Smoking status: Former    Smokeless tobacco: Never   Substance Use Topics    Alcohol use: Never    Drug use: Never        Review of Systems:  Review of Systems   All other systems reviewed and are negative.         Objective     Vital Signs (Most Recent)  Temp: 97.2 °F (36.2 °C) (24 1036)  Pulse: 88 (24 1036)  Resp: 16 (24 1036)  BP: (!) 146/88 (24 1036)  SpO2: 98 % (24 1036)  5' 2" (1.575 m)  100.7 kg (222 lb 0.1 oz)     Physical Exam:  Physical Exam  Vitals reviewed.   Constitutional:       Appearance: Normal appearance.   HENT:      Head: Normocephalic and atraumatic.      Nose: Nose normal.      Mouth/Throat:      Mouth: Mucous membranes are dry.   Eyes:      Extraocular Movements: " Extraocular movements intact.      Pupils: Pupils are equal, round, and reactive to light.   Cardiovascular:      Rate and Rhythm: Normal rate and regular rhythm.   Pulmonary:      Effort: Pulmonary effort is normal.      Breath sounds: Normal breath sounds.   Abdominal:      General: Abdomen is flat.      Palpations: Abdomen is soft.   Musculoskeletal:         General: Normal range of motion.      Cervical back: Normal range of motion and neck supple.   Skin:     General: Skin is warm and dry.   Neurological:      General: No focal deficit present.      Mental Status: She is alert and oriented to person, place, and time.   Psychiatric:         Mood and Affect: Mood normal.         Laboratory  None    Diagnostic Results:  CT scan abdomen-central hepatic soft tissue mass noted in the right lobe of the liver-would also recommend reimaging as Dr. Enamorado has suggested     Assessment and Plan   54-year-old white female with complaint of chronic epigastric pain discomfort.  We will move forward with diagnostic EGD for further evaluation.    PLAN:  Consent for EGD has been obtained after risks benefits and alternatives to procedure explained and all questions answered  Patient is currently on withdrawn in a which will need to be held prior to undergoing EGD  EGD plan for 09/11/2024

## 2024-09-03 ENCOUNTER — TELEPHONE (OUTPATIENT)
Dept: FAMILY MEDICINE | Facility: CLINIC | Age: 54
End: 2024-09-03
Payer: MEDICAID

## 2024-09-03 DIAGNOSIS — J45.909 ASTHMA, UNSPECIFIED ASTHMA SEVERITY, UNSPECIFIED WHETHER COMPLICATED, UNSPECIFIED WHETHER PERSISTENT: Primary | ICD-10-CM

## 2024-09-03 RX ORDER — IPRATROPIUM BROMIDE AND ALBUTEROL SULFATE 2.5; .5 MG/3ML; MG/3ML
3 SOLUTION RESPIRATORY (INHALATION) EVERY 6 HOURS PRN
Qty: 75 ML | Refills: 5 | Status: SHIPPED | OUTPATIENT
Start: 2024-09-03 | End: 2024-12-02

## 2024-09-09 ENCOUNTER — ANESTHESIA EVENT (OUTPATIENT)
Dept: SURGERY | Facility: HOSPITAL | Age: 54
End: 2024-09-09
Payer: MEDICAID

## 2024-09-09 NOTE — ANESTHESIA PREPROCEDURE EVALUATION
09/09/2024  Helena Vazquez is a 54 y.o., female.      Pre-op Assessment    I have reviewed the Patient Summary Reports.     I have reviewed the Nursing Notes. I have reviewed the NPO Status.   I have reviewed the Medications.     Review of Systems  Anesthesia Hx:  No problems with previous Anesthesia             Denies Family Hx of Anesthesia complications.    Denies Personal Hx of Anesthesia complications.                    Social:  Non-Smoker       Cardiovascular:     Hypertension                                        Pulmonary:    Asthma                    Renal/:  Renal/ Normal                 Hepatic/GI:     GERD Liver Disease,            Musculoskeletal:  Musculoskeletal Normal                Neurological:  Neurology Normal                                      Endocrine:  Diabetes         Morbid Obesity / BMI > 40  Psych:  Psychiatric History         Psychotic Disorder, Schizophrenia and Bipolar disorder.          Physical Exam  General: Well nourished, Cooperative, Alert and Oriented    Airway:  Mallampati: II / II  Mouth Opening: Normal  TM Distance: Normal  Tongue: Normal  Neck ROM: Normal ROM    Dental:  Edentulous    Chest/Lungs:  Normal Respiratory Rate    Heart:  Rate: Normal    Musculoskeletal:  Normal mobility      Anesthesia Plan  Type of Anesthesia, risks & benefits discussed:    Anesthesia Type: MAC  Intra-op Monitoring Plan: Standard ASA Monitors  Post Op Pain Control Plan: multimodal analgesia  Induction:  IV  Informed Consent: Informed consent signed with the Patient and all parties understand the risks and agree with anesthesia plan.  All questions answered.   ASA Score: 3  Day of Surgery Review of History & Physical: H&P Update referred to the surgeon/provider.  Anesthesia Plan Notes: Anesthesia plan was discussed with patient and/or representative. Risks and alternatives were  discussed including the possibility of alteration of plan.     Ready For Surgery From Anesthesia Perspective.     .

## 2024-09-11 ENCOUNTER — ANESTHESIA (OUTPATIENT)
Dept: SURGERY | Facility: HOSPITAL | Age: 54
End: 2024-09-11
Payer: MEDICAID

## 2024-09-18 ENCOUNTER — TELEPHONE (OUTPATIENT)
Dept: FAMILY MEDICINE | Facility: CLINIC | Age: 54
End: 2024-09-18
Payer: MEDICAID

## 2024-09-18 ENCOUNTER — HOSPITAL ENCOUNTER (OUTPATIENT)
Facility: HOSPITAL | Age: 54
Discharge: HOME | End: 2024-09-18
Attending: SURGERY | Admitting: FAMILY MEDICINE
Payer: MEDICAID

## 2024-09-18 VITALS
WEIGHT: 219.19 LBS | TEMPERATURE: 98 F | BODY MASS INDEX: 40.33 KG/M2 | DIASTOLIC BLOOD PRESSURE: 84 MMHG | OXYGEN SATURATION: 97 % | RESPIRATION RATE: 20 BRPM | HEART RATE: 74 BPM | SYSTOLIC BLOOD PRESSURE: 131 MMHG | HEIGHT: 62 IN

## 2024-09-18 DIAGNOSIS — R10.13 CHRONIC EPIGASTRIC PAIN: Primary | ICD-10-CM

## 2024-09-18 DIAGNOSIS — G89.29 CHRONIC EPIGASTRIC PAIN: Primary | ICD-10-CM

## 2024-09-18 PROCEDURE — 43239 EGD BIOPSY SINGLE/MULTIPLE: CPT | Performed by: SURGERY

## 2024-09-18 PROCEDURE — 88342 IMHCHEM/IMCYTCHM 1ST ANTB: CPT

## 2024-09-18 PROCEDURE — 63600175 PHARM REV CODE 636 W HCPCS: Performed by: NURSE ANESTHETIST, CERTIFIED REGISTERED

## 2024-09-18 PROCEDURE — 88313 SPECIAL STAINS GROUP 2: CPT

## 2024-09-18 PROCEDURE — 37000008 HC ANESTHESIA 1ST 15 MINUTES: Performed by: SURGERY

## 2024-09-18 PROCEDURE — 88305 TISSUE EXAM BY PATHOLOGIST: CPT | Performed by: SURGERY

## 2024-09-18 PROCEDURE — 25000003 PHARM REV CODE 250: Performed by: SURGERY

## 2024-09-18 PROCEDURE — 27201423 OPTIME MED/SURG SUP & DEVICES STERILE SUPPLY: Performed by: SURGERY

## 2024-09-18 PROCEDURE — 25000003 PHARM REV CODE 250: Performed by: NURSE ANESTHETIST, CERTIFIED REGISTERED

## 2024-09-18 RX ORDER — SODIUM CHLORIDE 9 MG/ML
INJECTION, SOLUTION INTRAVENOUS CONTINUOUS
Status: DISCONTINUED | OUTPATIENT
Start: 2024-09-18 | End: 2024-09-18 | Stop reason: SDUPTHER

## 2024-09-18 RX ORDER — ONDANSETRON HYDROCHLORIDE 2 MG/ML
4 INJECTION, SOLUTION INTRAVENOUS EVERY 12 HOURS PRN
Status: DISCONTINUED | OUTPATIENT
Start: 2024-09-18 | End: 2024-09-18 | Stop reason: HOSPADM

## 2024-09-18 RX ORDER — LIDOCAINE HYDROCHLORIDE 10 MG/ML
INJECTION, SOLUTION EPIDURAL; INFILTRATION; INTRACAUDAL; PERINEURAL
Status: DISCONTINUED | OUTPATIENT
Start: 2024-09-18 | End: 2024-09-18

## 2024-09-18 RX ORDER — SODIUM CHLORIDE 9 MG/ML
INJECTION, SOLUTION INTRAVENOUS CONTINUOUS
Status: DISCONTINUED | OUTPATIENT
Start: 2024-09-18 | End: 2024-09-18 | Stop reason: HOSPADM

## 2024-09-18 RX ORDER — PROPOFOL 10 MG/ML
VIAL (ML) INTRAVENOUS
Status: DISCONTINUED | OUTPATIENT
Start: 2024-09-18 | End: 2024-09-18

## 2024-09-18 RX ADMIN — PROPOFOL 50 MG: 10 INJECTION, EMULSION INTRAVENOUS at 07:09

## 2024-09-18 RX ADMIN — SODIUM CHLORIDE: 9 INJECTION, SOLUTION INTRAVENOUS at 07:09

## 2024-09-18 RX ADMIN — LIDOCAINE HYDROCHLORIDE 20 MG: 10 INJECTION, SOLUTION EPIDURAL; INFILTRATION; INTRACAUDAL; PERINEURAL at 07:09

## 2024-09-18 NOTE — ANESTHESIA POSTPROCEDURE EVALUATION
Anesthesia Post Evaluation    Patient: Helena Vazquez    Procedure(s) Performed: Procedure(s) (LRB):  EGD (ESOPHAGOGASTRODUODENOSCOPY) (N/A)    Final Anesthesia Type: MAC      Patient participation: Yes- Able to Participate  Level of consciousness: awake and alert  Post-procedure vital signs: reviewed and stable  Pain management: adequate  Airway patency: patent    PONV status at discharge: No PONV  Anesthetic complications: no      Cardiovascular status: blood pressure returned to baseline  Respiratory status: unassisted  Hydration status: euvolemic  Follow-up not needed.              Vitals Value Taken Time   /95 09/18/24 0844   Temp 37.1 °C (98.7 °F) 09/18/24 0844   Pulse 82 09/18/24 0844   Resp 18 09/18/24 0844   SpO2 97 % 09/18/24 0844         No case tracking events are documented in the log.      Pain/Seth Score: No data recorded

## 2024-09-18 NOTE — PLAN OF CARE
Pt declined breakfast but tolerated oral liquids without difficulty. Vital signs stable and Seth score 10/10. Discharge criteria met. Awaiting MD rounds for discharge.

## 2024-09-18 NOTE — OP NOTE
Procedure date: 09/18/2024      Indications: 54-year-old white female undergoing diagnostic workup for chronic epigastric pain discomfort with diagnostic EGD.      Preoperative diagnosis:  Chronic epigastric discomfort   Postoperative diagnosis:  Generalized mild gastritis     Procedure performed: EGD with biopsy     Procedure in detail: Patient was brought to the endoscopy suite laid in a slight supine position.  Intravenous anesthesia provided.  Oral bite plate placed in the mouth.  An endoscope was then passed through the oropharynx intubating the esophagus with easy transit into the stomach.  Upon entry into the stomach was fully insufflated for evaluation.  Overall the gastric mucosa appeared to be generally erythematous but no ulcerations were seen.  Biopsies were performed of the gastric antrum for histologic and micro evaluation.  Scope was then advanced into duodenum reaching the 2nd and 3rd portion which appeared to be grossly normal.  It was withdrawn back in the stomach retroflexed revealing a normal-appearing cardia fundus and proximal body with only mucosal inflammation noted.  It was returned to neutral position the stomach was evacuated of air the scope was withdrawn back to the Z-line which measured about 35 cm from the incisors.  The GE junction was grossly normal.  The remainder of the esophagus was normal during retrieval of the scope.  Patient was then relieved of anesthesia stable condition and transferred to postanesthesia care unit.    Complications: None   Estimated blood loss:  2 cc   Specimens: Gastric antrum for histologic and micro evaluation     Disposition: Upon recovering from anesthesia patient will be discharged to home with a follow up in surgery Clinic in 1 week.    Sandra De La Fuente MD

## 2024-09-18 NOTE — DISCHARGE INSTRUCTIONS
Resume diet and medications as prescribed    No driving or operating any heavy machinery for 24 hours     Keep your follow up appointment with Dr. De La Fuente - he will give you results from today's procedure at that time.

## 2024-09-19 LAB
POCT GLUCOSE: 297 MG/DL (ref 70–110)
PSYCHE PATHOLOGY RESULT: NORMAL

## 2024-09-20 ENCOUNTER — OFFICE VISIT (OUTPATIENT)
Dept: FAMILY MEDICINE | Facility: CLINIC | Age: 54
End: 2024-09-20
Payer: MEDICAID

## 2024-09-20 VITALS
WEIGHT: 216 LBS | OXYGEN SATURATION: 98 % | HEART RATE: 94 BPM | HEIGHT: 62 IN | DIASTOLIC BLOOD PRESSURE: 86 MMHG | SYSTOLIC BLOOD PRESSURE: 138 MMHG | TEMPERATURE: 98 F | RESPIRATION RATE: 18 BRPM | BODY MASS INDEX: 39.75 KG/M2

## 2024-09-20 DIAGNOSIS — G89.29 CHRONIC EPIGASTRIC PAIN: Primary | ICD-10-CM

## 2024-09-20 DIAGNOSIS — E11.65 TYPE 2 DIABETES MELLITUS WITH HYPERGLYCEMIA, WITHOUT LONG-TERM CURRENT USE OF INSULIN: Chronic | ICD-10-CM

## 2024-09-20 DIAGNOSIS — G47.00 INSOMNIA, UNSPECIFIED TYPE: ICD-10-CM

## 2024-09-20 DIAGNOSIS — R10.13 CHRONIC EPIGASTRIC PAIN: Primary | ICD-10-CM

## 2024-09-20 PROCEDURE — 3052F HG A1C>EQUAL 8.0%<EQUAL 9.0%: CPT | Mod: CPTII,,, | Performed by: FAMILY MEDICINE

## 2024-09-20 PROCEDURE — 99214 OFFICE O/P EST MOD 30 MIN: CPT | Mod: ,,, | Performed by: FAMILY MEDICINE

## 2024-09-20 PROCEDURE — 3075F SYST BP GE 130 - 139MM HG: CPT | Mod: CPTII,,, | Performed by: FAMILY MEDICINE

## 2024-09-20 PROCEDURE — 3061F NEG MICROALBUMINURIA REV: CPT | Mod: CPTII,,, | Performed by: FAMILY MEDICINE

## 2024-09-20 PROCEDURE — 3008F BODY MASS INDEX DOCD: CPT | Mod: CPTII,,, | Performed by: FAMILY MEDICINE

## 2024-09-20 PROCEDURE — 4010F ACE/ARB THERAPY RXD/TAKEN: CPT | Mod: CPTII,,, | Performed by: FAMILY MEDICINE

## 2024-09-20 PROCEDURE — 3066F NEPHROPATHY DOC TX: CPT | Mod: CPTII,,, | Performed by: FAMILY MEDICINE

## 2024-09-20 PROCEDURE — 3079F DIAST BP 80-89 MM HG: CPT | Mod: CPTII,,, | Performed by: FAMILY MEDICINE

## 2024-09-20 RX ORDER — ZOLPIDEM TARTRATE 5 MG/1
5 TABLET ORAL NIGHTLY PRN
Qty: 30 TABLET | Refills: 0 | Status: SHIPPED | OUTPATIENT
Start: 2024-09-20 | End: 2024-10-20

## 2024-09-20 NOTE — PROGRESS NOTES
Patient ID: [id number]     Chief Complaint: Abdominal Pain (Patient did scope with Dr. Abbey De La Fuente 2 days ago. Took biopsy, goes on 24 for results), 4 week f/u, and Diabetes    HPI:     Helena Vazquez is a 54 y.o. female here today for Abdominal Pain (Patient did scope with Dr. Abbey De La Fuente 2 days ago. Took biopsy, goes on 24 for results), 4 week f/u, and Diabetes.     -------------------------------------    Asthma    Bipolar disorder, unspecified    Diabetes mellitus type 2 in obese    DUB (dysfunctional uterine bleeding)    Edema leg    Family history of breast cancer in sister    x2    Hepatic steatosis    History of infection due to human papilloma virus (HPV)    HLD (hyperlipidemia)    HTN (hypertension)    Hypercholesterolemia    Hyperlipidemia due to type 2 diabetes mellitus    Hypertriglyceridemia    WALTER (iron deficiency anemia)    Microcytic anemia    Morbid obesity    Schizoaffective disorder    Type 2 diabetes mellitus    Type 2 diabetes mellitus with hyperglycemia    Urge incontinence of urine    Vision blurred        Past Surgical History:   Procedure Laterality Date     SECTION  1994     SECTION  1991     SECTION WITH TUBAL LIGATION  1998    CHOLECYSTECTOMY      ESOPHAGOGASTRODUODENOSCOPY N/A 2024    Procedure: EGD (ESOPHAGOGASTRODUODENOSCOPY);  Surgeon: Sandra De La Fuente MD;  Location: [facility];  Service: General;  Laterality: N/A;    HERNIA REPAIR      TUBAL LIGATION         Review of patient's allergies indicates:  No Known Allergies    Outpatient Medications Marked as Taking for the 24 encounter (Office Visit) with Fei Smith, DO   Medication Sig Dispense Refill    albuterol-ipratropium (DUO-NEB) 2.5 mg-0.5 mg/3 mL nebulizer solution Take 3 mLs by nebulization every 6 (six) hours as needed for Wheezing. Rescue 75 mL 5    alcohol antiseptic pads (ALCOHOL SWABS TOP)   alcohol swabs, See Instructions, use alcohol swab on  injection site prior to injection, # 1 box(es), 3 Refill(s), Pharmacy: [facility], 155, cm, Height/Length Dosing, 02/09/22 9:59:00 CST, 116.4, kg, Weight Dosing, 02/09/22 9:59...      atorvastatin (LIPITOR) 80 MG tablet Take 1 tablet (80 mg total) by mouth once daily. 90 tablet 3    famotidine (PEPCID) 20 MG tablet Take 1 tablet (20 mg total) by mouth 2 (two) times daily. 60 tablet 11    fenofibrate (TRICOR) 54 MG tablet Take 1 tablet (54 mg total) by mouth once daily. 90 tablet 3    hydroCHLOROthiazide (HYDRODIURIL) 25 MG tablet TAKE ONE TABLET BY MOUTH ONCE DAILY 30 tablet 2    JARDIANCE 25 mg tablet TAKE ONE TABLET BY MOUTH EVERY MORNING 30 tablet 5    lancets Misc   TRUEPLUS LANCETS 30G  MISC, See Instructions, USE ONCE DAILY AS DIRECTED, # 100 EA, 3 Refill(s), Pharmacy: [facility], 155, cm, Height/Length Dosing, 07/01/21 9:54:00 CDT, 121.2, kg, Weight Dosing, 07/01/21 9:54:00 CDT      losartan (COZAAR) 100 MG tablet Take 1 tablet (100 mg total) by mouth once daily. 90 tablet 3    ONETOUCH DELICA PLUS LANCET 33 gauge Misc USE ONCE DAILY AS DIRECTED 100 each 3    ONETOUCH ULTRA TEST Strp USE TO TEST BLOOD SUGAR ONCE DAILY 50 strip 5    oxybutynin (DITROPAN) 5 MG Tab Take 1 tablet (5 mg total) by mouth 3 (three) times daily. 90 tablet 3    OZEMPIC 0.25 mg or 0.5 mg (2 mg/3 mL) pen injector INJECT 0.5MG UNDER THE SKIN EVERY 7 DAYS 3 mL 2    pantoprazole (PROTONIX) 40 MG tablet Take 1 tablet (40 mg total) by mouth once daily. 90 tablet 3    pioglitazone (ACTOS) 30 MG tablet Take 1 tablet (30 mg total) by mouth once daily. 30 tablet 5    traZODone (DESYREL) 100 MG tablet Take 2 tablets (200 mg total) by mouth nightly as needed for Insomnia. 60 tablet 5    TRUEPLUS LANCETS 30 gauge Misc USE ONCE DAILY AS DIRECTED 100 each 3    [DISCONTINUED] glipiZIDE (GLUCOTROL) 10 MG tablet Take 1 tablet (10 mg total) by mouth 2 (two) times daily. 60 tablet 5       Social History      Socioeconomic History    Marital status:    Tobacco Use    Smoking status: Former    Smokeless tobacco: Never   Substance and Sexual Activity    Alcohol use: Never    Drug use: Never    Sexual activity: Yes     Partners: Male     Birth control/protection: None     Social Drivers of Health     Financial Resource Strain: High Risk (7/15/2024)    Overall Financial Resource Strain (CARDIA)     Difficulty of Paying Living Expenses: Hard   Food Insecurity: No Food Insecurity (7/15/2024)    Hunger Vital Sign     Worried About Running Out of Food in the Last Year: Never true     Ran Out of Food in the Last Year: Never true   Transportation Needs: No Transportation Needs (6/14/2023)    PRAPARE - Transportation     Lack of Transportation (Medical): No     Lack of Transportation (Non-Medical): No   Physical Activity: Unknown (7/15/2024)    Exercise Vital Sign     Days of Exercise per Week: 0 days   Stress: No Stress Concern Present (7/15/2024)    [employer] [place] [employer] - Occupational Stress Questionnaire     Feeling of Stress : Not at all   Housing Stability: Low Risk  (6/14/2023)    Housing Stability Vital Sign     Unable to Pay for Housing in the Last Year: No     Number of Places Lived in the Last Year: 1     Unstable Housing in the Last Year: No        Family History   Problem Relation Name Age of Onset    Heart attack Mother Shauna stephanie     Diabetes Mellitus Mother Shauna stephanie     Coronary artery disease Mother Shauna stephanie     Hypertension Mother Shauna stephanie     Asthma Mother Shauna stephanie     Depression Mother Shauna stephanie     Learning disabilities Mother Shauna stephanie     Lung cancer Father Jayson champagne     Diabetes Mellitus Father Jayson champagne     Diabetes Father Jayson champagne     Breast cancer Sister      Breast cancer Sister          Patient Care Team:  Fei Smith DO as PCP - General (Family Medicine)  Care, Jorge L Eye as Consulting Physician  "(Optometry)  Sandra De La Fuente MD as Consulting Physician (General Surgery)     Subjective:     Review of Systems   Constitutional:  Negative for chills and fever.   Respiratory:  Negative for shortness of breath.    Cardiovascular:  Negative for chest pain.   Gastrointestinal:  Positive for abdominal pain. Negative for constipation, diarrhea, nausea and vomiting.   Neurological:  Negative for dizziness and headaches.   Psychiatric/Behavioral:  The patient does not have insomnia.        See HPI for details  All Other ROS: Negative except as stated in HPI.       Objective:     /86 (BP Location: Left arm, Patient Position: Sitting, BP Method: Large (Manual))   Pulse 94   Temp 98.4 °F (36.9 °C)   Resp 18   Ht 5' 2" (1.575 m)   Wt 98 kg (216 lb)   SpO2 98%   BMI 39.51 kg/m²     Physical Exam  Vitals reviewed.   Constitutional:       General: She is not in acute distress.     Appearance: Normal appearance.   Cardiovascular:      Rate and Rhythm: Normal rate and regular rhythm.      Heart sounds: No murmur heard.     No friction rub. No gallop.   Pulmonary:      Effort: No respiratory distress.      Breath sounds: No wheezing, rhonchi or rales.   Musculoskeletal:         General: No swelling, tenderness or deformity.      Right lower leg: No edema.      Left lower leg: No edema.   Skin:     General: Skin is warm and dry.      Findings: No lesion or rash.   Neurological:      General: No focal deficit present.      Mental Status: She is alert.   Psychiatric:         Mood and Affect: Mood normal.         Assessment/Plan:     1. Chronic epigastric pain  -awaiting biopsy results.   2. Type 2 diabetes mellitus with hyperglycemia, without long-term current use of insulin  -Restart ozempic. If glucose levels remain elevated, Will increase dose of glipizide to BID. Next step likely to initiate insulin if glucose levels remain elevated.   3. Insomnia, unspecified type  -     zolpidem (AMBIEN) 5 MG Tab; Take 1 tablet (5 " mg total) by mouth nightly as needed (insomnia).  Dispense: 30 tablet; Refill: 0          Follow up:     Follow up in about 3 months (around 12/20/2024) for Wellness. In addition to their scheduled follow up, the patient has also been instructed to follow up on as needed basis.

## 2024-09-23 RX ORDER — GLIPIZIDE 10 MG/1
10 TABLET ORAL 2 TIMES DAILY
Qty: 60 TABLET | Refills: 5 | Status: SHIPPED | OUTPATIENT
Start: 2024-09-23

## 2024-09-25 PROBLEM — K29.70 HELICOBACTER PYLORI GASTRITIS: Status: ACTIVE | Noted: 2024-09-25

## 2024-09-25 PROBLEM — B96.81 HELICOBACTER PYLORI GASTRITIS: Status: ACTIVE | Noted: 2024-09-25

## 2024-10-03 ENCOUNTER — OFFICE VISIT (OUTPATIENT)
Dept: FAMILY MEDICINE | Facility: CLINIC | Age: 54
End: 2024-10-03
Payer: MEDICAID

## 2024-10-03 VITALS
BODY MASS INDEX: 39.45 KG/M2 | WEIGHT: 214.38 LBS | TEMPERATURE: 98 F | SYSTOLIC BLOOD PRESSURE: 158 MMHG | RESPIRATION RATE: 20 BRPM | OXYGEN SATURATION: 98 % | HEIGHT: 62 IN | HEART RATE: 99 BPM | DIASTOLIC BLOOD PRESSURE: 95 MMHG

## 2024-10-03 DIAGNOSIS — E11.65 TYPE 2 DIABETES MELLITUS WITH HYPERGLYCEMIA, WITH LONG-TERM CURRENT USE OF INSULIN: Primary | ICD-10-CM

## 2024-10-03 DIAGNOSIS — Z23 FLU VACCINE NEED: ICD-10-CM

## 2024-10-03 DIAGNOSIS — E11.65 TYPE 2 DIABETES MELLITUS WITH HYPERGLYCEMIA, WITHOUT LONG-TERM CURRENT USE OF INSULIN: ICD-10-CM

## 2024-10-03 DIAGNOSIS — Z79.4 TYPE 2 DIABETES MELLITUS WITH HYPERGLYCEMIA, WITH LONG-TERM CURRENT USE OF INSULIN: Primary | ICD-10-CM

## 2024-10-03 RX ORDER — BLOOD SUGAR DIAGNOSTIC
STRIP MISCELLANEOUS
Qty: 50 STRIP | Refills: 5 | Status: SHIPPED | OUTPATIENT
Start: 2024-10-03

## 2024-10-03 RX ORDER — INSULIN GLARGINE 100 [IU]/ML
10 INJECTION, SOLUTION SUBCUTANEOUS NIGHTLY
Qty: 3 ML | Refills: 0 | Status: SHIPPED | OUTPATIENT
Start: 2024-10-03 | End: 2024-11-02

## 2024-10-03 NOTE — PROGRESS NOTES
Patient ID: [id number]     Chief Complaint: Diabetes (To discuss insulin)    HPI:     Helena Vazquez, a 54-year-old woman, is here for a follow-up on her diabetes and to discuss starting insulin treatment. Her blood sugar levels have been consistently high, fluctuating between 400 and 500. This morning, her blood sugar was measured at 485. She has modified her diet, primarily consuming meat and gravy, and denies experiencing any episodes of hypoglycemia. She is accompanied by her cousin today and has no additional complaints.    Past Medical History:   Diagnosis Date    Asthma     Bipolar disorder, unspecified     Diabetes mellitus type 2 in obese     DUB (dysfunctional uterine bleeding)     Edema leg     Family history of breast cancer in sister     x2    Hepatic steatosis     History of infection due to human papilloma virus (HPV)     HLD (hyperlipidemia)     HTN (hypertension)     Hypercholesterolemia     Hyperlipidemia due to type 2 diabetes mellitus 2022    Hypertriglyceridemia     WALTER (iron deficiency anemia)     Microcytic anemia     Morbid obesity     Schizoaffective disorder     Type 2 diabetes mellitus     Type 2 diabetes mellitus with hyperglycemia     Urge incontinence of urine     Vision blurred         Past Surgical History:   Procedure Laterality Date     SECTION  1994     SECTION  1991     SECTION WITH TUBAL LIGATION  1998    CHOLECYSTECTOMY      ESOPHAGOGASTRODUODENOSCOPY N/A 2024    Procedure: EGD (ESOPHAGOGASTRODUODENOSCOPY);  Surgeon: Sandra De La Fuente MD;  Location: [facility];  Service: General;  Laterality: N/A;    HERNIA REPAIR      TUBAL LIGATION          Social History     Socioeconomic History    Marital status:    Tobacco Use    Smoking status: Former    Smokeless tobacco: Never   Substance and Sexual Activity    Alcohol use: Never    Drug use: Never    Sexual activity: Yes     Partners: Male     Birth control/protection:  None     Social Drivers of Health     Financial Resource Strain: High Risk (7/15/2024)    Overall Financial Resource Strain (CARDIA)     Difficulty of Paying Living Expenses: Hard   Food Insecurity: No Food Insecurity (7/15/2024)    Hunger Vital Sign     Worried About Running Out of Food in the Last Year: Never true     Ran Out of Food in the Last Year: Never true   Transportation Needs: No Transportation Needs (6/14/2023)    PRAPARE - Transportation     Lack of Transportation (Medical): No     Lack of Transportation (Non-Medical): No   Physical Activity: Unknown (7/15/2024)    Exercise Vital Sign     Days of Exercise per Week: 0 days   Stress: No Stress Concern Present (7/15/2024)    [employer] [place] [employer] - Occupational Stress Questionnaire     Feeling of Stress : Not at all   Housing Stability: Low Risk  (6/14/2023)    Housing Stability Vital Sign     Unable to Pay for Housing in the Last Year: No     Number of Places Lived in the Last Year: 1     Unstable Housing in the Last Year: No        Current Outpatient Medications   Medication Instructions    albuterol-ipratropium (DUO-NEB) 2.5 mg-0.5 mg/3 mL nebulizer solution 3 mLs, Nebulization, Every 6 hours PRN, Rescue    alcohol antiseptic pads (ALCOHOL SWABS TOP)   alcohol swabs, See Instructions, use alcohol swab on injection site prior to injection, # 1 box(es), 3 Refill(s), Pharmacy: [facility], 155, cm, Height/Length Dosing, 02/09/22 9:59:00 CST, 116.4, kg, Weight Dosing, 02/09/22 9:59...    amoxicillin (AMOXIL) 1,000 mg, Oral, Every 12 hours    atorvastatin (LIPITOR) 80 mg, Oral, Daily    clarithromycin (BIAXIN) 1,000 mg, Oral, Every 12 hours    famotidine (PEPCID) 20 mg, Oral, 2 times daily    fenofibrate (TRICOR) 54 mg, Oral, Daily    glipiZIDE (GLUCOTROL) 10 mg, Oral, 2 times daily    hydroCHLOROthiazide (HYDRODIURIL) 25 mg, Oral    JARDIANCE 25 mg tablet TAKE ONE TABLET BY MOUTH EVERY MORNING    lancets Misc  "  TRUEPLUS LANCETS 30G  MISC, See Instructions, USE ONCE DAILY AS DIRECTED, # 100 EA, 3 Refill(s), Pharmacy: [facility], 155, cm, Height/Length Dosing, 07/01/21 9:54:00 CDT, 121.2, kg, Weight Dosing, 07/01/21 9:54:00 CDT    LANTUS SOLOSTAR U-100 INSULIN 10 Units, Subcutaneous, Nightly    losartan (COZAAR) 100 mg, Oral, Daily    ONETOUCH DELICA PLUS LANCET 33 gauge Misc USE ONCE DAILY AS DIRECTED    ONETOUCH ULTRA TEST Strp USE TO TEST BLOOD SUGAR ONCE DAILY    oxybutynin (DITROPAN) 5 mg, Oral, 3 times daily    OZEMPIC 0.25 mg or 0.5 mg (2 mg/3 mL) pen injector INJECT 0.5MG UNDER THE SKIN EVERY 7 DAYS    pantoprazole (PROTONIX) 40 mg, Oral, Daily    pioglitazone (ACTOS) 30 mg, Oral, Daily    TRUEPLUS LANCETS 30 gauge Misc USE ONCE DAILY AS DIRECTED    zolpidem (AMBIEN) 5 mg, Oral, Nightly PRN       Review of patient's allergies indicates:  No Known Allergies     Patient Care Team:  Fei Smith DO as PCP - General (Family Medicine)  Care, Jorge L Eye as Consulting Physician (Optometry)  Sandra De La Fuente MD as Consulting Physician (General Surgery)     Subjective:     Review of Systems    12 point review of systems conducted, negative except as stated in the history of present illness. See HPI for details.    Objective:     Visit Vitals  BP (!) 158/95 (BP Location: Right arm, Patient Position: Sitting)   Pulse 99   Temp 97.9 °F (36.6 °C)   Resp 20   Ht 5' 2" (1.575 m)   Wt 97.3 kg (214 lb 6.4 oz)   SpO2 98%   BMI 39.21 kg/m²       Physical Exam  Vitals and nursing note reviewed.   Constitutional:       General: She is not in acute distress.     Appearance: She is not ill-appearing.   HENT:      Head: Normocephalic and atraumatic.      Mouth/Throat:      Mouth: Mucous membranes are moist.      Pharynx: Oropharynx is clear.   Eyes:      General: No scleral icterus.     Extraocular Movements: Extraocular movements intact.      Conjunctiva/sclera: Conjunctivae normal.      Pupils: Pupils are " equal, round, and reactive to light.   Neck:      Vascular: No carotid bruit.   Cardiovascular:      Rate and Rhythm: Normal rate and regular rhythm.      Heart sounds: No murmur heard.     No friction rub. No gallop.   Pulmonary:      Effort: Pulmonary effort is normal. No respiratory distress.      Breath sounds: Normal breath sounds. No wheezing, rhonchi or rales.   Abdominal:      General: Abdomen is flat. Bowel sounds are normal. There is no distension.      Palpations: Abdomen is soft. There is no mass.      Tenderness: There is no abdominal tenderness.   Musculoskeletal:         General: Normal range of motion.      Cervical back: Normal range of motion and neck supple.   Skin:     General: Skin is warm and dry.   Neurological:      General: No focal deficit present.      Mental Status: She is alert.   Psychiatric:         Mood and Affect: Mood normal.         Behavior: Behavior is withdrawn. Behavior is cooperative.       Labs Reviewed:     Chemistry:  Lab Results   Component Value Date     07/02/2024    K 4.6 07/02/2024    BUN 9.0 (L) 07/02/2024    CREATININE 0.77 07/02/2024    EGFRNORACEVR >60 07/02/2024    GLUCOSE 239 (H) 07/02/2024    CALCIUM 10.1 07/02/2024    ALKPHOS 136 07/02/2024    LABPROT 7.7 07/02/2024    ALBUMIN 3.8 07/02/2024    BILIDIR 0.2 02/08/2022    IBILI 0.30 02/08/2022    AST 15 07/02/2024    ALT 23 07/02/2024    MG 2.00 04/12/2021    TSH 2.7489 02/08/2022        Lab Results   Component Value Date    HGBA1C 8.5 (H) 06/20/2024      Assessment:       ICD-10-CM ICD-9-CM   1. Type 2 diabetes mellitus with hyperglycemia, with long-term current use of insulin  E11.65 250.00    Z79.4 790.29     V58.67   2. Flu vaccine need  Z23 V04.81     Plan:     1. Type 2 diabetes mellitus with hyperglycemia, with long-term current use of insulin  Assessment & Plan:  Start Lantus 10 units every night.   Check blood sugars every morning, before lunch, before dinner, and before bed.  Bring all readings to  visit next week.       Educated patient on the importance of medication compliance and plan of care.   Our goal is to get A1C less than 7 as this decreases the risk of hyperglycemia along with microvascular complications such as retinopathy, nephropathy, neuropathy, and heart disease.        Orders:  -     insulin glargine U-100, Lantus, (LANTUS SOLOSTAR U-100 INSULIN) 100 unit/mL (3 mL) InPn pen; Inject 10 Units into the skin every evening.  Dispense: 3 mL; Refill: 0    2. Flu vaccine need  -     influenza (Flulaval, Fluzone, Fluarix) 45 mcg/0.5 mL IM vaccine (> or = 6 mo) 0.5 mL       I spent a total of 36 minutes on the day of the visit.This includes face to face time and non-face to face time preparing to see the patient (eg, review of tests), obtaining and/or reviewing separately obtained history, documenting clinical information in the electronic or other health record, independently interpreting results and communicating results to the patient/family/caregiver, or care coordinator.    Follow up in about 1 week (around 10/10/2024) for Follow Up with Smiley . In addition to their scheduled follow up, the patient has also been instructed to follow up on as needed basis.     Future Appointments   Date Time Provider Department Center   10/8/2024  8:40 AM Smiley Natarajan NP [facility]   12/23/2024  2:40 PM Fei Smith DO [facility]    12/24/2024  1:00 PM Smiley Natarajan NP [facility]   4/28/2025  3:00 PM Smiley Natarajan NP [facility] Ka'Ryn Franklin, NP

## 2024-10-03 NOTE — ASSESSMENT & PLAN NOTE
Start Lantus 10 units every night.   Check blood sugars every morning, before lunch, before dinner, and before bed.  Bring all readings to visit next week.       Educated patient on the importance of medication compliance and plan of care.   Our goal is to get A1C less than 7 as this decreases the risk of hyperglycemia along with microvascular complications such as retinopathy, nephropathy, neuropathy, and heart disease.

## 2024-10-03 NOTE — PATIENT INSTRUCTIONS
Start Lantus 10 units every night.   Check blood sugars every morning, before lunch, before dinner, and before bed.  BRING ALL READINGS TO VISIT NEXT WEEK.

## 2024-10-08 ENCOUNTER — OFFICE VISIT (OUTPATIENT)
Dept: FAMILY MEDICINE | Facility: CLINIC | Age: 54
End: 2024-10-08
Payer: MEDICAID

## 2024-10-08 VITALS
OXYGEN SATURATION: 99 % | HEART RATE: 102 BPM | HEIGHT: 62 IN | RESPIRATION RATE: 20 BRPM | TEMPERATURE: 98 F | DIASTOLIC BLOOD PRESSURE: 88 MMHG | SYSTOLIC BLOOD PRESSURE: 136 MMHG | WEIGHT: 210.81 LBS | BODY MASS INDEX: 38.79 KG/M2

## 2024-10-08 DIAGNOSIS — E11.65 TYPE 2 DIABETES MELLITUS WITH HYPERGLYCEMIA, WITH LONG-TERM CURRENT USE OF INSULIN: Primary | ICD-10-CM

## 2024-10-08 DIAGNOSIS — Z79.4 TYPE 2 DIABETES MELLITUS WITH HYPERGLYCEMIA, WITH LONG-TERM CURRENT USE OF INSULIN: Primary | ICD-10-CM

## 2024-10-08 DIAGNOSIS — E11.65 TYPE 2 DIABETES MELLITUS WITH HYPERGLYCEMIA, WITH LONG-TERM CURRENT USE OF INSULIN: ICD-10-CM

## 2024-10-08 DIAGNOSIS — Z79.4 TYPE 2 DIABETES MELLITUS WITH HYPERGLYCEMIA, WITH LONG-TERM CURRENT USE OF INSULIN: ICD-10-CM

## 2024-10-08 PROCEDURE — 3008F BODY MASS INDEX DOCD: CPT | Mod: CPTII,,,

## 2024-10-08 PROCEDURE — 1159F MED LIST DOCD IN RCRD: CPT | Mod: CPTII,,,

## 2024-10-08 PROCEDURE — 3075F SYST BP GE 130 - 139MM HG: CPT | Mod: CPTII,,,

## 2024-10-08 PROCEDURE — 3079F DIAST BP 80-89 MM HG: CPT | Mod: CPTII,,,

## 2024-10-08 PROCEDURE — 1160F RVW MEDS BY RX/DR IN RCRD: CPT | Mod: CPTII,,,

## 2024-10-08 PROCEDURE — 3066F NEPHROPATHY DOC TX: CPT | Mod: CPTII,,,

## 2024-10-08 PROCEDURE — 3061F NEG MICROALBUMINURIA REV: CPT | Mod: CPTII,,,

## 2024-10-08 PROCEDURE — 3052F HG A1C>EQUAL 8.0%<EQUAL 9.0%: CPT | Mod: CPTII,,,

## 2024-10-08 PROCEDURE — 99214 OFFICE O/P EST MOD 30 MIN: CPT | Mod: ,,,

## 2024-10-08 PROCEDURE — 4010F ACE/ARB THERAPY RXD/TAKEN: CPT | Mod: CPTII,,,

## 2024-10-08 RX ORDER — INSULIN GLARGINE 100 [IU]/ML
12 INJECTION, SOLUTION SUBCUTANEOUS NIGHTLY
Qty: 3.6 ML | Refills: 0 | Status: SHIPPED | OUTPATIENT
Start: 2024-10-08 | End: 2024-11-07

## 2024-10-08 NOTE — ASSESSMENT & PLAN NOTE
Increase Lantus to 12 units every night.  If blood sugar greater than 300 in the evening, increase Lantus to 14 units every night.  Return to clinic in 1 week with blood sugar readings.  Dexcom sample installed today in clinic due to frequent blood sugar checks.

## 2024-10-08 NOTE — PROGRESS NOTES
Patient ID: [id number]     Chief Complaint: Diabetes (1 week follow up)    HPI:     Helena Vazquez, a 54-year-old woman, is here today for a follow-up on her Type 2 diabetes management after starting Lantus last week. She has provided her blood sugar logs for evaluation. Her blood sugar levels remain high, ranging from 300 to 400, despite taking 10 units of Lantus daily. She monitors her blood sugar four times a day. There is a noted improvement in her morning levels. She has no additional complaints today.    Past Medical History:   Diagnosis Date    Asthma     Bipolar disorder, unspecified     Diabetes mellitus type 2 in obese     DUB (dysfunctional uterine bleeding)     Edema leg     Family history of breast cancer in sister     x2    Hepatic steatosis     History of infection due to human papilloma virus (HPV)     HLD (hyperlipidemia)     HTN (hypertension)     Hypercholesterolemia     Hyperlipidemia due to type 2 diabetes mellitus 2022    Hypertriglyceridemia     WALTER (iron deficiency anemia)     Microcytic anemia     Morbid obesity     Schizoaffective disorder     Type 2 diabetes mellitus     Type 2 diabetes mellitus with hyperglycemia     Urge incontinence of urine     Vision blurred         Past Surgical History:   Procedure Laterality Date     SECTION  1994     SECTION  1991     SECTION WITH TUBAL LIGATION  1998    CHOLECYSTECTOMY      ESOPHAGOGASTRODUODENOSCOPY N/A 2024    Procedure: EGD (ESOPHAGOGASTRODUODENOSCOPY);  Surgeon: Sandra De La Fuente MD;  Location: [facility];  Service: General;  Laterality: N/A;    HERNIA REPAIR      TUBAL LIGATION          Social History     Socioeconomic History    Marital status:    Tobacco Use    Smoking status: Former    Smokeless tobacco: Never   Substance and Sexual Activity    Alcohol use: Never    Drug use: Never    Sexual activity: Yes     Partners: Male     Birth control/protection: None     Social  Drivers of Health     Financial Resource Strain: High Risk (7/15/2024)    Overall Financial Resource Strain (CARDIA)     Difficulty of Paying Living Expenses: Hard   Food Insecurity: No Food Insecurity (7/15/2024)    Hunger Vital Sign     Worried About Running Out of Food in the Last Year: Never true     Ran Out of Food in the Last Year: Never true   Transportation Needs: No Transportation Needs (6/14/2023)    PRAPARE - Transportation     Lack of Transportation (Medical): No     Lack of Transportation (Non-Medical): No   Physical Activity: Unknown (7/15/2024)    Exercise Vital Sign     Days of Exercise per Week: 0 days   Stress: No Stress Concern Present (7/15/2024)    [employer] - Occupational Stress Questionnaire     Feeling of Stress : Not at all   Housing Stability: Low Risk  (6/14/2023)    Housing Stability Vital Sign     Unable to Pay for Housing in the Last Year: No     Number of Places Lived in the Last Year: 1     Unstable Housing in the Last Year: No        Current Outpatient Medications   Medication Instructions    albuterol-ipratropium (DUO-NEB) 2.5 mg-0.5 mg/3 mL nebulizer solution 3 mLs, Nebulization, Every 6 hours PRN, Rescue    alcohol antiseptic pads (ALCOHOL SWABS TOP)   alcohol swabs, See Instructions, use alcohol swab on injection site prior to injection, # 1 box(es), 3 Refill(s), Pharmacy: [facility], 155, cm, Height/Length Dosing, 02/09/22 9:59:00 CST, 116.4, kg, Weight Dosing, 02/09/22 9:59...    atorvastatin (LIPITOR) 80 mg, Oral, Daily    famotidine (PEPCID) 20 mg, Oral, 2 times daily    fenofibrate (TRICOR) 54 mg, Oral, Daily    glipiZIDE (GLUCOTROL) 10 mg, Oral, 2 times daily    hydroCHLOROthiazide (HYDRODIURIL) 25 mg, Oral    JARDIANCE 25 mg tablet TAKE ONE TABLET BY MOUTH EVERY MORNING    lancets Misc   TRUEPLUS LANCETS 30G  MISC, See Instructions, USE ONCE DAILY AS DIRECTED, # 100 EA, 3 Refill(s), Pharmacy: [facility]   "[place], 155, cm, Height/Length Dosing, 07/01/21 9:54:00 CDT, 121.2, kg, Weight Dosing, 07/01/21 9:54:00 CDT    LANTUS SOLOSTAR U-100 INSULIN 12 Units, Subcutaneous, Nightly    losartan (COZAAR) 100 mg, Oral, Daily    ONETOUCH DELICA PLUS LANCET 33 gauge Misc USE ONCE DAILY AS DIRECTED    ONETOUCH ULTRA TEST Strp USE TO CHECK BLOOD SUGAR ONCE A DAY    oxybutynin (DITROPAN) 5 mg, Oral, 3 times daily    OZEMPIC 0.25 mg or 0.5 mg (2 mg/3 mL) pen injector INJECT 0.5MG UNDER THE SKIN EVERY 7 DAYS    pantoprazole (PROTONIX) 40 mg, Oral, Daily    pioglitazone (ACTOS) 30 mg, Oral, Daily    TRUEPLUS LANCETS 30 gauge Misc USE ONCE DAILY AS DIRECTED    zolpidem (AMBIEN) 5 mg, Oral, Nightly PRN       Review of patient's allergies indicates:  No Known Allergies     Patient Care Team:  Fei Smith DO as PCP - General (Family Medicine)  Care, [facility] as Consulting Physician (Optometry)  Sandra De La Fuente MD as Consulting Physician (General Surgery)     Subjective:     Review of Systems    12 point review of systems conducted, negative except as stated in the history of present illness. See HPI for details.    Objective:     Visit Vitals  /88 (BP Location: Right arm, Patient Position: Sitting)   Pulse 102   Temp 98 °F (36.7 °C)   Resp 20   Ht 5' 2" (1.575 m)   Wt 95.6 kg (210 lb 12.8 oz)   SpO2 99%   BMI 38.56 kg/m²     Physical Exam  Vitals and nursing note reviewed.   Constitutional:       General: She is not in acute distress.     Appearance: She is not ill-appearing.   HENT:      Head: Normocephalic and atraumatic.      Mouth/Throat:      Mouth: Mucous membranes are moist.      Pharynx: Oropharynx is clear.   Eyes:      General: No scleral icterus.     Extraocular Movements: Extraocular movements intact.      Conjunctiva/sclera: Conjunctivae normal.      Pupils: Pupils are equal, round, and reactive to light.   Neck:      Vascular: No carotid bruit.   Cardiovascular:      Rate and Rhythm: Normal rate and " regular rhythm.      Heart sounds: No murmur heard.     No friction rub. No gallop.   Pulmonary:      Effort: Pulmonary effort is normal. No respiratory distress.      Breath sounds: Normal breath sounds. No wheezing, rhonchi or rales.   Abdominal:      General: Abdomen is flat. Bowel sounds are normal. There is no distension.      Palpations: Abdomen is soft. There is no mass.      Tenderness: There is no abdominal tenderness.   Musculoskeletal:         General: Normal range of motion.      Cervical back: Normal range of motion and neck supple.   Skin:     General: Skin is warm and dry.   Neurological:      General: No focal deficit present.      Mental Status: She is alert.   Psychiatric:         Mood and Affect: Mood normal.       Labs Reviewed:     Chemistry:  Lab Results   Component Value Date     07/02/2024    K 4.6 07/02/2024    BUN 9.0 (L) 07/02/2024    CREATININE 0.77 07/02/2024    EGFRNORACEVR >60 07/02/2024    GLUCOSE 239 (H) 07/02/2024    CALCIUM 10.1 07/02/2024    ALKPHOS 136 07/02/2024    LABPROT 7.7 07/02/2024    ALBUMIN 3.8 07/02/2024    BILIDIR 0.2 02/08/2022    IBILI 0.30 02/08/2022    AST 15 07/02/2024    ALT 23 07/02/2024    MG 2.00 04/12/2021    TSH 2.7489 02/08/2022        Lab Results   Component Value Date    HGBA1C 8.5 (H) 06/20/2024        Hematology:  Lab Results   Component Value Date    WBC 10.44 07/02/2024    HGB 15.0 07/02/2024    HCT 46.8 07/02/2024     07/02/2024       Lipid Panel:  Lab Results   Component Value Date    CHOL 194 06/20/2024    HDL 39 06/20/2024    .00 06/20/2024    TRIG 189 (H) 06/20/2024    TOTALCHOLEST 5 06/20/2024        Urine:  Lab Results   Component Value Date    APPEARANCEUA Turbid (A) 04/16/2024    SGUA >=1.030 04/16/2024    PROTEINUA Trace (A) 04/16/2024    KETONESUA Trace (A) 04/16/2024    LEUKOCYTESUR Negative 04/16/2024    RBCUA 0-2 04/16/2024    WBCUA 0-2 04/16/2024    BACTERIA Few (A) 04/16/2024    CREATRANDUR 69.3 03/19/2024       Assessment:       ICD-10-CM ICD-9-CM   1. Type 2 diabetes mellitus with hyperglycemia, with long-term current use of insulin  E11.65 250.00    Z79.4 790.29     V58.67     Plan:     1. Type 2 diabetes mellitus with hyperglycemia, with long-term current use of insulin  Assessment & Plan:  Increase Lantus to 12 units every night.  If blood sugar greater than 300 in the evening, increase Lantus to 14 units every night.  Return to clinic in 1 week with blood sugar readings.  Dexcom sample installed today in clinic due to frequent blood sugar checks.    Orders:  -     insulin glargine U-100, Lantus, (LANTUS SOLOSTAR U-100 INSULIN) 100 unit/mL (3 mL) InPn pen; Inject 12 Units into the skin every evening.  Dispense: 3.6 mL; Refill: 0  -     Ambulatory referral/consult to Home Health; Future; Expected date: 10/08/2024      Follow up in about 1 week (around 10/15/2024) for DM II Follow Up (titrating Lantus) . In addition to their scheduled follow up, the patient has also been instructed to follow up on as needed basis.     Future Appointments   Date Time Provider Department Center   10/15/2024  8:00 AM Smiley Ntaarajan NP [facility]   12/23/2024  2:40 PM Fei Smith DO [facility]    12/24/2024  1:00 PM Smiley Natarajan NP [facility]   4/28/2025  3:00 PM Smiley Natarajan NP [facility] Ka'Ryn Franklin, NP

## 2024-10-15 ENCOUNTER — TELEPHONE (OUTPATIENT)
Dept: FAMILY MEDICINE | Facility: CLINIC | Age: 54
End: 2024-10-15

## 2024-10-15 NOTE — TELEPHONE ENCOUNTER
Just spoke with Amanda with home health she stated her dexcom wasn't not working but after speaking with the nurse the reading was 221. Compared to her fingerstick her reading was 287. The nurse is instructing her to increase her insulin by 2 units until her sugars are under control. Instructed them to call for her to come in next week for a follow up. She will call back to schedule

## 2024-10-16 ENCOUNTER — TELEPHONE (OUTPATIENT)
Dept: FAMILY MEDICINE | Facility: CLINIC | Age: 54
End: 2024-10-16

## 2024-10-16 DIAGNOSIS — E11.65 TYPE 2 DIABETES MELLITUS WITH HYPERGLYCEMIA, WITHOUT LONG-TERM CURRENT USE OF INSULIN: Primary | ICD-10-CM

## 2024-10-16 DIAGNOSIS — I10 HYPERTENSION, UNSPECIFIED TYPE: ICD-10-CM

## 2024-10-16 DIAGNOSIS — E11.65 TYPE 2 DIABETES MELLITUS WITH HYPERGLYCEMIA, WITHOUT LONG-TERM CURRENT USE OF INSULIN: ICD-10-CM

## 2024-10-16 RX ORDER — LANCETS 33 GAUGE
1 EACH MISCELLANEOUS 3 TIMES DAILY
Qty: 100 EACH | Refills: 3 | Status: SHIPPED | OUTPATIENT
Start: 2024-10-16

## 2024-10-16 RX ORDER — BLOOD-GLUCOSE SENSOR
1 EACH MISCELLANEOUS
Qty: 3 EACH | Refills: 2 | Status: SHIPPED | OUTPATIENT
Start: 2024-10-16 | End: 2025-10-16

## 2024-10-23 ENCOUNTER — TELEPHONE (OUTPATIENT)
Dept: FAMILY MEDICINE | Facility: CLINIC | Age: 54
End: 2024-10-23
Payer: MEDICAID

## 2024-10-23 DIAGNOSIS — E11.65 TYPE 2 DIABETES MELLITUS WITH HYPERGLYCEMIA, WITHOUT LONG-TERM CURRENT USE OF INSULIN: Primary | ICD-10-CM

## 2024-10-23 DIAGNOSIS — I10 HYPERTENSION, UNSPECIFIED TYPE: ICD-10-CM

## 2024-10-23 RX ORDER — PEN NEEDLE, DIABETIC 30 GX3/16"
1 NEEDLE, DISPOSABLE MISCELLANEOUS DAILY
Qty: 50 EACH | Refills: 2 | Status: SHIPPED | OUTPATIENT
Start: 2024-10-23

## 2024-10-23 RX ORDER — LANCETS 33 GAUGE
1 EACH MISCELLANEOUS 4 TIMES DAILY
Qty: 120 EACH | Refills: 3 | Status: SHIPPED | OUTPATIENT
Start: 2024-10-23

## 2024-10-29 ENCOUNTER — TELEPHONE (OUTPATIENT)
Dept: FAMILY MEDICINE | Facility: CLINIC | Age: 54
End: 2024-10-29

## 2024-10-29 ENCOUNTER — OFFICE VISIT (OUTPATIENT)
Dept: FAMILY MEDICINE | Facility: CLINIC | Age: 54
End: 2024-10-29
Payer: MEDICAID

## 2024-10-29 ENCOUNTER — LAB VISIT (OUTPATIENT)
Dept: LAB | Facility: HOSPITAL | Age: 54
End: 2024-10-29
Attending: FAMILY MEDICINE
Payer: MEDICAID

## 2024-10-29 VITALS
WEIGHT: 212.81 LBS | OXYGEN SATURATION: 99 % | TEMPERATURE: 98 F | HEIGHT: 62 IN | HEART RATE: 84 BPM | DIASTOLIC BLOOD PRESSURE: 86 MMHG | RESPIRATION RATE: 20 BRPM | SYSTOLIC BLOOD PRESSURE: 139 MMHG | BODY MASS INDEX: 39.16 KG/M2

## 2024-10-29 DIAGNOSIS — E11.65 TYPE 2 DIABETES MELLITUS WITH HYPERGLYCEMIA, WITH LONG-TERM CURRENT USE OF INSULIN: ICD-10-CM

## 2024-10-29 DIAGNOSIS — E11.65 TYPE 2 DIABETES MELLITUS WITH HYPERGLYCEMIA, WITHOUT LONG-TERM CURRENT USE OF INSULIN: Chronic | ICD-10-CM

## 2024-10-29 DIAGNOSIS — Z79.4 TYPE 2 DIABETES MELLITUS WITH HYPERGLYCEMIA, WITH LONG-TERM CURRENT USE OF INSULIN: ICD-10-CM

## 2024-10-29 LAB
ALBUMIN SERPL-MCNC: 3.5 G/DL (ref 3.5–5)
ALBUMIN/GLOB SERPL: 0.9 RATIO (ref 1.1–2)
ALP SERPL-CCNC: 116 UNIT/L (ref 40–150)
ALT SERPL-CCNC: 18 UNIT/L (ref 0–55)
ANION GAP SERPL CALC-SCNC: 9 MEQ/L
AST SERPL-CCNC: 13 UNIT/L (ref 5–34)
BILIRUB SERPL-MCNC: 0.8 MG/DL
BUN SERPL-MCNC: 10 MG/DL (ref 9.8–20.1)
CALCIUM SERPL-MCNC: 9.9 MG/DL (ref 8.4–10.2)
CHLORIDE SERPL-SCNC: 103 MMOL/L (ref 98–107)
CO2 SERPL-SCNC: 28 MMOL/L (ref 22–29)
CREAT SERPL-MCNC: 0.67 MG/DL (ref 0.55–1.02)
CREAT/UREA NIT SERPL: 15
EST. AVERAGE GLUCOSE BLD GHB EST-MCNC: 226 MG/DL
GFR SERPLBLD CREATININE-BSD FMLA CKD-EPI: >60 ML/MIN/1.73/M2
GLOBULIN SER-MCNC: 3.9 GM/DL (ref 2.4–3.5)
GLUCOSE SERPL-MCNC: 189 MG/DL (ref 74–100)
HBA1C MFR BLD: 9.5 %
POTASSIUM SERPL-SCNC: 4 MMOL/L (ref 3.5–5.1)
PROT SERPL-MCNC: 7.4 GM/DL (ref 6.4–8.3)
SODIUM SERPL-SCNC: 140 MMOL/L (ref 136–145)

## 2024-10-29 PROCEDURE — 3008F BODY MASS INDEX DOCD: CPT | Mod: CPTII,,,

## 2024-10-29 PROCEDURE — 83036 HEMOGLOBIN GLYCOSYLATED A1C: CPT

## 2024-10-29 PROCEDURE — 36415 COLL VENOUS BLD VENIPUNCTURE: CPT

## 2024-10-29 PROCEDURE — 3061F NEG MICROALBUMINURIA REV: CPT | Mod: CPTII,,,

## 2024-10-29 PROCEDURE — 3075F SYST BP GE 130 - 139MM HG: CPT | Mod: CPTII,,,

## 2024-10-29 PROCEDURE — 3066F NEPHROPATHY DOC TX: CPT | Mod: CPTII,,,

## 2024-10-29 PROCEDURE — 1160F RVW MEDS BY RX/DR IN RCRD: CPT | Mod: CPTII,,,

## 2024-10-29 PROCEDURE — 1159F MED LIST DOCD IN RCRD: CPT | Mod: CPTII,,,

## 2024-10-29 PROCEDURE — 99214 OFFICE O/P EST MOD 30 MIN: CPT | Mod: ,,,

## 2024-10-29 PROCEDURE — 80053 COMPREHEN METABOLIC PANEL: CPT

## 2024-10-29 PROCEDURE — 3046F HEMOGLOBIN A1C LEVEL >9.0%: CPT | Mod: CPTII,,,

## 2024-10-29 PROCEDURE — 4010F ACE/ARB THERAPY RXD/TAKEN: CPT | Mod: CPTII,,,

## 2024-10-29 PROCEDURE — 3079F DIAST BP 80-89 MM HG: CPT | Mod: CPTII,,,

## 2024-10-29 RX ORDER — INSULIN GLARGINE 100 [IU]/ML
INJECTION, SOLUTION SUBCUTANEOUS
Qty: 5.4 ML | Refills: 0 | Status: CANCELLED | OUTPATIENT
Start: 2024-10-29

## 2024-10-29 RX ORDER — SEMAGLUTIDE 1.34 MG/ML
1 INJECTION, SOLUTION SUBCUTANEOUS
Qty: 3 ML | Refills: 2 | Status: SHIPPED | OUTPATIENT
Start: 2024-10-29 | End: 2025-01-27

## 2024-10-30 ENCOUNTER — EXTERNAL HOME HEALTH (OUTPATIENT)
Dept: HOME HEALTH SERVICES | Facility: HOSPITAL | Age: 54
End: 2024-10-30
Payer: MEDICAID

## 2024-11-06 ENCOUNTER — CLINICAL SUPPORT (OUTPATIENT)
Facility: CLINIC | Age: 54
End: 2024-11-06
Payer: MEDICAID

## 2024-11-06 VITALS — BODY MASS INDEX: 39.86 KG/M2 | HEIGHT: 62 IN | WEIGHT: 216.63 LBS

## 2024-11-06 DIAGNOSIS — E11.65 TYPE 2 DIABETES MELLITUS WITH HYPERGLYCEMIA, WITHOUT LONG-TERM CURRENT USE OF INSULIN: Chronic | ICD-10-CM

## 2024-11-06 PROCEDURE — G0108 DIAB MANAGE TRN  PER INDIV: HCPCS | Mod: S$GLB,,, | Performed by: FAMILY MEDICINE

## 2024-11-06 NOTE — PROGRESS NOTES
"Diabetes Care Specialist Progress Note  Author: Earlene Stubbs RN  Date: 11/6/2024    Intake    Program Intake  Reason for Diabetes Program Visit:: Initial Diabetes Assessment  Current diabetes risk level:: moderate  In the last month, have you used the ER or been admitted to the hospital: No  Permission to speak with others about care:: yes    Current Diabetes Treatment: Oral Medications, Insulin, DM Injectables  Oral Medication Type/Dose: glipizide 10mg bid, jardiance 25mg daily, actos 30mg daily  DM Injectables Type/Dose: Ozempic 1mg every monday  Method of insulin delivery?: Injections  Injection Type: Pens  Pen Type/Dose: Lantus 20 units Qhs    Continuous Glucose Monitoring  Patient has CGM: Yes  Personal CGM type:: Dexcom g7  GMI Date: 11/06/24  GMI Value: 8.2 %    Lab Results   Component Value Date    HGBA1C 9.5 (H) 10/29/2024       Weight: 98.2 kg (216 lb 9.6 oz)   Height: 5' 2" (157.5 cm)   Body mass index is 39.62 kg/m².    Lifestyle Coping Support & Clinical    Lifestyle/Coping/Support  Compared to other people your age, how would you rate your health?: Fair  Does anyone in your family have diabetes or does anyone in your family support you in your diabetes care?: cousin Lacey good support  List anything about Diabetes that causes you stress?: not having soda or sweets  How do you deal with stress/distress?: support from loved ones  Learning Barriers:: Other (Comment) (behavioral with bi polar and Schizophrenia)  Culture or [employer] beliefs that may impact ability to access healthcare: No  Psychosocial/Coping Skills Assessment Completed: : Yes  Assessment indicates:: Adequate understanding, Other (comment) (Has HH once a week)  Area of need?: No    Problem Review  Active Comorbidities: Obesity, Mental Health Condition(s)    Diabetes Self-Management Skills Assessment    Medication Skills Assessment  Patient is able to identify current diabetes medications, dosages, and appropriate timing of " medications.: yes  Patient reports problems or concerns with current medication regimen.: yes  Medication regimen problems/concerns:: does not feel like regimen is working  Patient is  aware that some diabetes medications can cause low blood sugar?: Yes  Medication Skills Assessment Completed:: Yes  Assessment indicates:: Knowledge deficit, Instruction Needed  Area of need?: Yes    Diabetes Disease Process/Treatment Options  Diabetes Type?: Type II  If previous diabetes education, when/where:: no  What are your goals for this education session?: learn how to eat better  Diabetes Disease Process/Treatment Options: Skills Assessment Completed: No  Deferred due to:: Time  Area of need?: Deferred    Nutrition/Healthy Eating  Meal Plan 24 Hour Recall - Breakfast: 2% milk and grits  Meal Plan 24 Hour Recall - Dinner: tv dinner  Meal Plan 24 Hour Recall - Snack: chocolate cake or sugar free candy  Meal Plan 24 Hour Recall - Beverage: water, juice, 2% milk  Who shops/cooks?: she does  Patient can identify foods that impact blood sugar.: no (see comments)  Challenges to healthy eating:: eating when feeling depressed/stressed, snacking between meals and at night, portion control  Nutrition/Healthy Eating Skills Assessment Completed:: Yes  Assessment indicates:: Knowledge deficit, Instruction Needed  Area of need?: Yes    Physical Activity/Exercise  Patient's daily activity level:: lightly active  Patient formally exercises outside of work.: no  Reasons for not exercising:: other (see comments) (motivation and legs hurt if walks to long)  Patient can identify forms of physical activity.: no  Physical Activity/Exercise Skills Assessment Completed: : Yes  Assessment indicates:: Knowledge deficit, Instruction Needed  Area of need?: Yes    Home Blood Glucose Monitoring  Patient states that blood sugar is checked at home daily.: yes  Monitoring Method:: personal continuous glucose monitor  Personal CGM type:: Dexcom g7   What is  your current Time in Range?: 34%  What is your A1c Target?: not sure  Home Blood Glucose Monitoring Skills Assessment Completed: : Yes  Assessment indicates:: Knowledge deficit, Instruction Needed  Area of need?: Yes    Acute Complications  Have you ever had hypoglycemia (low BG 70 or less)?: no  Have you ever had hyperglycemia (high  or more)?: no  Have you ever had DKA?: no  Do you ever test for ketones?: no  Acute Complications Skills Assessment Completed: : No  Deffered due to:: Time  Area of need?: Deferred    Chronic Complications  Chronic Complications Skills Assessment Completed: : No  Deferred due to:: Time  Area of need?: Deferred      Assessment Summary and Plan    Based on today's diabetes care assessment, the following areas of need were identified:      Identified Areas of Need      Medication/Current Diabetes Treatment: Yes Discussed MOA, onset, side effects, dosage of  Lantus, will discuss rest of medication next visit    Lifestyle Coping/Support: No states lives alone but cousin jorge good support    Diabetes Disease Process/Treatment Options: Deferred   Nutrition/Healthy Eating: Yes  see care plan   Physical Activity/Exercise: Yes  see care plan    Home Blood Glucose Monitoring: Yes see care plan    Acute Complications: Deferred    Chronic Complications: Deferred       Today's interventions were provided through individual discussion, instruction, and written materials were provided.      Patient verbalized understanding of instruction and written materials.  Pt was able to return back demonstration of instructions today. Patient understood key points, needs reinforcement and further instruction.     Diabetes Self-Management Care Plan:    Today's Diabetes Self-Management Care Plan was developed with Helena's input. Helena has agreed to work toward the following goal(s) to improve his/her overall diabetes control.      Care Plan: Diabetes Management   Updates made since 11/7/2023 12:00 AM         Problem: Physical Activity and Exercise         Goal: Patient agrees to increase physical activity to a goal of 4-5  times per week for 10 minutes.    Start Date: 11/6/2024   Expected End Date: 12/4/2024   Priority: Medium   Barriers: Knowledge deficit; Physical Limitations   Note:    11/6/2024 She did some standing exercise with me in office for one min and was tired and wanted to sit down, we did some chair exercises and she will try to do a little of both at home and rest in between. She was smiling when I had her dance a little.         Task: Discussed role of physical activity on reducing insulin resistance and improvement in overall glycemic control. Completed 11/6/2024        Task: Discussed role of physical activity as it relates to weight loss Completed 11/6/2024        Task: Offered suggestions on how patient could increase their regular physical activity Completed 11/6/2024        Task: Reviewed blood glucose monitoring before, during and after exercise/activity Completed 11/6/2024        Problem: Healthy Eating         Long-Range Goal: Patient agrees to stop soda and juice.    Start Date: 11/6/2024   Expected End Date: 12/4/2024   Priority: High   Barriers: Knowledge deficit   Note:    11/6/2024 Reviewed food exchange list, snack ideas and meal planning tips and gave handouts. She cooks and likes tv dinners, suggested she add frozen veggies to meals. She likes canned veggies discussed low na canned food. She was drinking lots of soda and juice.  She will try diluting juice for now and when done stop and add sugar free sodas if needed.        Task: Provided visual examples using dry measuring cups, food models, and other familiar objects such as computer mouse, deck or cards, tennis ball etc. to help with visualization of portions. Completed 11/6/2024        Task: Explained how to count carbohydrates using the food label and the use of dry measuring cups for accurate carb counting. Completed 11/6/2024         Task: Discussed strategies for choosing healthier menu options when dining out. Completed 11/6/2024        Task: Recommended replacing beverages containing high sugar content with noncaloric/sugar free options and/or water. Completed 11/6/2024     Task: Provided Sample plate method and reviewed the use of the plate to estimate amounts of carbohydrate per meal. Completed 11/6/2024        Problem: Blood Glucose Self-Monitoring         Long-Range Goal: Patient agrees to use dexcom g7 trend arrows to learn how different foods effect her glucose.    Start Date: 11/6/2024   Expected End Date: 12/4/2024   Priority: Low   Barriers: Knowledge deficit   Note:    11/6/2024  Dexcom g7 CGM reviewed uses    Average glucose is 202 mg/dL  Hypoglycemia frequency 1 % with BG <70  Hyperglycemia frequency 65 % with BG >180  Time in range 34 %  14 Day GMI 82%  Recommendations: States she doesn't love dexcom beeps at 250. Did not know about trend arrow, discussed goal ranges .  Add activity, use plate method to balance meals, watch portion control, stop soda and juice.   See glucose log attached from dexcom readings.          Follow Up Plan     Follow up in about 4 weeks (around 12/4/2024) for continue nutrition/review cgm and goals, discuss patho, acute/chronic complications. .Appt made with pt today for 12/4/2024 at 11:00. She will see if her ride can bring her and call to change appt if needed.     Today's care plan and follow up schedule was discussed with patient.  Helena verbalized understanding of the care plan, goals, and agrees to follow up plan.        The patient was encouraged to communicate with his/her health care provider/physician and care team regarding his/her condition(s) and treatment.  I provided the patient with my contact information today and encouraged to contact me via phone or [facility]'s Patient Portal as needed.     Length of Visit   Total Time: 60 Minutes

## 2024-11-14 ENCOUNTER — OFFICE VISIT (OUTPATIENT)
Dept: FAMILY MEDICINE | Facility: CLINIC | Age: 54
End: 2024-11-14
Payer: MEDICAID

## 2024-11-14 VITALS
HEIGHT: 62 IN | SYSTOLIC BLOOD PRESSURE: 139 MMHG | WEIGHT: 213.38 LBS | DIASTOLIC BLOOD PRESSURE: 86 MMHG | BODY MASS INDEX: 39.27 KG/M2 | HEART RATE: 90 BPM | RESPIRATION RATE: 20 BRPM | OXYGEN SATURATION: 99 % | TEMPERATURE: 98 F

## 2024-11-14 DIAGNOSIS — K21.9 GASTROESOPHAGEAL REFLUX DISEASE, UNSPECIFIED WHETHER ESOPHAGITIS PRESENT: ICD-10-CM

## 2024-11-14 DIAGNOSIS — E11.65 TYPE 2 DIABETES MELLITUS WITH HYPERGLYCEMIA, WITH LONG-TERM CURRENT USE OF INSULIN: Primary | ICD-10-CM

## 2024-11-14 DIAGNOSIS — Z79.4 TYPE 2 DIABETES MELLITUS WITH HYPERGLYCEMIA, WITH LONG-TERM CURRENT USE OF INSULIN: Primary | ICD-10-CM

## 2024-11-14 PROCEDURE — 99214 OFFICE O/P EST MOD 30 MIN: CPT | Mod: ,,,

## 2024-11-14 RX ORDER — PANTOPRAZOLE SODIUM 40 MG/1
40 TABLET, DELAYED RELEASE ORAL 2 TIMES DAILY
Qty: 180 TABLET | Refills: 0 | Status: SHIPPED | OUTPATIENT
Start: 2024-11-14 | End: 2025-02-12

## 2024-11-14 NOTE — PROGRESS NOTES
Patient ID: [id number]     Chief Complaint: Diabetes (2 week check up) and Abdominal Pain (RUQ pain occurring last 3 nights/States is last 10-15 minutes before she lays down/Having loose stools daily)    HPI:     Helena Vazquez, a 54-year-old woman, is here today for a follow-up on her diabetes and to adjust her insulin. We reviewed her blood glucose log, which shows improvement, although her levels mostly remain in the 200s. She mentions experiencing some left upper quadrant pain after meals and finds relief by sitting up after lying down. She has no other concerns to report today.    Past Medical History:   Diagnosis Date    Asthma     Bipolar disorder, unspecified     Diabetes mellitus type 2 in obese     DUB (dysfunctional uterine bleeding)     Edema leg     Family history of breast cancer in sister     x2    Hepatic steatosis     History of infection due to human papilloma virus (HPV)     HLD (hyperlipidemia)     HTN (hypertension)     Hypercholesterolemia     Hyperlipidemia due to type 2 diabetes mellitus 2022    Hypertriglyceridemia     WALTER (iron deficiency anemia)     Microcytic anemia     Morbid obesity     Schizoaffective disorder     Type 2 diabetes mellitus     Type 2 diabetes mellitus with hyperglycemia     Urge incontinence of urine     Vision blurred         Past Surgical History:   Procedure Laterality Date     SECTION  1994     SECTION  1991     SECTION WITH TUBAL LIGATION  1998    CHOLECYSTECTOMY      ESOPHAGOGASTRODUODENOSCOPY N/A 2024    Procedure: EGD (ESOPHAGOGASTRODUODENOSCOPY);  Surgeon: Sandra De La Fuente MD;  Location: [facility];  Service: General;  Laterality: N/A;    HERNIA REPAIR      TUBAL LIGATION          Social History     Socioeconomic History    Marital status:    Tobacco Use    Smoking status: Former    Smokeless tobacco: Never   Substance and Sexual Activity    Alcohol use: Never    Drug use: Never    Sexual  activity: Yes     Partners: Male     Birth control/protection: None     Social Drivers of Health     Financial Resource Strain: High Risk (7/15/2024)    Overall Financial Resource Strain (CARDIA)     Difficulty of Paying Living Expenses: Hard   Food Insecurity: No Food Insecurity (7/15/2024)    Hunger Vital Sign     Worried About Running Out of Food in the Last Year: Never true     Ran Out of Food in the Last Year: Never true   Transportation Needs: No Transportation Needs (6/14/2023)    PRAPARE - Transportation     Lack of Transportation (Medical): No     Lack of Transportation (Non-Medical): No   Physical Activity: Unknown (7/15/2024)    Exercise Vital Sign     Days of Exercise per Week: 0 days   Stress: No Stress Concern Present (7/15/2024)    [employer] - Occupational Stress Questionnaire     Feeling of Stress : Not at all   Housing Stability: Low Risk  (6/14/2023)    Housing Stability Vital Sign     Unable to Pay for Housing in the Last Year: No     Number of Places Lived in the Last Year: 1     Unstable Housing in the Last Year: No        Current Outpatient Medications   Medication Instructions    albuterol-ipratropium (DUO-NEB) 2.5 mg-0.5 mg/3 mL nebulizer solution 3 mLs, Nebulization, Every 6 hours PRN, Rescue    alcohol antiseptic pads (ALCOHOL SWABS TOP)   alcohol swabs, See Instructions, use alcohol swab on injection site prior to injection, # 1 box(es), 3 Refill(s), Pharmacy: [facility], 155, cm, Height/Length Dosing, 02/09/22 9:59:00 CST, 116.4, kg, Weight Dosing, 02/09/22 9:59...    atorvastatin (LIPITOR) 80 mg, Oral, Daily    blood sugar diagnostic (ONETOUCH ULTRA TEST) Strp 1 each, Subcutaneous, 3 times daily    blood sugar diagnostic Strp 1 each, Misc.(Non-Drug; Combo Route), Every 10 days    blood-glucose sensor (DEXCOM G7 SENSOR) Megan 1 each, Misc.(Non-Drug; Combo Route), Every 10 days    fenofibrate (TRICOR) 54 mg, Oral, Daily    glipiZIDE  "(GLUCOTROL) 10 mg, Oral, 2 times daily    hydroCHLOROthiazide (HYDRODIURIL) 25 mg, Oral    JARDIANCE 25 mg tablet TAKE ONE TABLET BY MOUTH EVERY MORNING    lancets (ONETOUCH DELICA PLUS LANCET) 33 gauge Misc 1 lancet , Subcutaneous, 4 times daily    LANTUS SOLOSTAR U-100 INSULIN 12 Units, Subcutaneous, Nightly    losartan (COZAAR) 100 mg, Oral, Daily    oxybutynin (DITROPAN) 5 mg, Oral, 3 times daily    OZEMPIC 1 mg, Subcutaneous, Every 7 days    pantoprazole (PROTONIX) 40 mg, Oral, 2 times daily, For acid reflux    pen needle, diabetic 31 gauge x 3/16" Ndle 1 Needle, Misc.(Non-Drug; Combo Route), Daily    pioglitazone (ACTOS) 30 mg, Oral, Daily       Review of patient's allergies indicates:  No Known Allergies     Patient Care Team:  Fei Smith DO as PCP - General (Family Medicine)  Care, Jorge L Eye as Consulting Physician (Optometry)  Sandra De La Fuente MD as Consulting Physician (General Surgery)  Earlene Stubbs RN as Diabetes Educator (Diabetes)     Subjective:     Review of Systems    12 point review of systems conducted, negative except as stated in the history of present illness. See HPI for details.    Objective:     Visit Vitals  /86 (BP Location: Left arm, Patient Position: Sitting)   Pulse 90   Temp 97.8 °F (36.6 °C)   Resp 20   Ht 5' 2" (1.575 m)   Wt 96.8 kg (213 lb 6.4 oz)   SpO2 99%   BMI 39.03 kg/m²       Physical Exam  Vitals and nursing note reviewed.   Constitutional:       General: She is not in acute distress.     Appearance: She is not ill-appearing.   HENT:      Head: Normocephalic and atraumatic.      Mouth/Throat:      Mouth: Mucous membranes are moist.      Pharynx: Oropharynx is clear.   Eyes:      General: No scleral icterus.     Extraocular Movements: Extraocular movements intact.      Conjunctiva/sclera: Conjunctivae normal.      Pupils: Pupils are equal, round, and reactive to light.   Neck:      Vascular: No carotid bruit.   Cardiovascular:      Rate and Rhythm: " Normal rate and regular rhythm.      Heart sounds: No murmur heard.     No friction rub. No gallop.   Pulmonary:      Effort: Pulmonary effort is normal. No respiratory distress.      Breath sounds: Normal breath sounds. No wheezing, rhonchi or rales.   Abdominal:      General: Abdomen is flat. Bowel sounds are normal. There is no distension.      Palpations: Abdomen is soft. There is no mass.      Tenderness: There is abdominal tenderness in the epigastric area.   Musculoskeletal:         General: Normal range of motion.      Cervical back: Normal range of motion and neck supple.   Skin:     General: Skin is warm and dry.   Neurological:      General: No focal deficit present.      Mental Status: She is alert.   Psychiatric:         Mood and Affect: Mood normal.       Labs Reviewed:     Chemistry:  Lab Results   Component Value Date     10/29/2024    K 4.0 10/29/2024    BUN 10.0 10/29/2024    CREATININE 0.67 10/29/2024    EGFRNORACEVR >60 10/29/2024    GLUCOSE 189 (H) 10/29/2024    CALCIUM 9.9 10/29/2024    ALKPHOS 116 10/29/2024    LABPROT 7.4 10/29/2024    ALBUMIN 3.5 10/29/2024    BILIDIR 0.2 02/08/2022    IBILI 0.30 02/08/2022    AST 13 10/29/2024    ALT 18 10/29/2024    MG 2.00 04/12/2021    TSH 2.7489 02/08/2022        Lab Results   Component Value Date    HGBA1C 9.5 (H) 10/29/2024        Hematology:  Lab Results   Component Value Date    WBC 10.44 07/02/2024    HGB 15.0 07/02/2024    HCT 46.8 07/02/2024     07/02/2024       Lipid Panel:  Lab Results   Component Value Date    CHOL 194 06/20/2024    HDL 39 06/20/2024    .00 06/20/2024    TRIG 189 (H) 06/20/2024    TOTALCHOLEST 5 06/20/2024        Urine:  Lab Results   Component Value Date    APPEARANCEUA Turbid (A) 04/16/2024    SGUA >=1.030 04/16/2024    PROTEINUA Trace (A) 04/16/2024    KETONESUA Trace (A) 04/16/2024    LEUKOCYTESUR Negative 04/16/2024    RBCUA 0-2 04/16/2024    WBCUA 0-2 04/16/2024    BACTERIA Few (A) 04/16/2024     DONAVAN 69.3 03/19/2024      Assessment:       ICD-10-CM ICD-9-CM   1. Type 2 diabetes mellitus with hyperglycemia, with long-term current use of insulin  E11.65 250.00    Z79.4 790.29     V58.67   2. Gastroesophageal reflux disease, unspecified whether esophagitis present  K21.9 530.81     Plan:     1. Type 2 diabetes mellitus with hyperglycemia, with long-term current use of insulin  Assessment & Plan:  Continue Ozempic to 1 mg weekly.   Increase Lantus to 22 units at night.   If blood sugar is > 200, increase Lantus to 24 units nightly.   If blood sugar < 180, continue Lantus 20 units nightly.      RTC in two weeks with Dexcom reader and blood glucose logs.   Notify clinic ASAP if Dexcom reader is not working, if you experience hypoglycemia, or if you have any questions regarding your treatment plan.   Continue treatment regimen with home health.       2. Gastroesophageal reflux disease, unspecified whether esophagitis present  Assessment & Plan:  Increase Protonix to 40 mg twice daily.   Avoid spicy, acidic, fried foods and alcohol.  Eat 2-3 hours before going to bed.  Avoid tight clothing, chew food thoroughly.  Reduce caffeine intake, avoid soda.        Orders:  -     pantoprazole (PROTONIX) 40 MG tablet; Take 1 tablet (40 mg total) by mouth 2 (two) times daily. For acid reflux  Dispense: 180 tablet; Refill: 0      Follow up in about 4 weeks (around 12/12/2024). In addition to their scheduled follow up, the patient has also been instructed to follow up on as needed basis.     Future Appointments   Date Time Provider Department Center   12/4/2024 11:00 AM Earlene Stubbs [place] [facility]    12/10/2024 10:20 AM Smiley Natarajan NP [facility]   4/28/2025  3:00 PM Smiley Natarajan NP [facility] Ka'Ryn Franklin, NP

## 2024-11-15 PROBLEM — K21.9 GERD (GASTROESOPHAGEAL REFLUX DISEASE): Status: ACTIVE | Noted: 2024-08-28

## 2024-11-16 NOTE — ASSESSMENT & PLAN NOTE
Continue Ozempic to 1 mg weekly.   Increase Lantus to 22 units at night.   If blood sugar is > 200, increase Lantus to 24 units nightly.   If blood sugar < 180, continue Lantus 20 units nightly.      RTC in two weeks with Dexcom reader and blood glucose logs.   Notify clinic ASAP if Dexcom reader is not working, if you experience hypoglycemia, or if you have any questions regarding your treatment plan.   Continue treatment regimen with home health.

## 2024-11-16 NOTE — ASSESSMENT & PLAN NOTE
Increase Protonix to 40 mg twice daily.   Avoid spicy, acidic, fried foods and alcohol.  Eat 2-3 hours before going to bed.  Avoid tight clothing, chew food thoroughly.  Reduce caffeine intake, avoid soda.

## 2024-11-19 ENCOUNTER — TELEPHONE (OUTPATIENT)
Dept: FAMILY MEDICINE | Facility: CLINIC | Age: 54
End: 2024-11-19
Payer: MEDICAID

## 2024-11-19 ENCOUNTER — HOSPITAL ENCOUNTER (EMERGENCY)
Facility: HOSPITAL | Age: 54
Discharge: HOME | End: 2024-11-19
Attending: STUDENT IN AN ORGANIZED HEALTH CARE EDUCATION/TRAINING PROGRAM
Payer: MEDICAID

## 2024-11-19 VITALS
DIASTOLIC BLOOD PRESSURE: 95 MMHG | HEIGHT: 62 IN | OXYGEN SATURATION: 100 % | RESPIRATION RATE: 20 BRPM | TEMPERATURE: 97 F | WEIGHT: 214 LBS | SYSTOLIC BLOOD PRESSURE: 166 MMHG | HEART RATE: 81 BPM | BODY MASS INDEX: 39.38 KG/M2

## 2024-11-19 DIAGNOSIS — G43.809 OTHER MIGRAINE WITHOUT STATUS MIGRAINOSUS, NOT INTRACTABLE: Primary | ICD-10-CM

## 2024-11-19 DIAGNOSIS — I10 HYPERTENSION: ICD-10-CM

## 2024-11-19 LAB
OHS QRS DURATION: 94 MS
OHS QTC CALCULATION: 451 MS

## 2024-11-19 PROCEDURE — 93005 ELECTROCARDIOGRAM TRACING: CPT

## 2024-11-19 PROCEDURE — 25000003 PHARM REV CODE 250: Performed by: STUDENT IN AN ORGANIZED HEALTH CARE EDUCATION/TRAINING PROGRAM

## 2024-11-19 PROCEDURE — 99283 EMERGENCY DEPT VISIT LOW MDM: CPT | Mod: 25

## 2024-11-19 RX ORDER — ACETAMINOPHEN 500 MG
1000 TABLET ORAL
Status: COMPLETED | OUTPATIENT
Start: 2024-11-19 | End: 2024-11-19

## 2024-11-19 RX ADMIN — ACETAMINOPHEN 1000 MG: 500 TABLET, FILM COATED ORAL at 12:11

## 2024-11-19 NOTE — ED PROVIDER NOTES
Encounter Date: 11/19/2024       History     Chief Complaint   Patient presents with    Headache     Headache started today, homehealth went and her blood pressure is high. Sent to hospital per [facility] 9/10     Patient is a 54 old white female history of hypertension, bipolar disorder, diabetes, hyperlipidemia, insomnia, GERD presented to the ER today due to elevated blood pressure at home.  Health nurse was reportedly checking her blood pressure noticed that she was hypertensive although patient was asymptomatic. PCP was notified and patient was advised to present to the ER as a result.  Upon arrival she states that she had a headache although she did not take anything for it.  She denies any red flag symptoms of the headache stating that it did not have an number clamped onset or vision changes.  She states that this has her typical symptoms with her typical migraine.  She states she does not need anything at this time and denies any diplopia, dysarthria, dysphagia, focal deficits, changes in sensation, vision changes, chest pain, shortness of breath or abdominal pain.  She does report a fall about a week ago she states occurs from time to time but denies any injuries or head trauma.      Review of patient's allergies indicates:  No Known Allergies  Past Medical History:   Diagnosis Date    Asthma     Bipolar disorder, unspecified     Diabetes mellitus type 2 in obese     DUB (dysfunctional uterine bleeding)     Edema leg     Family history of breast cancer in sister     x2    Hepatic steatosis     History of infection due to human papilloma virus (HPV)     HLD (hyperlipidemia)     HTN (hypertension)     Hypercholesterolemia     Hyperlipidemia due to type 2 diabetes mellitus 08/18/2022    Hypertriglyceridemia     WALTER (iron deficiency anemia)     Microcytic anemia     Morbid obesity     Schizoaffective disorder     Type 2 diabetes mellitus     Type 2 diabetes mellitus with hyperglycemia     Urge incontinence of  [facility]    Medicine Progress Note - Hospitalist Service       Date of Admission:  2/17/2021    Assessment & Plan       Jose Vazquez is a 74-year-old male with a medical history of recent stroke who presented to the emergency department on 2/17/2021 after he was found in the community down after a fall.  Further evaluation has suggested some impairment and a notable SLUMS core of 6/30.  Psych eval during this stay felt he is not decisional for complex medical decisions.  He is currently on continuance for dismissal from legal standpoint and has appointed his brother and medical agent. Unfortunately cannot leave as no payer source (no financial POA appointed so facilities reluctant to take as likely will only be authorized for short stay). Unable to appoint a financial POA because he has not 's license or appropriate identification.   Patient is currently awaiting disposition.     On Court Hold       Fall, likely in the setting of physical deconditioning and recent CVA:  Patient not able to give any significant collateral history.  Based on his weakness and PT eval's, likely this was due to physical deconditioning. Overall improve at this time, continue PT and OT.   -TTE without any valvular abnormalities, telemetry NSR, laboratory work-up negative including TSH, CK, LFTs, troponin.  - tele NSR, discontinued earlier in stay.  - working with therapy  - Overall remain stable at this time.      Concern for cognitive impairment vs Acute encephalopathy (post stroke vs TBI vs other)  Concern for vulnerable adult  Patient previously living independently prior to his recent stroke.  After that he was discharged to a TCU which he left AMA from.  He does not recall the events leading to his AMA discharge.  Although he is oriented at times, significant concerns related to his decision-making ability. Likely progressive dementia with recent acute stroke worsening the situation.   This is a  difficult situation with patient deemed not decisional for complex decisions. Eventually were able to identify a brother, Jaziel Vazquez, who Jose appointed as medical decision maker on 3/9/21.   Does not have financial POA appointed at this point.  - OT for cognitive eval (SLUMS 6/30 on Feb 19 and 10/30 on March 1)  - Social work consult, they will file vulnerable adult  - brother Jaziel appointed as medical decision maker on 3/9/21  - plan to discharge to TCU  - Psych consult on 3/1 opted to hold patient and pursue commitment and subsequent guardianship  - on continuance for dismissal as of 3/11/21. Patient can discharge as soon as he has accepting facility    Hematuria: resolved.   Noted on urinalysis for many years  -Follow-up with outpatient urology discharge     Concern for UTI earlier in stay:  -Patient denies urinary symptoms  -Urine culture with no organisms  -Discontinued ceftriaxone earlier in stay.       HTN:  -was Holding PTA amlodipine as BP controlled without it currently.    Now blood pressure running slightly on higher side, 139-144 systolic and  diastolic.  He was 10 mg of amlodipine at home, restarted amlodipine at 5 mg daily on 3/6  Blood pressure better control now.      Subacute left basal ganglia stroke with possible petechial hemorrhage within the above infarct  Chronic-appearing infarct in the inferior medial right cerebellum  Multiple probable chronic microhemorrhages within both cerebral hemispheres and cerebellum  Right carotid stenosis  Presented on 1/14/2021 with speech/language impairment and gait disturbance.  On review of chart patient was then offered TPA, he had declined.   A1c 5.6%, LDL 93. TTE - EF normal, normal sized atria  -Restarted PTA aspirin 325 mg oral daily  -outpatient f/u with neurology for carotid stenosis  - Continue Lipitor 40 mg daily and amlodipine 5 mg daily at this time.       THADDEUS, pre-renal, resolved  During admission last month noted to have a creatinine of  urine     Vision blurred      Past Surgical History:   Procedure Laterality Date     SECTION  1994     SECTION  1991     SECTION WITH TUBAL LIGATION  1998    CHOLECYSTECTOMY      ESOPHAGOGASTRODUODENOSCOPY N/A 2024    Procedure: EGD (ESOPHAGOGASTRODUODENOSCOPY);  Surgeon: Sandra De La Fuente MD;  [facility]: [facility];  Service: General;  Laterality: N/A;    HERNIA REPAIR      TUBAL LIGATION       Family History   Problem Relation Name Age of Onset    Heart attack Mother Shauna stephanie     Diabetes Mellitus Mother Shauna stephanie     Coronary artery disease Mother Shauna stephanie     Hypertension Mother Shauna stephanie     Asthma Mother Shauna stephanie     Depression Mother Shauna stephanie     Learning disabilities Mother Shauna stephanie     Lung cancer Father Jayson champagne     Diabetes Mellitus Father Jayson champagne     Diabetes Father Jayson champagne     Breast cancer Sister      Breast cancer Sister       Social History     Tobacco Use    Smoking status: Former    Smokeless tobacco: Never   Substance Use Topics    Alcohol use: Never    Drug use: Never     Review of Systems   Constitutional:  Negative for chills, fatigue and fever.   HENT:  Negative for congestion, sore throat and trouble swallowing.    Eyes:  Negative for pain and visual disturbance.   Respiratory:  Negative for cough, shortness of breath and wheezing.    Cardiovascular:  Negative for chest pain and palpitations.   Gastrointestinal:  Negative for abdominal pain, blood in stool, constipation, diarrhea, nausea and vomiting.   Genitourinary:  Negative for dysuria and hematuria.   Musculoskeletal:  Negative for back pain and myalgias.   Skin:  Negative for rash and wound.   Neurological:  Positive for headaches. Negative for dizziness, tremors, seizures, syncope, facial asymmetry, speech difficulty, weakness, light-headedness and numbness.   Psychiatric/Behavioral:  Negative for confusion. The patient is not  1.41 that improved to 1.01.  Have again presents with creatinine of 1.22.  Avoid nephrotoxic medications.   -Last CR WNL.  Eating, taking in fluids.      Chronic obstructive pulmonary disease history  He smoked for over 40 years, a pack a day, but quit a few years ago.  Not requiring any inhalers.  Monitor        Diet: Combination Diet Low Saturated Fat Na <2400mg Diet  Advance Diet as Tolerated    DVT Prophylaxis: Ambulate every shift  Shen Catheter: not present  Code Status: Full Code           Disposition Plan   Expected discharge: patient is medically stable, but awaiting safe disposition at this time   Entered: Karina Thompson MD 03/14/2021, 12:10 PM       The patient's care was discussed with the Bedside Nurse,patient today      Time Spent on this Encounter   I spent 25 minutes on the unit/floor managing the care of Jose Vazquez. Over 50% of my time was spent on the following:   - Counseling the patient and/or family regarding: recommended follow-up and medical compliance  - Coordination of care with the: care coordinator/ and nurse    MD Karina Crawford MD  Hospitalist Service  [facility]      ______________________________________________________________________    Interval History   Resting comfortably in bed.  Denies any complaints.  Peripheral worker will be involved awaiting placement.  No acute issues since yesterday    ROS otherwise negative.     Data reviewed today: I reviewed all medications, new labs and imaging results over the last 24 hours. I personally reviewed no images or EKG's today.    Physical Exam   Vital Signs: Temp: 98  F (36.7  C) Temp src: Oral BP: 109/74 Pulse: 93   Resp: 16 SpO2: 95 % O2 Device: None (Room air)    Weight: 163 lbs 11.2 oz  GEN:  Alert, oriented , appears comfortable, no distress   HEENT:  Normocephalic/atraumatic, no scleral icterus, no nasal discharge, mouth moist.  EXT:  No edema.  No cyanosis.  No acute joint  nervous/anxious.        Physical Exam     Initial Vitals [11/19/24 1231]   BP Pulse Resp Temp SpO2   (!) 176/95 89 20 96.8 °F (36 °C) 100 %      MAP       --         Physical Exam    Nursing note and vitals reviewed.  Constitutional: She appears well-developed and well-nourished. She is not diaphoretic. No distress.   HENT:   Head: Normocephalic.   Right Ear: External ear normal.   Left Ear: External ear normal.   Nose: Nose normal.   Eyes: Conjunctivae and EOM are normal. Right eye exhibits no discharge. Left eye exhibits no discharge. No scleral icterus.   Neck:   Normal range of motion.  Cardiovascular:  Normal rate, regular rhythm and normal heart sounds.     Exam reveals no gallop and no friction rub.       No murmur heard.  Pulmonary/Chest: Breath sounds normal. No stridor. No respiratory distress. She has no wheezes. She has no rhonchi. She has no rales.   Abdominal: Abdomen is soft. She exhibits no distension. There is no abdominal tenderness. There is no rebound and no guarding.   Musculoskeletal:         General: Normal range of motion.      Cervical back: Normal range of motion.     Neurological: She is alert and oriented to person, place, and time. She has normal strength. No cranial nerve deficit or sensory deficit. GCS score is 15. GCS eye subscore is 4. GCS verbal subscore is 5. GCS motor subscore is 6.   CN II-XII intact bilaterally, PERRLA, EOMI, AO, no facial asymmetry noted, no abnormalities of vision loss or peripheral vision loss, strength 5/5 x 4 extremities, sensation intact throughout, no focal neurological deficits noted on exam.  Negative Pronator drift bilaterally.       Skin: Skin is warm. No rash noted. No erythema.   Psychiatric: She has a normal mood and affect. Her behavior is normal.         ED Course   Procedures  Labs Reviewed - No data to display  EKG Readings: (Independently Interpreted)   Initial Reading: No STEMI. Rhythm: Normal Sinus Rhythm. Heart Rate: 75. ST Segments: Normal  ST Segments. T Waves: Normal. Axis: Normal.     ECG Results              EKG 12-lead (In process)        Collection Time Result Time QRS Duration OHS QTC Calculation    11/19/24 12:46:44 11/19/24 13:06:38 94 451                     In process by Interface, Lab In [facility] (11/19/24 13:06:41)                   Narrative:    Test Reason : I10,    Vent. Rate :  75 BPM     Atrial Rate :  75 BPM     P-R Int : 192 ms          QRS Dur :  94 ms      QT Int : 404 ms       P-R-T Axes :  47  42  41 degrees    QTcB Int : 451 ms    Normal sinus rhythm  Normal ECG  When compared with ECG of 02-Jul-2024 09:57,  Left posterior fascicular block is no longer Present    Referred By: BASILIO CHARLES           Confirmed By:                                   Imaging Results    None          Medications   acetaminophen tablet 1,000 mg (1,000 mg Oral Given 11/19/24 1240)     Medical Decision Making  Differentials:  Uncontrolled hypertension, hypertensive urgency, hypertensive emergency, CVA/TIA   Historian is the patient, EMS   54-year-old well-appearing female presents to the ER today due to isolated elevated blood pressure.  Neuro exam is completely unremarkable.  Tylenol given per patient request for headache that she had earlier.  Blood pressure on arrival 170s over 90s.  Headache denies any red flag symptoms and states symptoms today are similar to her previous headaches.  Tylenol given per patient request and headache resolved per the patient.  Blood pressure downtrend it as a result down to 150s over 90s.  Offered patient lab work further evaluation which he states it was not necessary and she wants to call her cousin to come and pick her up.  ER return precautions were discussed close follow up with the PCP was recommended and all questions were answered in layman's terms.    Amount and/or Complexity of Data Reviewed  ECG/medicine tests: ordered and independent interpretation performed. Decision-making details documented in ED  synovitis noted.  SKIN:  Dry to touch, no exanthems noted in the visualized areas.  Chest: good air entry bilaterally, no wheezing or crackles.   CVS: S1 and S2 normal.   Abd: soft and non tender.     Data   No lab results found in last 7 days.    No results found for this or any previous visit (from the past 24 hour(s)).  Medications       amLODIPine  5 mg Oral Daily     aspirin  325 mg Oral or NG Tube Daily     atorvastatin  40 mg Oral QPM      Course.    Risk  OTC drugs.                                      Clinical Impression:  Final diagnoses:  [I10] Hypertension  [G43.809] Other migraine without status migrainosus, not intractable (Primary)          ED Disposition Condition    Discharge Stable          ED Prescriptions    None       Follow-up Information       Follow up With Specialties Details Why Contact Info    [facility] - Emergency Dept Emergency Medicine  If symptoms worsen [address] [facility] [id number] [phone]    Fei Smith, DO Family Medicine Schedule an appointment as soon as possible for a visit   [address] [facility] [address]  [phone]               Du Villa MD  11/19/24 3308

## 2024-11-20 RX ORDER — ZOLPIDEM TARTRATE 5 MG/1
5 TABLET ORAL NIGHTLY PRN
COMMUNITY

## 2024-11-20 RX ORDER — FAMOTIDINE 20 MG/1
20 TABLET, FILM COATED ORAL 2 TIMES DAILY
COMMUNITY

## 2024-11-25 DIAGNOSIS — N39.41 URGE INCONTINENCE OF URINE: Chronic | ICD-10-CM

## 2024-11-25 DIAGNOSIS — Z79.4 TYPE 2 DIABETES MELLITUS WITH HYPERGLYCEMIA, WITH LONG-TERM CURRENT USE OF INSULIN: ICD-10-CM

## 2024-11-25 DIAGNOSIS — E11.65 TYPE 2 DIABETES MELLITUS WITH HYPERGLYCEMIA, WITH LONG-TERM CURRENT USE OF INSULIN: ICD-10-CM

## 2024-11-25 RX ORDER — INSULIN GLARGINE 100 [IU]/ML
22 INJECTION, SOLUTION SUBCUTANEOUS NIGHTLY
Qty: 6.6 ML | Refills: 0 | Status: SHIPPED | OUTPATIENT
Start: 2024-11-25 | End: 2024-12-25

## 2024-11-25 RX ORDER — OXYBUTYNIN CHLORIDE 5 MG/1
5 TABLET ORAL 3 TIMES DAILY
Qty: 90 TABLET | Refills: 3 | Status: SHIPPED | OUTPATIENT
Start: 2024-11-25

## 2024-12-03 ENCOUNTER — OFFICE VISIT (OUTPATIENT)
Dept: FAMILY MEDICINE | Facility: CLINIC | Age: 54
End: 2024-12-03
Payer: MEDICAID

## 2024-12-03 VITALS
TEMPERATURE: 98 F | WEIGHT: 214 LBS | SYSTOLIC BLOOD PRESSURE: 143 MMHG | BODY MASS INDEX: 39.38 KG/M2 | HEART RATE: 61 BPM | RESPIRATION RATE: 20 BRPM | HEIGHT: 62 IN | DIASTOLIC BLOOD PRESSURE: 93 MMHG | OXYGEN SATURATION: 99 %

## 2024-12-03 DIAGNOSIS — Z79.4 TYPE 2 DIABETES MELLITUS WITH HYPERGLYCEMIA, WITH LONG-TERM CURRENT USE OF INSULIN: ICD-10-CM

## 2024-12-03 DIAGNOSIS — I10 PRIMARY HYPERTENSION: Primary | Chronic | ICD-10-CM

## 2024-12-03 DIAGNOSIS — E11.65 TYPE 2 DIABETES MELLITUS WITH HYPERGLYCEMIA, WITH LONG-TERM CURRENT USE OF INSULIN: ICD-10-CM

## 2024-12-03 PROCEDURE — 99214 OFFICE O/P EST MOD 30 MIN: CPT | Mod: ,,,

## 2024-12-03 PROCEDURE — 3079F DIAST BP 80-89 MM HG: CPT | Mod: CPTII,,,

## 2024-12-03 PROCEDURE — 1159F MED LIST DOCD IN RCRD: CPT | Mod: CPTII,,,

## 2024-12-03 PROCEDURE — 3008F BODY MASS INDEX DOCD: CPT | Mod: CPTII,,,

## 2024-12-03 PROCEDURE — 1160F RVW MEDS BY RX/DR IN RCRD: CPT | Mod: CPTII,,,

## 2024-12-03 PROCEDURE — 4010F ACE/ARB THERAPY RXD/TAKEN: CPT | Mod: CPTII,,,

## 2024-12-03 PROCEDURE — 3046F HEMOGLOBIN A1C LEVEL >9.0%: CPT | Mod: CPTII,,,

## 2024-12-03 PROCEDURE — 3066F NEPHROPATHY DOC TX: CPT | Mod: CPTII,,,

## 2024-12-03 PROCEDURE — 3077F SYST BP >= 140 MM HG: CPT | Mod: CPTII,,,

## 2024-12-03 PROCEDURE — 3061F NEG MICROALBUMINURIA REV: CPT | Mod: CPTII,,,

## 2024-12-03 RX ORDER — AMLODIPINE BESYLATE 5 MG/1
5 TABLET ORAL DAILY
Qty: 30 TABLET | Refills: 0 | Status: SHIPPED | OUTPATIENT
Start: 2024-12-03 | End: 2025-01-02

## 2024-12-03 NOTE — PATIENT INSTRUCTIONS
If blood sugar is greater than 200, increase Lantus to 28 units at night.    If blood sugar is less than 180, continue 26 units at night.    Return to clinic in 1 month.     Please call the office you start to have any problems with your blood pressure or blood sugar readings.

## 2024-12-04 ENCOUNTER — CLINICAL SUPPORT (OUTPATIENT)
Facility: CLINIC | Age: 54
End: 2024-12-04
Payer: MEDICAID

## 2024-12-04 VITALS — BODY MASS INDEX: 39.78 KG/M2 | HEIGHT: 62 IN | WEIGHT: 216.19 LBS

## 2024-12-04 DIAGNOSIS — E11.65 TYPE 2 DIABETES MELLITUS WITH HYPERGLYCEMIA, WITH LONG-TERM CURRENT USE OF INSULIN: Primary | ICD-10-CM

## 2024-12-04 DIAGNOSIS — Z79.4 TYPE 2 DIABETES MELLITUS WITH HYPERGLYCEMIA, WITH LONG-TERM CURRENT USE OF INSULIN: Primary | ICD-10-CM

## 2024-12-04 NOTE — ASSESSMENT & PLAN NOTE
Continue Ozempic to 1 mg weekly.   Increase Lantus to 28 units at night.   If blood sugar is > 200, increase Lantus to 28 units nightly.   If blood sugar < 180, continue Lantus 26 units nightly.      Notify clinic ASAP if Dexcom reader is not working, if you experience hypoglycemia, or if you have any questions regarding your treatment plan.   Continue treatment regimen with home health.

## 2024-12-04 NOTE — PROGRESS NOTES
"Diabetes Care Specialist Follow-up Note  Author: Earlene Stubbs RN  Date: 12/4/2024    Intake    Program Intake  Reason for Diabetes Program Visit:: Intervention  Type of Intervention:: Individual  Individual: Education  Education: Nutrition and Meal Planning, Pattern Management  Current diabetes risk level:: moderate  In the last month, have you used the ER or been admitted to the hospital: Yes  Was the ER or hospital admission related to diabetes?: No  Permission to speak with others about care:: yes    Oral Medication Type/Dose: glipizide 10mg bid, jardiance 25mg daily, actos 30mg daily  DM Injectables Type/Dose: Ozempic 1mg every monday  Method of insulin delivery?: Injections  Injection Type: Pens  Pen Type/Dose: Lantus 28 units Qhs    Continuous Glucose Monitoring  Patient has CGM: Yes  Personal CGM type:: Dexcom g7  GMI Date: 12/04/24  GMI Value:  (n/a  unable to get gmi)    Lab Results   Component Value Date    HGBA1C 9.5 (H) 10/29/2024       Weight: 98.1 kg (216 lb 3.2 oz)   Height: 5' 2" (157.5 cm)   Body mass index is 39.54 kg/m².  No Wt change since last visit on 11/6/24    Diabetes Self-Management Skills Assessment    Diabetes Disease Process/Treatment Options  When were you diagnosed?: about at age 51  Is patient aware of what causes diabetes?: No  Does patient understand the pathophysiology of diabetes?: No  Diabetes Disease Process/Treatment Options: Skills Assessment Completed: Yes  Assessment indicates:: Knowledge deficit, Instruction Needed  Area of need?: Yes    Nutrition/Healthy Eating  Meal Plan 24 Hour Recall - Lunch: ham sandwich    Home Blood Glucose Monitoring  Personal CGM type:: Dexcom g7    Acute Complications  Acute Complications Skills Assessment Completed: : No  Deffered due to:: Time  Area of need?: Deferred    Chronic Complications  Chronic Complications Skills Assessment Completed: : No  Deferred due to:: Time  Area of need?: Deferred    During today's follow-up visit,  the " following areas required further assessment and content was provided/reviewed.    Based on today's diabetes care assessment, the following areas of need were identified:      Identified Areas of Need      Medication/Current Diabetes Treatment:  Yes Discussed last visit 11/6/24   Lifestyle Coping/Support:  No cousin jorge good support   Diabetes Disease Process/Treatment Options: Yes Reviewed diabetes pathophysiology, different types of diabetes, signs and symptoms, risk factors and treatment guidelines.    Nutrition/Healthy Eating:  Yes see care plan   Physical Activity/Exercise:   Yes see care plan   Home Blood Glucose Monitoring:   yes see care plan   Acute Complications: Deferred    Chronic Complications: Deferred       Today's interventions were provided through individual discussion, instruction, and written materials were provided.    Patient verbalized understanding of instruction and written materials.  Pt was able to return back demonstration of instructions today. Patient understood key points, needs reinforcement and further instruction.     Diabetes Self-Management Care Plan Review and Evaluation of Progress:    During today's follow-up Radha Diabetes Self-Management Care Plan progress was reviewed and progress was evaluated including his/her input. Helena has agreed to continue his/her journey to improve/maintain overall diabetes control by continuing to set health goals. See care plan progress below.      Care Plan: Diabetes Management   Updates made since 12/5/2023 12:00 AM        Problem: Physical Activity and Exercise         Goal: Patient agrees to increase physical activity to a goal of 4-5  times per week for 10 minutes.    Start Date: 11/6/2024   Expected End Date: 12/4/2024   This Visit's Progress: On track   Priority: Medium   Barriers: Knowledge deficit; Physical Limitations   Note:    11/6/2024 She did some standing exercise with me in office for one min and was tired and wanted to sit down,  we did some chair exercises and she will try to do a little of both at home and rest in between. She was smiling when I had her dance a little.    12/4/2024 States has been walking and on sat dancing, discussed glucose >180 to move more and drink water.        Task: Discussed role of physical activity on reducing insulin resistance and improvement in overall glycemic control. Completed 11/6/2024        Task: Discussed role of physical activity as it relates to weight loss Completed 11/6/2024        Task: Offered suggestions on how patient could increase their regular physical activity Completed 11/6/2024        Task: Reviewed blood glucose monitoring before, during and after exercise/activity Completed 11/6/2024        Problem: Healthy Eating         Long-Range Goal: Patient agrees to stop soda and juice. Completed 12/4/2024   Start Date: 11/6/2024   Expected End Date: 12/4/2024   This Visit's Progress: Met   Priority: High   Barriers: Knowledge deficit   Note:    11/6/2024 Reviewed food exchange list, snack ideas and meal planning tips and gave handouts. She cooks and likes tv dinners, suggested she add frozen veggies to meals. She likes canned veggies discussed low na canned food. She was drinking lots of soda and juice.  She will try diluting juice for now and when done stop and add sugar free sodas if needed.   12/4/2024 States stopped soda and only drinks apple juice once a month if she is constipated.  She does not want to drink sf drinks due to after taste. Reviewed plate method, and wrote her a note to reminded her of what plate method looks like and goals we are working on.        Task: Reviewed the sources and role of Carbohydrate, Protein, and Fat and how each nutrient impacts blood sugar. Completed 12/4/2024        Task: Provided visual examples using dry measuring cups, food models, and other familiar objects such as computer mouse, deck or cards, tennis ball etc. to help with visualization of portions.  Completed 11/6/2024        Task: Explained how to count carbohydrates using the food label and the use of dry measuring cups for accurate carb counting. Completed 11/6/2024        Task: Discussed strategies for choosing healthier menu options when dining out. Completed 11/6/2024        Task: Recommended replacing beverages containing high sugar content with noncaloric/sugar free options and/or water. Completed 11/6/2024        Task: Review the importance of balancing carbohydrates with each meal using portion control techniques to count servings of carbohydrate and label reading to identify serving size and amount of total carbs per serving. Completed 12/4/2024        Task: Provided Sample plate method and reviewed the use of the plate to estimate amounts of carbohydrate per meal. Completed 11/6/2024        Problem: Blood Glucose Self-Monitoring         Long-Range Goal: Patient agrees to use dexcom g7 trend arrows to learn how different foods effect her glucose.    Start Date: 11/6/2024   Expected End Date: 12/4/2024   This Visit's Progress: On track   Priority: Low   Barriers: Knowledge deficit   Note:    11/6/2024  Dexcom g7 CGM reviewed uses    Average glucose is 202 mg/dL  Hypoglycemia frequency 1 % with BG <70  Hyperglycemia frequency 65 % with BG >180  Time in range 34 %  14 Day GMI 82%  Recommendations: States she doesn't love dexcom beeps at 250. Did not know about trend arrow, discussed goal ranges .  Add activity, use plate method to balance meals, watch portion control, stop soda and juice.   See glucose log attached from dexcom readings.   12/4/2024  Dexcom g7 CGM reviewed  Average glucose is 185 mg/dL  Hypoglycemia frequency 0 % with BG <70  Hyperglycemia frequency 56 % with BG >180  Time in range 44 %  Recommendations: increase exercise, use plate method for meals, continue medication as directed, discussed daily elevations and how to decrease.   Called dexcom tech support to get new  sensor shipped out due to one placed in error yesterday in pcp office.   See link below for full report         Problem: Healthy Eating         Long-Range Goal: Patient agrees to use plate method to balance meals    Start Date: 12/4/2024   Expected End Date: 3/5/2025   Priority: High   Barriers: Knowledge deficit   Note:    12/4/24 Reviewed plate method.       Task: Reviewed the sources and role of Carbohydrate, Protein, and Fat and how each nutrient impacts blood sugar. Completed 12/4/2024     Task: Discussed strategies for choosing healthier menu options when dining out. Completed 12/4/2024        Task: Recommended replacing beverages containing high sugar content with noncaloric/sugar free options and/or water. Completed 12/4/2024     Task: Provided Sample plate method and reviewed the use of the plate to estimate amounts of carbohydrate per meal. Completed 12/4/2024        Care Plan: Diabetes Management   Updates made since 12/5/2023 12:00 AM        Problem: Physical Activity and Exercise         Goal: Patient agrees to increase physical activity to a goal of 4-5  times per week for 10 minutes.    Start Date: 11/6/2024   Expected End Date: 12/4/2024   This Visit's Progress: On track   Priority: Medium   Barriers: Knowledge deficit; Physical Limitations   Note:    11/6/2024 She did some standing exercise with me in office for one min and was tired and wanted to sit down, we did some chair exercises and she will try to do a little of both at home and rest in between. She was smiling when I had her dance a little.    12/4/2024 States has been walking and on sat dancing, discussed glucose >180 to move more and drink water.        Task: Discussed role of physical activity on reducing insulin resistance and improvement in overall glycemic control. Completed 11/6/2024        Task: Discussed role of physical activity as it relates to weight loss Completed 11/6/2024        Task: Offered suggestions on how patient could  increase their regular physical activity Completed 11/6/2024        Task: Reviewed blood glucose monitoring before, during and after exercise/activity Completed 11/6/2024        Problem: Healthy Eating         Long-Range Goal: Patient agrees to stop soda and juice. Completed 12/4/2024   Start Date: 11/6/2024   Expected End Date: 12/4/2024   This Visit's Progress: Met   Priority: High   Barriers: Knowledge deficit   Note:    11/6/2024 Reviewed food exchange list, snack ideas and meal planning tips and gave handouts. She cooks and likes tv dinners, suggested she add frozen veggies to meals. She likes canned veggies discussed low na canned food. She was drinking lots of soda and juice.  She will try diluting juice for now and when done stop and add sugar free sodas if needed.   12/4/2024 States stopped soda and only drinks apple juice once a month if she is constipated.  She does not want to drink sf drinks due to after taste. Reviewed plate method, and wrote her a note to reminded her of what plate method looks like and goals we are working on.        Task: Reviewed the sources and role of Carbohydrate, Protein, and Fat and how each nutrient impacts blood sugar. Completed 12/4/2024        Task: Provided visual examples using dry measuring cups, food models, and other familiar objects such as computer mouse, deck or cards, tennis ball etc. to help with visualization of portions. Completed 11/6/2024        Task: Explained how to count carbohydrates using the food label and the use of dry measuring cups for accurate carb counting. Completed 11/6/2024        Task: Discussed strategies for choosing healthier menu options when dining out. Completed 11/6/2024        Task: Recommended replacing beverages containing high sugar content with noncaloric/sugar free options and/or water. Completed 11/6/2024        Task: Review the importance of balancing carbohydrates with each meal using portion control techniques to count  servings of carbohydrate and label reading to identify serving size and amount of total carbs per serving. Completed 12/4/2024        Task: Provided Sample plate method and reviewed the use of the plate to estimate amounts of carbohydrate per meal. Completed 11/6/2024        Problem: Blood Glucose Self-Monitoring         Long-Range Goal: Patient agrees to use dexcom g7 trend arrows to learn how different foods effect her glucose.    Start Date: 11/6/2024   Expected End Date: 12/4/2024   This Visit's Progress: On track   Priority: Low   Barriers: Knowledge deficit   Note:    11/6/2024  Dexcom g7 CGM reviewed uses    Average glucose is 202 mg/dL  Hypoglycemia frequency 1 % with BG <70  Hyperglycemia frequency 65 % with BG >180  Time in range 34 %  14 Day GMI 82%  Recommendations: States she doesn't love dexcom beeps at 250. Did not know about trend arrow, discussed goal ranges .  Add activity, use plate method to balance meals, watch portion control, stop soda and juice.   See glucose log attached from dexcom readings.   12/4/2024  Dexcom g7 CGM reviewed  Average glucose is 185 mg/dL  Hypoglycemia frequency 0 % with BG <70  Hyperglycemia frequency 56 % with BG >180  Time in range 44 %  Recommendations: increase exercise, use plate method for meals, continue medication as directed, discussed daily elevations and how to decrease.   Called dexcom tech support to get new sensor shipped out due to one placed in error yesterday in pcp office.   See link below for full report         Problem: Healthy Eating         Long-Range Goal: Patient agrees to use plate method to balance meals    Start Date: 12/4/2024   Expected End Date: 3/5/2025   Priority: High   Barriers: Knowledge deficit   Note:    12/4/24 Reviewed plate method.       Task: Reviewed the sources and role of Carbohydrate, Protein, and Fat and how each nutrient impacts blood sugar. Completed 12/4/2024     Task: Discussed strategies for choosing  healthier menu options when dining out. Completed 12/4/2024        Task: Recommended replacing beverages containing high sugar content with noncaloric/sugar free options and/or water. Completed 12/4/2024     Task: Provided Sample plate method and reviewed the use of the plate to estimate amounts of carbohydrate per meal. Completed 12/4/2024        File Link    Document on 12/4/2024  2:18 PM by Earlene Stubbs RN: [id number]      Follow Up Plan     Follow up in about 3 months (around 3/4/2025) for acute and chronic complications,review a1c and agp. Appt. Made with pt. Today for 3/5/25 at 9am    Today's care plan and follow up schedule was discussed with patient.  Helena verbalized understanding of the care plan, goals, and agrees to follow up plan.        The patient was encouraged to communicate with his/her health care provider/physician and care team regarding his/her condition(s) and treatment.  I provided the patient with my contact information today and encouraged to contact me via phone or [facility] Patient Portal as needed.     Length of Visit   Total Time: 60 Minutes

## 2024-12-04 NOTE — ASSESSMENT & PLAN NOTE
Continue HCTZ to 25 mg daily + Losartan 100 mg daily.   Start Amlodipine 5 mg daily.   Increase fluid intake.   Low Sodium Diet (DASH Diet - Less than 2 grams of sodium per day).  Monitor blood pressure daily and log. Report consistent numbers greater than 140/90.  Maintain healthy weight with goal BMI <30. Exercise 30 minutes per day, 5 days per week.

## 2024-12-04 NOTE — PROGRESS NOTES
Patient ID: [id number]     Chief Complaint: Diabetes and Hypertension (Follow up)      HPI:     Helena Vazquez is a 54-year-old female present today for a follow-up regarding insulin titration and the management of her hypertension. She was recently treated in the emergency room for elevated blood pressure that was not under control. She did not provide any blood pressure logs for assessment and is not currently tracking her dietary intake. She reports no chest pain, shortness of breath, dizziness, or headaches, and has no additional concerns at this time. She is accompanied by her cousin today.     Past Medical History:   Diagnosis Date    Asthma     Bipolar disorder, unspecified     Diabetes mellitus type 2 in obese     DUB (dysfunctional uterine bleeding)     Edema leg     Family history of breast cancer in sister     x2    Hepatic steatosis     History of infection due to human papilloma virus (HPV)     HLD (hyperlipidemia)     HTN (hypertension)     Hypercholesterolemia     Hyperlipidemia due to type 2 diabetes mellitus 2022    Hypertriglyceridemia     WALTER (iron deficiency anemia)     Microcytic anemia     Morbid obesity     Schizoaffective disorder     Type 2 diabetes mellitus     Type 2 diabetes mellitus with hyperglycemia     Urge incontinence of urine     Vision blurred         Past Surgical History:   Procedure Laterality Date     SECTION  1994     SECTION  1991     SECTION WITH TUBAL LIGATION  1998    CHOLECYSTECTOMY      ESOPHAGOGASTRODUODENOSCOPY N/A 2024    Procedure: EGD (ESOPHAGOGASTRODUODENOSCOPY);  Surgeon: Sandra De La Fuente MD;  Location: [facility];  Service: General;  Laterality: N/A;    HERNIA REPAIR      TUBAL LIGATION          Social History     Socioeconomic History    Marital status:    Tobacco Use    Smoking status: Former    Smokeless tobacco: Never   Substance and Sexual Activity    Alcohol use: Never    Drug use: Never     Sexual activity: Yes     Partners: Male     Birth control/protection: None     Social Drivers of Health     Financial Resource Strain: High Risk (7/15/2024)    Overall Financial Resource Strain (CARDIA)     Difficulty of Paying Living Expenses: Hard   Food Insecurity: No Food Insecurity (7/15/2024)    Hunger Vital Sign     Worried About Running Out of Food in the Last Year: Never true     Ran Out of Food in the Last Year: Never true   Transportation Needs: No Transportation Needs (6/14/2023)    PRAPARE - Transportation     Lack of Transportation (Medical): No     Lack of Transportation (Non-Medical): No   Physical Activity: Unknown (7/15/2024)    Exercise Vital Sign     Days of Exercise per Week: 0 days   Stress: No Stress Concern Present (7/15/2024)    [employer] - Occupational Stress Questionnaire     Feeling of Stress : Not at all   Housing Stability: Low Risk  (6/14/2023)    Housing Stability Vital Sign     Unable to Pay for Housing in the Last Year: No     Number of Places Lived in the Last Year: 1     Unstable Housing in the Last Year: No        Current Outpatient Medications   Medication Instructions    albuterol-ipratropium (DUO-NEB) 2.5 mg-0.5 mg/3 mL nebulizer solution 3 mLs, Nebulization, Every 6 hours PRN, Rescue    alcohol antiseptic pads (ALCOHOL SWABS TOP)   alcohol swabs, See Instructions, use alcohol swab on injection site prior to injection, # 1 box(es), 3 Refill(s), Pharmacy: [facility], 155, cm, Height/Length Dosing, 02/09/22 9:59:00 CST, 116.4, kg, Weight Dosing, 02/09/22 9:59...    amLODIPine (NORVASC) 5 mg, Oral, Daily    atorvastatin (LIPITOR) 80 mg, Oral, Daily    blood sugar diagnostic (ONETOUCH ULTRA TEST) Strp 1 each, Subcutaneous, 3 times daily    blood sugar diagnostic Strp 1 each, Misc.(Non-Drug; Combo Route), Every 10 days    blood-glucose sensor (DEXCOM G7 SENSOR) Megan 1 each, Misc.(Non-Drug; Combo Route), Every 10 days     "famotidine (PEPCID) 20 mg, 2 times daily    fenofibrate (TRICOR) 54 mg, Oral, Daily    glipiZIDE (GLUCOTROL) 10 mg, Oral, 2 times daily    hydroCHLOROthiazide (HYDRODIURIL) 25 mg, Oral    JARDIANCE 25 mg tablet TAKE ONE TABLET BY MOUTH EVERY MORNING    lancets (ONETOUCH DELICA PLUS LANCET) 33 gauge Misc 1 lancet , Subcutaneous, 4 times daily    LANTUS SOLOSTAR U-100 INSULIN 22 Units, Subcutaneous, Nightly    losartan (COZAAR) 100 mg, Oral, Daily    oxybutynin (DITROPAN) 5 mg, Oral, 3 times daily    OZEMPIC 1 mg, Subcutaneous, Every 7 days    pantoprazole (PROTONIX) 40 mg, Oral, 2 times daily, For acid reflux    pen needle, diabetic 31 gauge x 3/16" Ndle 1 Needle, Misc.(Non-Drug; Combo Route), Daily    pioglitazone (ACTOS) 30 mg, Oral, Daily    zolpidem (AMBIEN) 5 mg, Nightly PRN       Review of patient's allergies indicates:  No Known Allergies     Patient Care Team:  Fei Smith DO as PCP - General (Family Medicine)  Care, Jorge L Eye as Consulting Physician (Optometry)  Sandra De La Fuente MD as Consulting Physician (General Surgery)  Earlene Stubbs, RN as Diabetes Educator (Diabetes)     Subjective:     Review of Systems    12 point review of systems conducted, negative except as stated in the history of present illness. See HPI for details.    Objective:     Visit Vitals  BP (!) 143/93 (BP Location: Left arm, Patient Position: Sitting)   Pulse 61   Temp 97.8 °F (36.6 °C)   Resp 20   Ht 5' 2" (1.575 m)   Wt 97.1 kg (214 lb)   SpO2 99%   BMI 39.14 kg/m²       Physical Exam  Vitals and nursing note reviewed.   Constitutional:       General: She is not in acute distress.     Appearance: She is not ill-appearing.   HENT:      Head: Normocephalic and atraumatic.      Mouth/Throat:      Mouth: Mucous membranes are moist.      Pharynx: Oropharynx is clear.   Eyes:      General: No scleral icterus.     Extraocular Movements: Extraocular movements intact.      Conjunctiva/sclera: Conjunctivae normal.      Pupils: " Pupils are equal, round, and reactive to light.   Neck:      Vascular: No carotid bruit.   Cardiovascular:      Rate and Rhythm: Normal rate and regular rhythm.      Heart sounds: No murmur heard.     No friction rub. No gallop.   Pulmonary:      Effort: Pulmonary effort is normal. No respiratory distress.      Breath sounds: Normal breath sounds. No wheezing, rhonchi or rales.   Abdominal:      General: Abdomen is flat. Bowel sounds are normal. There is no distension.      Palpations: Abdomen is soft. There is no mass.      Tenderness: There is no abdominal tenderness.   Musculoskeletal:         General: Normal range of motion.      Cervical back: Normal range of motion and neck supple.   Skin:     General: Skin is warm and dry.   Neurological:      General: No focal deficit present.      Mental Status: She is alert.   Psychiatric:         Mood and Affect: Mood normal. Affect is flat.         Behavior: Behavior is agitated.       Labs Reviewed:     Chemistry:  Lab Results   Component Value Date     10/29/2024    K 4.0 10/29/2024    BUN 10.0 10/29/2024    CREATININE 0.67 10/29/2024    EGFRNORACEVR >60 10/29/2024    GLUCOSE 189 (H) 10/29/2024    CALCIUM 9.9 10/29/2024    ALKPHOS 116 10/29/2024    LABPROT 7.4 10/29/2024    ALBUMIN 3.5 10/29/2024    BILIDIR 0.2 02/08/2022    IBILI 0.30 02/08/2022    AST 13 10/29/2024    ALT 18 10/29/2024    MG 2.00 04/12/2021    TSH 2.7489 02/08/2022        Lab Results   Component Value Date    HGBA1C 9.5 (H) 10/29/2024     Assessment:       ICD-10-CM ICD-9-CM   1. Primary hypertension  I10 401.9   2. Type 2 diabetes mellitus with hyperglycemia, with long-term current use of insulin  E11.65 250.00    Z79.4 790.29     V58.67      Plan:   1. Primary hypertension  Assessment & Plan:  Continue HCTZ to 25 mg daily + Losartan 100 mg daily.   Start Amlodipine 5 mg daily.   Increase fluid intake.   Low Sodium Diet (DASH Diet - Less than 2 grams of sodium per day).  Monitor blood pressure  daily and log. Report consistent numbers greater than 140/90.  Maintain healthy weight with goal BMI <30. Exercise 30 minutes per day, 5 days per week.    Orders:  -     amLODIPine (NORVASC) 5 MG tablet; Take 1 tablet (5 mg total) by mouth once daily.  Dispense: 30 tablet; Refill: 0  -     Comprehensive Metabolic Panel; Future; Expected date: 01/30/2025    2. Type 2 diabetes mellitus with hyperglycemia, with long-term current use of insulin  Assessment & Plan:  Continue Ozempic to 1 mg weekly.   Increase Lantus to 28 units at night.   If blood sugar is > 200, increase Lantus to 28 units nightly.   If blood sugar < 180, continue Lantus 26 units nightly.      Notify clinic ASAP if Dexcom reader is not working, if you experience hypoglycemia, or if you have any questions regarding your treatment plan.   Continue treatment regimen with home health.     Orders:  -     Lipid Panel; Future; Expected date: 01/30/2025  -     Comprehensive Metabolic Panel; Future; Expected date: 01/30/2025  -     Hemoglobin A1C; Future; Expected date: 01/30/2025       Follow up in about 1 month (around 1/3/2025). In addition to their scheduled follow up, the patient has also been instructed to follow up on as needed basis.     Future Appointments   Date Time Provider Department Center   12/4/2024  1:00 PM Earlene Stubbs RN [facility]    1/31/2025  9:00 AM Smiley Natarajan NP [facility]   4/28/2025  3:00 PM Smiley Natarajan NP [facility] RIGO Natarajan NP

## 2024-12-09 PROCEDURE — G0179 MD RECERTIFICATION HHA PT: HCPCS | Mod: ,,,

## 2024-12-13 ENCOUNTER — TELEPHONE (OUTPATIENT)
Dept: FAMILY MEDICINE | Facility: CLINIC | Age: 54
End: 2024-12-13
Payer: MEDICAID

## 2024-12-13 NOTE — TELEPHONE ENCOUNTER
Abigail with [facility] called stating the patient was having sharp LUQ pain and she felt something on the left side that the right did not have. She was instructed to go to er but refused. I said she can try and take a laxative to have a bm to get relief but if her symptoms worsen or not improve she needs to go to the er. Abigail verbalized understanding and related the message to the patient

## 2024-12-16 ENCOUNTER — TELEPHONE (OUTPATIENT)
Dept: FAMILY MEDICINE | Facility: CLINIC | Age: 54
End: 2024-12-16
Payer: MEDICAID

## 2024-12-17 ENCOUNTER — HOSPITAL ENCOUNTER (EMERGENCY)
Facility: HOSPITAL | Age: 54
Discharge: HOME | End: 2024-12-17
Attending: FAMILY MEDICINE
Payer: MEDICAID

## 2024-12-17 VITALS
DIASTOLIC BLOOD PRESSURE: 90 MMHG | OXYGEN SATURATION: 96 % | RESPIRATION RATE: 20 BRPM | BODY MASS INDEX: 39.01 KG/M2 | TEMPERATURE: 97 F | HEART RATE: 80 BPM | WEIGHT: 212 LBS | HEIGHT: 62 IN | SYSTOLIC BLOOD PRESSURE: 143 MMHG

## 2024-12-17 DIAGNOSIS — A04.8 H. PYLORI INFECTION: Primary | ICD-10-CM

## 2024-12-17 DIAGNOSIS — E88.89 STEATOSIS: ICD-10-CM

## 2024-12-17 DIAGNOSIS — Z12.31 BREAST CANCER SCREENING BY MAMMOGRAM: Primary | ICD-10-CM

## 2024-12-17 DIAGNOSIS — R10.13 EPIGASTRIC ABDOMINAL PAIN: ICD-10-CM

## 2024-12-17 LAB
ALBUMIN SERPL-MCNC: 3.8 G/DL (ref 3.5–5)
ALBUMIN/GLOB SERPL: 0.9 RATIO (ref 1.1–2)
ALP SERPL-CCNC: 120 UNIT/L (ref 40–150)
ALT SERPL-CCNC: 19 UNIT/L (ref 0–55)
AMYLASE SERPL-CCNC: 31 UNIT/L (ref 25–125)
ANION GAP SERPL CALC-SCNC: 11 MEQ/L
AST SERPL-CCNC: 14 UNIT/L (ref 5–34)
BACTERIA #/AREA URNS AUTO: ABNORMAL /HPF
BASOPHILS # BLD AUTO: 0.04 X10(3)/MCL
BASOPHILS NFR BLD AUTO: 0.3 %
BILIRUB SERPL-MCNC: 0.6 MG/DL
BILIRUB UR QL STRIP.AUTO: NEGATIVE
BUN SERPL-MCNC: 7 MG/DL (ref 9.8–20.1)
CALCIUM SERPL-MCNC: 10.2 MG/DL (ref 8.4–10.2)
CHLORIDE SERPL-SCNC: 104 MMOL/L (ref 98–107)
CLARITY UR: ABNORMAL
CO2 SERPL-SCNC: 25 MMOL/L (ref 22–29)
COLOR UR AUTO: YELLOW
CREAT SERPL-MCNC: 0.74 MG/DL (ref 0.55–1.02)
CREAT/UREA NIT SERPL: 9
EOSINOPHIL # BLD AUTO: 0.28 X10(3)/MCL (ref 0–0.9)
EOSINOPHIL NFR BLD AUTO: 2.4 %
ERYTHROCYTE [DISTWIDTH] IN BLOOD BY AUTOMATED COUNT: 13.5 % (ref 11.5–17)
GFR SERPLBLD CREATININE-BSD FMLA CKD-EPI: >60 ML/MIN/1.73/M2
GLOBULIN SER-MCNC: 4.1 GM/DL (ref 2.4–3.5)
GLUCOSE SERPL-MCNC: 144 MG/DL (ref 74–100)
GLUCOSE UR QL STRIP: NEGATIVE
HCT VFR BLD AUTO: 47.3 % (ref 37–47)
HGB BLD-MCNC: 15.4 G/DL (ref 12–16)
HGB UR QL STRIP: NEGATIVE
IMM GRANULOCYTES # BLD AUTO: 0.05 X10(3)/MCL (ref 0–0.04)
IMM GRANULOCYTES NFR BLD AUTO: 0.4 %
KETONES UR QL STRIP: NEGATIVE
LEUKOCYTE ESTERASE UR QL STRIP: NEGATIVE
LIPASE SERPL-CCNC: 15 U/L
LYMPHOCYTES # BLD AUTO: 3.52 X10(3)/MCL (ref 0.6–4.6)
LYMPHOCYTES NFR BLD AUTO: 29.6 %
MCH RBC QN AUTO: 27 PG (ref 27–31)
MCHC RBC AUTO-ENTMCNC: 32.6 G/DL (ref 33–36)
MCV RBC AUTO: 82.8 FL (ref 80–94)
MONOCYTES # BLD AUTO: 0.5 X10(3)/MCL (ref 0.1–1.3)
MONOCYTES NFR BLD AUTO: 4.2 %
MUCOUS THREADS URNS QL MICRO: ABNORMAL /LPF
NEUTROPHILS # BLD AUTO: 7.52 X10(3)/MCL (ref 2.1–9.2)
NEUTROPHILS NFR BLD AUTO: 63.1 %
NITRITE UR QL STRIP: NEGATIVE
NRBC BLD AUTO-RTO: 0 %
PH UR STRIP: 6 [PH]
PLATELET # BLD AUTO: 340 X10(3)/MCL (ref 130–400)
PMV BLD AUTO: 9.4 FL (ref 7.4–10.4)
POTASSIUM SERPL-SCNC: 3.7 MMOL/L (ref 3.5–5.1)
PROT SERPL-MCNC: 7.9 GM/DL (ref 6.4–8.3)
PROT UR QL STRIP: ABNORMAL
RBC # BLD AUTO: 5.71 X10(6)/MCL (ref 4.2–5.4)
RBC #/AREA URNS AUTO: ABNORMAL /HPF
SODIUM SERPL-SCNC: 140 MMOL/L (ref 136–145)
SP GR UR STRIP.AUTO: 1.02 (ref 1–1.03)
SQUAMOUS #/AREA URNS AUTO: ABNORMAL /HPF
UROBILINOGEN UR STRIP-ACNC: 0.2
WBC # BLD AUTO: 11.91 X10(3)/MCL (ref 4.5–11.5)
WBC #/AREA URNS AUTO: ABNORMAL /HPF

## 2024-12-17 PROCEDURE — 96374 THER/PROPH/DIAG INJ IV PUSH: CPT | Mod: 59

## 2024-12-17 PROCEDURE — 81003 URINALYSIS AUTO W/O SCOPE: CPT | Performed by: FAMILY MEDICINE

## 2024-12-17 PROCEDURE — 80053 COMPREHEN METABOLIC PANEL: CPT | Performed by: FAMILY MEDICINE

## 2024-12-17 PROCEDURE — 63600175 PHARM REV CODE 636 W HCPCS: Performed by: FAMILY MEDICINE

## 2024-12-17 PROCEDURE — 25500020 PHARM REV CODE 255: Performed by: FAMILY MEDICINE

## 2024-12-17 PROCEDURE — 96375 TX/PRO/DX INJ NEW DRUG ADDON: CPT

## 2024-12-17 PROCEDURE — 96361 HYDRATE IV INFUSION ADD-ON: CPT

## 2024-12-17 PROCEDURE — 83690 ASSAY OF LIPASE: CPT | Performed by: FAMILY MEDICINE

## 2024-12-17 PROCEDURE — 93005 ELECTROCARDIOGRAM TRACING: CPT

## 2024-12-17 PROCEDURE — 82150 ASSAY OF AMYLASE: CPT | Performed by: FAMILY MEDICINE

## 2024-12-17 PROCEDURE — 99285 EMERGENCY DEPT VISIT HI MDM: CPT | Mod: 25

## 2024-12-17 PROCEDURE — 85025 COMPLETE CBC W/AUTO DIFF WBC: CPT | Performed by: FAMILY MEDICINE

## 2024-12-17 RX ORDER — HYDROCODONE BITARTRATE AND ACETAMINOPHEN 7.5; 325 MG/1; MG/1
1 TABLET ORAL EVERY 6 HOURS PRN
Qty: 4 TABLET | Refills: 0 | Status: SHIPPED | OUTPATIENT
Start: 2024-12-17 | End: 2024-12-19

## 2024-12-17 RX ORDER — ONDANSETRON HYDROCHLORIDE 2 MG/ML
4 INJECTION, SOLUTION INTRAVENOUS
Status: COMPLETED | OUTPATIENT
Start: 2024-12-17 | End: 2024-12-17

## 2024-12-17 RX ORDER — LANSOPRAZOLE, AMOXICILLIN, CLARITHROMYCIN 30-500-500
KIT ORAL
Qty: 1 KIT | Refills: 0 | Status: SHIPPED | OUTPATIENT
Start: 2024-12-17 | End: 2024-12-31

## 2024-12-17 RX ORDER — MEPERIDINE HYDROCHLORIDE 25 MG/ML
25 INJECTION INTRAMUSCULAR; INTRAVENOUS; SUBCUTANEOUS
Status: COMPLETED | OUTPATIENT
Start: 2024-12-17 | End: 2024-12-17

## 2024-12-17 RX ORDER — IOPAMIDOL 755 MG/ML
100 INJECTION, SOLUTION INTRAVASCULAR
Status: COMPLETED | OUTPATIENT
Start: 2024-12-17 | End: 2024-12-17

## 2024-12-17 RX ADMIN — MEPERIDINE HYDROCHLORIDE 25 MG: 25 INJECTION INTRAMUSCULAR; INTRAVENOUS; SUBCUTANEOUS at 01:12

## 2024-12-17 RX ADMIN — IOPAMIDOL 100 ML: 755 INJECTION, SOLUTION INTRAVENOUS at 01:12

## 2024-12-17 RX ADMIN — SODIUM CHLORIDE, POTASSIUM CHLORIDE, SODIUM LACTATE AND CALCIUM CHLORIDE 1000 ML: 600; 310; 30; 20 INJECTION, SOLUTION INTRAVENOUS at 01:12

## 2024-12-17 RX ADMIN — ONDANSETRON 4 MG: 2 INJECTION INTRAMUSCULAR; INTRAVENOUS at 01:12

## 2024-12-17 NOTE — ED PROVIDER NOTES
Encounter Date: 2024       History     Chief Complaint   Patient presents with    Abdominal Pain     Left upper abd pain started on Friday, sharp pain, constant LOP 10/10     This patient is a 54-year-old female who comes in with left upper abdominal pain that started on Friday which is sharp and constant.  Patient states that she had similar symptoms in the past and she was diagnosed with H pylori.  Had an EGD where they did a biopsy and found that to be positive for H pylori.  He took the prep back and she felt better.  Today she states her pain is horrible, she does not have a gallbladder.    The history is provided by the patient.     Review of patient's allergies indicates:  No Known Allergies  Past Medical History:   Diagnosis Date    Asthma     Bipolar disorder, unspecified     Diabetes mellitus type 2 in obese     DUB (dysfunctional uterine bleeding)     Edema leg     Family history of breast cancer in sister     x2    Hepatic steatosis     History of infection due to human papilloma virus (HPV)     HLD (hyperlipidemia)     HTN (hypertension)     Hypercholesterolemia     Hyperlipidemia due to type 2 diabetes mellitus 2022    Hypertriglyceridemia     WALTER (iron deficiency anemia)     Microcytic anemia     Morbid obesity     Schizoaffective disorder     Type 2 diabetes mellitus     Type 2 diabetes mellitus with hyperglycemia     Urge incontinence of urine     Vision blurred      Past Surgical History:   Procedure Laterality Date     SECTION  1994     SECTION  1991     SECTION WITH TUBAL LIGATION  1998    CHOLECYSTECTOMY      ESOPHAGOGASTRODUODENOSCOPY N/A 2024    Procedure: EGD (ESOPHAGOGASTRODUODENOSCOPY);  Surgeon: Sandra De La Fuente MD;  Location: [facility];  Service: General;  Laterality: N/A;    HERNIA REPAIR      TUBAL LIGATION       Family History   Problem Relation Name Age of Onset    Heart attack Mother Shauna stephanie     Diabetes  Mellitus Mother Shauna stephanie     Coronary artery disease Mother Shauna stephanie     Hypertension Mother Shauna stephanie     Asthma Mother Shauna stephanie     Depression Mother Shauna stephanie     Learning disabilities Mother Shauna stephanie     Lung cancer Father Jayson champagne     Diabetes Mellitus Father Jayson champagne     Diabetes Father Jayson champagne     Breast cancer Sister      Diabetes Sister      Breast cancer Sister       Social History     Tobacco Use    Smoking status: Former    Smokeless tobacco: Never   Substance Use Topics    Alcohol use: Never    Drug use: Never     Review of Systems   Constitutional: Negative.    HENT: Negative.     Respiratory: Negative.     Cardiovascular: Negative.    Gastrointestinal:  Positive for abdominal pain and nausea.   Endocrine: Negative.    Musculoskeletal: Negative.    Skin: Negative.    Neurological: Negative.    Psychiatric/Behavioral: Negative.     All other systems reviewed and are negative.      Physical Exam     Initial Vitals [12/17/24 1214]   BP Pulse Resp Temp SpO2   (!) 181/122 (!) 114 20 97.3 °F (36.3 °C) 99 %      MAP       --         Physical Exam    Nursing note and vitals reviewed.  Constitutional: She appears well-developed.   HENT:   Head: Normocephalic.   Eyes: Pupils are equal, round, and reactive to light.   Neck:   Normal range of motion.  Cardiovascular:  Normal rate, regular rhythm and normal heart sounds.           Pulmonary/Chest: Breath sounds normal.   Abdominal: Abdomen is soft and protuberant. Bowel sounds are normal. She exhibits distension.   Patient's belly is large and appears to be distended but she has normal bowel sounds       Musculoskeletal:         General: Normal range of motion.      Cervical back: Normal range of motion.     Neurological: She is alert and oriented to person, place, and time.   Skin: Skin is warm and dry.   Psychiatric: She has a normal mood and affect.         ED Course   Procedures  Labs Reviewed   COMPREHENSIVE  METABOLIC PANEL - Abnormal       Result Value    Sodium 140      Potassium 3.7      Chloride 104      CO2 25      Glucose 144 (*)     Blood Urea Nitrogen 7.0 (*)     Creatinine 0.74      Calcium 10.2      Protein Total 7.9      Albumin 3.8      Globulin 4.1 (*)     Albumin/Globulin Ratio 0.9 (*)     Bilirubin Total 0.6            ALT 19      AST 14      eGFR >60      Anion Gap 11.0      BUN/Creatinine Ratio 9     CBC WITH DIFFERENTIAL - Abnormal    WBC 11.91 (*)     RBC 5.71 (*)     Hgb 15.4      Hct 47.3 (*)     MCV 82.8      MCH 27.0      MCHC 32.6 (*)     RDW 13.5      Platelet 340      MPV 9.4      Neut % 63.1      Lymph % 29.6      Mono % 4.2      Eos % 2.4      Basophil % 0.3      Lymph # 3.52      Neut # 7.52      Mono # 0.50      Eos # 0.28      Baso # 0.04      IG# 0.05 (*)     IG% 0.4      NRBC% 0.0     URINALYSIS, REFLEX TO URINE CULTURE - Abnormal    Color, UA Yellow      Appearance, UA Slightly Cloudy (*)     Specific Gravity, UA 1.025      pH, UA 6.0      Protein, UA Trace (*)     Glucose, UA Negative      Ketones, UA Negative      Blood, UA Negative      Bilirubin, UA Negative      Urobilinogen, UA 0.2      Nitrites, UA Negative      Leukocyte Esterase, UA Negative     URINALYSIS, MICROSCOPIC - Abnormal    Bacteria, UA Rare      Mucous, UA Moderate (*)     RBC, UA 0-2      WBC, UA None Seen      Squamous Epithelial Cells, UA Few (*)    AMYLASE - Normal    Amylase Level 31     LIPASE - Normal    Lipase Level 15     CBC W/ AUTO DIFFERENTIAL    Narrative:     The following orders were created for panel order CBC auto differential.  Procedure                               Abnormality         Status                     ---------                               -----------         ------                     CBC with Differential[[id number]]       Abnormal            Final result                 Please view results for these tests on the individual orders.     EKG Readings: (Independently Interpreted)    Initial Reading: No STEMI. Rhythm: Sinus Tachycardia. Heart Rate: 117. Ectopy: No Ectopy. Conduction: Normal. ST Segments: Non-Specific ST Segment Depression. Other Impression: biatriaL ENLARGEMENT     ECG Results              EKG 12-lead (In process)        Collection Time Result Time QRS Duration OHS QTC Calculation    12/17/24 12:07:25 12/17/24 14:13:48 92 449                     In process by Interface, Lab In [facility] (12/17/24 14:13:49)                   Narrative:    Test Reason : R10.13,    Vent. Rate : 117 BPM     Atrial Rate : 117 BPM     P-R Int : 166 ms          QRS Dur :  92 ms      QT Int : 322 ms       P-R-T Axes :  67 182  30 degrees    QTcB Int : 449 ms    Sinus tachycardia  Biatrial enlargement  Right superior axis deviation  Pulmonary disease pattern  Right ventricular conduction delay  Abnormal ECG  When compared with ECG of 19-Nov-2024 12:46,  Vent. rate has increased by  42 bpm  RSR' pattern in V1 is now Present    Referred By: ALISSA DIAZ           Confirmed By:                                   Imaging Results              CT Abdomen Pelvis With IV Contrast NO Oral Contrast (Final result)  Result time 12/17/24 14:00:15      Final result by Noemi Hurley MD (12/17/24 14:00:15)                   Impression:      1. No appreciable acute intra-abdominal abnormality by CT evaluation.      Electronically signed by: Noemi Hurley  Date:    12/17/2024  Time:    14:00               Narrative:    EXAMINATION:  CT ABDOMEN PELVIS WITH IV CONTRAST    CLINICAL HISTORY:  Abdominal abscess/infection suspected;    TECHNIQUE:  Helically acquired images with axial, sagittal and coronal reformations were obtained from the lung bases to the pubic symphysis after the IV administration of contrast.    Automated tube current modulation, weight-based exposure dosing, and/or iterative reconstruction technique utilized to reach lowest reasonably achievable exposure rate.    DLP: 546  mGy*cm    COMPARISON:  CT abdomen 04/29/2024    FINDINGS:  HEART: Normal in size. No pericardial effusion.    LUNG BASES: Well aerated.    LIVER: The liver is steatotic.  Unchanged distribution of nodular hyperdense hepatic lesions, the largest of which is present in the right lobe and measures approximately 3 cm; previously 3-4 cm..    BILIARY: Gallbladder is surgically absent.    PANCREAS: No inflammatory change.    SPLEEN: Normal in size    ADRENALS: No mass.    KIDNEYS/URETERS: The kidneys enhance symmetrically.  No hydronephrosis.    GI TRACT/MESENTERY:  No evidence of bowel obstruction or inflammation. The appendix is normal.    PERITONEUM: No free fluid.No free air.    LYMPH NODES: No enlarged lymph nodes by size criteria.    VASCULATURE: Mild aortoiliac atherosclerosis.    BLADDER: Normal appearance given degree of distention.    REPRODUCTIVE ORGANS: Normal as visualized.    SOFT TISSUES: Unremarkable.    BONES: No acute osseous abnormality.                                       Medications   lactated ringers bolus 1,000 mL (0 mLs Intravenous Stopped 12/17/24 1405)   meperidine (PF) injection 25 mg (25 mg Intravenous Given 12/17/24 1306)   ondansetron injection 4 mg (4 mg Intravenous Given 12/17/24 1306)   iopamidoL (ISOVUE-370) injection 100 mL (100 mLs Intravenous Given 12/17/24 1338)     Medical Decision Making  This patient is a 54-year-old female who comes in with abdominal pain, mostly on the left upper quadrant and epigastric since Friday  Differential diagnosis: Gastritis, pancreatitis, constipation, gastroenteritis    Amount and/or Complexity of Data Reviewed  Labs: ordered.     Details: Lab work on this patient shows that she has moderate mucus in her urine with slightly cloudy appearance and trace protein, amylase and lipase are normal, glucose is 144 with a BUN of 7, CBC shows a white blood cell count of 11.91 with a hematocrit of 47.3  Radiology: ordered.     Details: CT abdomen pelvis shows no  appreciable acute intra-abdominal abnormality by CT evaluation.  PATIENT HAS A FATTY LIVER BUT THIS IS CHRONIC AND THAT IS WHY IT IS NOT MENTIONED IN THE IMPRESSION BUT SHE ALSO HAS SOME HYPODENSE LESIONS IN HER LIVER PROBABLY SECONDARY TO STEATOSIS    Risk  Prescription drug management.                                      Clinical Impression:  Final diagnoses:  [R10.13] Epigastric abdominal pain  [E88.89] Steatosis  [A04.8] H. pylori infection (Primary)          ED Disposition Condition    Discharge Stable          ED Prescriptions       Medication Sig Dispense Start Date End Date Auth. Provider    amoxicillin-clarithromycin-lansoprazole (PREVPAC) 500-500-30 mg combo pack Follow package directions. 1 kit 12/17/2024 12/31/2024 Tushar Apple MD    HYDROcodone-acetaminophen (NORCO) 7.5-325 mg per tablet Take 1 tablet by mouth every 6 (six) hours as needed for Pain. 4 tablet 12/17/2024 12/19/2024 Tushar Apple MD          Follow-up Information       Follow up With Specialties Details Why Contact Info    Fei Smith, DO Family Medicine   [address] [facility] [address]  [phone]               Tushar Apple MD  12/17/24 7906

## 2024-12-17 NOTE — ED NOTES
"Patient ambulated to ER room 6 with slow steady gait.  Reports that she has been having sharp left upper abd pain since Friday.  Rates pain a 10/10 on pain scale.  History of similar situation happening earlier this year where the pt had and EGD and was told that "I had an infection in my stomach"  Friend / Family at the bedside.  "

## 2024-12-18 ENCOUNTER — TELEPHONE (OUTPATIENT)
Dept: FAMILY MEDICINE | Facility: CLINIC | Age: 54
End: 2024-12-18
Payer: MEDICAID

## 2024-12-18 NOTE — TELEPHONE ENCOUNTER
Mattie spoke with the pharmacy. The patient will be able to get her medication. Cost was the issue.

## 2024-12-19 LAB
OHS QRS DURATION: 92 MS
OHS QTC CALCULATION: 449 MS

## 2024-12-23 ENCOUNTER — TELEPHONE (OUTPATIENT)
Dept: FAMILY MEDICINE | Facility: CLINIC | Age: 54
End: 2024-12-23
Payer: MEDICAID

## 2024-12-23 DIAGNOSIS — I10 PRIMARY HYPERTENSION: Chronic | ICD-10-CM

## 2024-12-23 RX ORDER — AMLODIPINE BESYLATE 10 MG/1
10 TABLET ORAL DAILY
Qty: 90 TABLET | Refills: 0 | Status: SHIPPED | OUTPATIENT
Start: 2024-12-23 | End: 2025-03-23

## 2025-01-06 ENCOUNTER — OFFICE VISIT (OUTPATIENT)
Dept: FAMILY MEDICINE | Facility: CLINIC | Age: 55
End: 2025-01-06
Payer: MEDICAID

## 2025-01-06 ENCOUNTER — LAB VISIT (OUTPATIENT)
Dept: LAB | Facility: HOSPITAL | Age: 55
End: 2025-01-06
Attending: FAMILY MEDICINE
Payer: MEDICAID

## 2025-01-06 VITALS
DIASTOLIC BLOOD PRESSURE: 78 MMHG | OXYGEN SATURATION: 98 % | RESPIRATION RATE: 18 BRPM | HEART RATE: 76 BPM | SYSTOLIC BLOOD PRESSURE: 136 MMHG | WEIGHT: 213.81 LBS | TEMPERATURE: 97 F | BODY MASS INDEX: 39.34 KG/M2 | HEIGHT: 62 IN

## 2025-01-06 DIAGNOSIS — B96.81 HELICOBACTER PYLORI GASTRITIS: ICD-10-CM

## 2025-01-06 DIAGNOSIS — R10.9 ABDOMINAL PAIN, UNSPECIFIED ABDOMINAL LOCATION: ICD-10-CM

## 2025-01-06 DIAGNOSIS — E11.65 TYPE 2 DIABETES MELLITUS WITH HYPERGLYCEMIA, WITH LONG-TERM CURRENT USE OF INSULIN: ICD-10-CM

## 2025-01-06 DIAGNOSIS — I10 PRIMARY HYPERTENSION: Primary | Chronic | ICD-10-CM

## 2025-01-06 DIAGNOSIS — Z79.4 TYPE 2 DIABETES MELLITUS WITH HYPERGLYCEMIA, WITH LONG-TERM CURRENT USE OF INSULIN: ICD-10-CM

## 2025-01-06 DIAGNOSIS — F31.30 BIPOLAR AFFECTIVE DISORDER, CURRENT EPISODE DEPRESSED, CURRENT EPISODE SEVERITY UNSPECIFIED: ICD-10-CM

## 2025-01-06 DIAGNOSIS — I10 PRIMARY HYPERTENSION: Chronic | ICD-10-CM

## 2025-01-06 DIAGNOSIS — K29.70 HELICOBACTER PYLORI GASTRITIS: ICD-10-CM

## 2025-01-06 LAB
ALBUMIN SERPL-MCNC: 3.9 G/DL (ref 3.5–5)
ALBUMIN/GLOB SERPL: 0.9 RATIO (ref 1.1–2)
ALP SERPL-CCNC: 127 UNIT/L (ref 40–150)
ALT SERPL-CCNC: 18 UNIT/L (ref 0–55)
ANION GAP SERPL CALC-SCNC: 10 MEQ/L
AST SERPL-CCNC: 13 UNIT/L (ref 5–34)
BILIRUB SERPL-MCNC: 0.5 MG/DL
BILIRUB UR QL STRIP.AUTO: NEGATIVE
BUN SERPL-MCNC: 9 MG/DL (ref 9.8–20.1)
CALCIUM SERPL-MCNC: 10.6 MG/DL (ref 8.4–10.2)
CHLORIDE SERPL-SCNC: 107 MMOL/L (ref 98–107)
CHOLEST SERPL-MCNC: 236 MG/DL
CHOLEST/HDLC SERPL: 5 {RATIO} (ref 0–5)
CLARITY UR: CLEAR
CO2 SERPL-SCNC: 29 MMOL/L (ref 22–29)
COLOR UR AUTO: YELLOW
CREAT SERPL-MCNC: 0.64 MG/DL (ref 0.55–1.02)
CREAT/UREA NIT SERPL: 14
EST. AVERAGE GLUCOSE BLD GHB EST-MCNC: 139.9 MG/DL
GFR SERPLBLD CREATININE-BSD FMLA CKD-EPI: >60 ML/MIN/1.73/M2
GLOBULIN SER-MCNC: 4.3 GM/DL (ref 2.4–3.5)
GLUCOSE SERPL-MCNC: 109 MG/DL (ref 74–100)
GLUCOSE UR QL STRIP: NEGATIVE
HBA1C MFR BLD: 6.5 %
HDLC SERPL-MCNC: 44 MG/DL (ref 35–60)
HGB UR QL STRIP: NEGATIVE
KETONES UR QL STRIP: NEGATIVE
LDLC SERPL CALC-MCNC: 155 MG/DL (ref 50–140)
LEUKOCYTE ESTERASE UR QL STRIP: NEGATIVE
NITRITE UR QL STRIP: NEGATIVE
PH UR STRIP: 6 [PH]
POTASSIUM SERPL-SCNC: 4.9 MMOL/L (ref 3.5–5.1)
PROT SERPL-MCNC: 8.2 GM/DL (ref 6.4–8.3)
PROT UR QL STRIP: NEGATIVE
SODIUM SERPL-SCNC: 146 MMOL/L (ref 136–145)
SP GR UR STRIP.AUTO: >=1.03 (ref 1–1.03)
TRIGL SERPL-MCNC: 186 MG/DL (ref 37–140)
UROBILINOGEN UR STRIP-ACNC: 1
VLDLC SERPL CALC-MCNC: 37 MG/DL

## 2025-01-06 PROCEDURE — 3008F BODY MASS INDEX DOCD: CPT | Mod: CPTII,,, | Performed by: FAMILY MEDICINE

## 2025-01-06 PROCEDURE — 3044F HG A1C LEVEL LT 7.0%: CPT | Mod: CPTII,,, | Performed by: FAMILY MEDICINE

## 2025-01-06 PROCEDURE — 36415 COLL VENOUS BLD VENIPUNCTURE: CPT

## 2025-01-06 PROCEDURE — 1160F RVW MEDS BY RX/DR IN RCRD: CPT | Mod: CPTII,,, | Performed by: FAMILY MEDICINE

## 2025-01-06 PROCEDURE — 99214 OFFICE O/P EST MOD 30 MIN: CPT | Mod: ,,, | Performed by: FAMILY MEDICINE

## 2025-01-06 PROCEDURE — 1159F MED LIST DOCD IN RCRD: CPT | Mod: CPTII,,, | Performed by: FAMILY MEDICINE

## 2025-01-06 PROCEDURE — 3078F DIAST BP <80 MM HG: CPT | Mod: CPTII,,, | Performed by: FAMILY MEDICINE

## 2025-01-06 PROCEDURE — 4010F ACE/ARB THERAPY RXD/TAKEN: CPT | Mod: CPTII,,, | Performed by: FAMILY MEDICINE

## 2025-01-06 PROCEDURE — 83036 HEMOGLOBIN GLYCOSYLATED A1C: CPT

## 2025-01-06 PROCEDURE — 3075F SYST BP GE 130 - 139MM HG: CPT | Mod: CPTII,,, | Performed by: FAMILY MEDICINE

## 2025-01-06 PROCEDURE — 80061 LIPID PANEL: CPT

## 2025-01-06 PROCEDURE — 80053 COMPREHEN METABOLIC PANEL: CPT

## 2025-01-06 PROCEDURE — 81003 URINALYSIS AUTO W/O SCOPE: CPT

## 2025-01-06 RX ORDER — VALSARTAN 160 MG/1
160 TABLET ORAL DAILY
Qty: 90 TABLET | Refills: 3 | Status: SHIPPED | OUTPATIENT
Start: 2025-01-06 | End: 2026-01-06

## 2025-01-06 RX ORDER — VENLAFAXINE HYDROCHLORIDE 37.5 MG/1
37.5 CAPSULE, EXTENDED RELEASE ORAL DAILY
Qty: 30 CAPSULE | Refills: 11 | Status: SHIPPED | OUTPATIENT
Start: 2025-01-06 | End: 2026-01-06

## 2025-01-06 NOTE — PROGRESS NOTES
Patient ID: [id number]     Chief Complaint: Hypertension (Bp has been high at home.) and Depression (Been depressed for month now. )    HPI:     Helena Vazquez is a 54 y.o. female here today for Hypertension (Bp has been high at home.) and Depression (Been depressed for month now. ) No other complaints today.     -------------------------------------    Asthma    Bipolar disorder, unspecified    Diabetes mellitus type 2 in obese    DUB (dysfunctional uterine bleeding)    Edema leg    Family history of breast cancer in sister    x2    Hepatic steatosis    History of infection due to human papilloma virus (HPV)    HLD (hyperlipidemia)    HTN (hypertension)    Hypercholesterolemia    Hyperlipidemia due to type 2 diabetes mellitus    Hypertriglyceridemia    WALTER (iron deficiency anemia)    Microcytic anemia    Morbid obesity    Schizoaffective disorder    Type 2 diabetes mellitus    Type 2 diabetes mellitus with hyperglycemia    Urge incontinence of urine    Vision blurred        Past Surgical History:   Procedure Laterality Date     SECTION  1994     SECTION  1991     SECTION WITH TUBAL LIGATION  1998    CHOLECYSTECTOMY      ESOPHAGOGASTRODUODENOSCOPY N/A 2024    Procedure: EGD (ESOPHAGOGASTRODUODENOSCOPY);  Surgeon: Sandra De La Fuente MD;  Location: [facility];  Service: General;  Laterality: N/A;    HERNIA REPAIR      TUBAL LIGATION         Review of patient's allergies indicates:  No Known Allergies    Outpatient Medications Marked as Taking for the 25 encounter (Office Visit) with Fei Smith, DO   Medication Sig Dispense Refill    albuterol-ipratropium (DUO-NEB) 2.5 mg-0.5 mg/3 mL nebulizer solution Take 3 mLs by nebulization every 6 (six) hours as needed for Wheezing. Rescue 75 mL 5    alcohol antiseptic pads (ALCOHOL SWABS TOP)   alcohol swabs, See Instructions, use alcohol swab on injection site prior to injection, # 1 box(es), 3 Refill(s), Pharmacy:  "[facility], 155, cm, Height/Length Dosing, 02/09/22 9:59:00 CST, 116.4, kg, Weight Dosing, 02/09/22 9:59...      amLODIPine (NORVASC) 10 MG tablet Take 1 tablet (10 mg total) by mouth once daily. 90 tablet 0    atorvastatin (LIPITOR) 80 MG tablet Take 1 tablet (80 mg total) by mouth once daily. 90 tablet 3    blood sugar diagnostic (ONETOUCH ULTRA TEST) Strp Inject 1 each into the skin 3 (three) times daily. 100 strip 5    blood sugar diagnostic Strp 1 each by Misc.(Non-Drug; Combo Route) route every 10 days. 3 each 3    blood-glucose sensor (DEXCOM G7 SENSOR) Megan 1 each by Misc.(Non-Drug; Combo Route) route every 10 days. 3 each 2    famotidine (PEPCID) 20 MG tablet Take 20 mg by mouth 2 (two) times daily.      fenofibrate (TRICOR) 54 MG tablet Take 1 tablet (54 mg total) by mouth once daily. 90 tablet 3    glipiZIDE (GLUCOTROL) 10 MG tablet TAKE 1 TABLET BY MOUTH 2 TIMES A DAY 60 tablet 5    hydroCHLOROthiazide (HYDRODIURIL) 25 MG tablet TAKE ONE TABLET BY MOUTH ONCE DAILY 30 tablet 2    insulin glargine U-100, Lantus, (LANTUS SOLOSTAR U-100 INSULIN) 100 unit/mL (3 mL) InPn pen Inject 22 Units into the skin every evening. (Patient taking differently: Inject 28 Units into the skin every evening. Only glucose is over 200  Takes 26 units if under 200) 6.6 mL 0    JARDIANCE 25 mg tablet TAKE ONE TABLET BY MOUTH EVERY MORNING 30 tablet 5    lancets (ONETOUCH DELICA PLUS LANCET) 33 gauge Misc Inject 1 lancet  into the skin 4 (four) times daily. 120 each 3    oxybutynin (DITROPAN) 5 MG Tab TAKE ONE TABLET BY MOUTH THREE TIMES A DAY 90 tablet 3    pantoprazole (PROTONIX) 40 MG tablet Take 1 tablet (40 mg total) by mouth 2 (two) times daily. For acid reflux 180 tablet 0    pen needle, diabetic 31 gauge x 3/16" Ndle 1 Needle by Misc.(Non-Drug; Combo Route) route once daily. 50 each 2    pioglitazone (ACTOS) 30 MG tablet Take 1 tablet (30 mg total) by mouth once daily. 30 tablet 5    semaglutide " (OZEMPIC) 1 mg/dose (4 mg/3 mL) Inject 1 mg into the skin every 7 days. 3 mL 2    zolpidem (AMBIEN) 5 MG Tab Take 5 mg by mouth nightly as needed.      [DISCONTINUED] losartan (COZAAR) 100 MG tablet Take 1 tablet (100 mg total) by mouth once daily. 90 tablet 3       Social History     Socioeconomic History    Marital status:    Tobacco Use    Smoking status: Former    Smokeless tobacco: Never   Substance and Sexual Activity    Alcohol use: Never    Drug use: Never    Sexual activity: Yes     Partners: Male     Birth control/protection: None     Social Drivers of Health     Financial Resource Strain: High Risk (7/15/2024)    Overall Financial Resource Strain (CARDIA)     Difficulty of Paying Living Expenses: Hard   Food Insecurity: No Food Insecurity (7/15/2024)    Hunger Vital Sign     Worried About Running Out of Food in the Last Year: Never true     Ran Out of Food in the Last Year: Never true   Transportation Needs: No Transportation Needs (6/14/2023)    PRAPARE - Transportation     Lack of Transportation (Medical): No     Lack of Transportation (Non-Medical): No   Physical Activity: Unknown (7/15/2024)    Exercise Vital Sign     Days of Exercise per Week: 0 days   Stress: No Stress Concern Present (7/15/2024)    [place] [employer] - Occupational Stress Questionnaire     Feeling of Stress : Not at all   Housing Stability: Low Risk  (6/14/2023)    Housing Stability Vital Sign     Unable to Pay for Housing in the Last Year: No     Number of Places Lived in the Last Year: 1     Unstable Housing in the Last Year: No        Family History   Problem Relation Name Age of Onset    Heart attack Mother Shauna mataer     Diabetes Mellitus Mother Shauna mataer     Coronary artery disease Mother Shauna robertsmier     Hypertension Mother Shauna robertsmier     Asthma Mother Shauna stephanie     Depression Mother Shauna robertsmier     Learning disabilities Mother Shauna mataer     Lung cancer Father Jayson  "mahi     Diabetes Mellitus Father Jayson champagne     Diabetes Father Jayson champagne     Breast cancer Sister      Diabetes Sister      Breast cancer Sister          Patient Care Team:  Fei Smith DO as PCP - General (Family Medicine)  Care, Jorge L Eye as Consulting Physician (Optometry)  Sandra De La Fuente MD as Consulting Physician (General Surgery)  Earlene Stubbs, RN as Diabetes Educator (Diabetes)     Subjective:     Review of Systems   Constitutional:  Negative for chills and fever.   Respiratory:  Negative for shortness of breath.    Cardiovascular:  Negative for chest pain.   Gastrointestinal:  Positive for abdominal pain. Negative for constipation and diarrhea.   Neurological:  Negative for headaches.   Psychiatric/Behavioral:  Positive for depression. The patient does not have insomnia.        See HPI for details  All Other ROS: Negative except as stated in HPI.       Objective:     /78 (BP Location: Left arm, Patient Position: Sitting)   Pulse 76   Temp 97.4 °F (36.3 °C)   Resp 18   Ht 5' 2" (1.575 m)   Wt 97 kg (213 lb 12.8 oz)   SpO2 98%   BMI 39.10 kg/m²     Physical Exam  Vitals reviewed.   Constitutional:       General: She is not in acute distress.     Appearance: Normal appearance. She is obese.   Cardiovascular:      Rate and Rhythm: Normal rate and regular rhythm.      Heart sounds: No murmur heard.     No friction rub. No gallop.   Pulmonary:      Effort: No respiratory distress.      Breath sounds: No wheezing, rhonchi or rales.   Musculoskeletal:         General: No swelling, tenderness or deformity.      Right lower leg: No edema.      Left lower leg: No edema.   Skin:     General: Skin is warm and dry.      Findings: No lesion or rash.   Neurological:      General: No focal deficit present.      Mental Status: She is alert.   Psychiatric:         Mood and Affect: Affect is tearful.         Assessment/Plan:     1. Primary hypertension  -     valsartan (DIOVAN) 160 MG " tablet; Take 1 tablet (160 mg total) by mouth once daily.  Dispense: 90 tablet; Refill: 3    2. Type 2 diabetes mellitus with hyperglycemia, with long-term current use of insulin  Will check A1C before adjusting medications.   3. Helicobacter pylori gastritis  -if UA negative, will order repeat testing for H pylori   4. Bipolar affective disorder, current episode depressed, current episode severity unspecified  -     venlafaxine (EFFEXOR-XR) 37.5 MG 24 hr capsule; Take 1 capsule (37.5 mg total) by mouth once daily.  Dispense: 30 capsule; Refill: 11    5. Abdominal pain, unspecified abdominal location  -     Urinalysis, Reflex to Urine Culture; Future; Expected date: 01/06/2025          Follow up:     Follow up in about 4 weeks (around 2/3/2025) for Follow up HTN and depression with DR. NAVARRO . In addition to their scheduled follow up, the patient has also been instructed to follow up on as needed basis.

## 2025-01-08 DIAGNOSIS — R11.0 NAUSEA: Primary | ICD-10-CM

## 2025-01-08 RX ORDER — ONDANSETRON 8 MG/1
8 TABLET, ORALLY DISINTEGRATING ORAL EVERY 8 HOURS PRN
Qty: 30 TABLET | Refills: 0 | Status: SHIPPED | OUTPATIENT
Start: 2025-01-08

## 2025-01-13 ENCOUNTER — TELEPHONE (OUTPATIENT)
Dept: FAMILY MEDICINE | Facility: CLINIC | Age: 55
End: 2025-01-13
Payer: MEDICAID

## 2025-01-13 DIAGNOSIS — Z79.4 TYPE 2 DIABETES MELLITUS WITH HYPERGLYCEMIA, WITH LONG-TERM CURRENT USE OF INSULIN: ICD-10-CM

## 2025-01-13 DIAGNOSIS — E11.65 TYPE 2 DIABETES MELLITUS WITH HYPERGLYCEMIA, WITH LONG-TERM CURRENT USE OF INSULIN: ICD-10-CM

## 2025-01-13 RX ORDER — INSULIN GLARGINE 100 [IU]/ML
INJECTION, SOLUTION SUBCUTANEOUS
Qty: 15 ML | Refills: 0 | Status: SHIPPED | OUTPATIENT
Start: 2025-01-13

## 2025-01-13 NOTE — TELEPHONE ENCOUNTER
----- Message from Fei Smith DO sent at 1/11/2025 10:46 AM CST -----  All lab results within acceptable ranges. Will retest for H pylori next.

## 2025-01-24 DIAGNOSIS — Z79.4 TYPE 2 DIABETES MELLITUS WITH HYPERGLYCEMIA, WITH LONG-TERM CURRENT USE OF INSULIN: ICD-10-CM

## 2025-01-24 DIAGNOSIS — E11.65 TYPE 2 DIABETES MELLITUS WITH HYPERGLYCEMIA, WITH LONG-TERM CURRENT USE OF INSULIN: ICD-10-CM

## 2025-01-24 DIAGNOSIS — E11.65 TYPE 2 DIABETES MELLITUS WITH HYPERGLYCEMIA, WITHOUT LONG-TERM CURRENT USE OF INSULIN: ICD-10-CM

## 2025-01-24 RX ORDER — BLOOD-GLUCOSE SENSOR
1 EACH MISCELLANEOUS
Qty: 3 EACH | Refills: 2 | Status: SHIPPED | OUTPATIENT
Start: 2025-01-24 | End: 2026-01-24

## 2025-01-24 RX ORDER — SEMAGLUTIDE 1.34 MG/ML
1 INJECTION, SOLUTION SUBCUTANEOUS
Qty: 3 ML | Refills: 2 | Status: SHIPPED | OUTPATIENT
Start: 2025-01-24 | End: 2025-01-27 | Stop reason: SDUPTHER

## 2025-01-27 DIAGNOSIS — E11.65 TYPE 2 DIABETES MELLITUS WITH HYPERGLYCEMIA, WITH LONG-TERM CURRENT USE OF INSULIN: ICD-10-CM

## 2025-01-27 DIAGNOSIS — Z79.4 TYPE 2 DIABETES MELLITUS WITH HYPERGLYCEMIA, WITH LONG-TERM CURRENT USE OF INSULIN: ICD-10-CM

## 2025-01-27 RX ORDER — SEMAGLUTIDE 1.34 MG/ML
1 INJECTION, SOLUTION SUBCUTANEOUS
Qty: 3 ML | Refills: 2 | Status: SHIPPED | OUTPATIENT
Start: 2025-01-27 | End: 2025-04-27

## 2025-01-28 ENCOUNTER — HOSPITAL ENCOUNTER (OUTPATIENT)
Dept: RADIOLOGY | Facility: HOSPITAL | Age: 55
Discharge: HOME | End: 2025-01-28
Attending: FAMILY MEDICINE
Payer: MEDICAID

## 2025-01-28 ENCOUNTER — EXTERNAL HOME HEALTH (OUTPATIENT)
Dept: HOME HEALTH SERVICES | Facility: HOSPITAL | Age: 55
End: 2025-01-28
Payer: MEDICAID

## 2025-01-28 DIAGNOSIS — Z12.31 BREAST CANCER SCREENING BY MAMMOGRAM: ICD-10-CM

## 2025-01-28 DIAGNOSIS — E11.65 TYPE 2 DIABETES MELLITUS WITH HYPERGLYCEMIA, WITHOUT LONG-TERM CURRENT USE OF INSULIN: Chronic | ICD-10-CM

## 2025-01-28 DIAGNOSIS — I10 PRIMARY HYPERTENSION: Primary | ICD-10-CM

## 2025-01-28 LAB — H. PYLORI STOOL: NEGATIVE

## 2025-01-28 RX ORDER — PIOGLITAZONEHYDROCHLORIDE 30 MG/1
30 TABLET ORAL DAILY
Qty: 30 TABLET | Refills: 5 | Status: SHIPPED | OUTPATIENT
Start: 2025-01-28

## 2025-01-28 RX ORDER — VALSARTAN 40 MG/1
40 TABLET ORAL DAILY
Qty: 90 TABLET | Refills: 3 | Status: SHIPPED | OUTPATIENT
Start: 2025-01-28 | End: 2026-01-28

## 2025-01-30 ENCOUNTER — TELEPHONE (OUTPATIENT)
Dept: FAMILY MEDICINE | Facility: CLINIC | Age: 55
End: 2025-01-30
Payer: MEDICAID

## 2025-01-30 NOTE — TELEPHONE ENCOUNTER
----- Message from Fei Smith DO sent at 1/30/2025  2:14 PM CST -----  All lab results within acceptable ranges.

## 2025-02-03 ENCOUNTER — OFFICE VISIT (OUTPATIENT)
Dept: FAMILY MEDICINE | Facility: CLINIC | Age: 55
End: 2025-02-03
Payer: MEDICAID

## 2025-02-03 VITALS
BODY MASS INDEX: 38.16 KG/M2 | WEIGHT: 207.38 LBS | RESPIRATION RATE: 19 BRPM | SYSTOLIC BLOOD PRESSURE: 102 MMHG | DIASTOLIC BLOOD PRESSURE: 76 MMHG | TEMPERATURE: 97 F | HEART RATE: 88 BPM | HEIGHT: 62 IN | OXYGEN SATURATION: 99 %

## 2025-02-03 DIAGNOSIS — E11.65 TYPE 2 DIABETES MELLITUS WITH HYPERGLYCEMIA, WITH LONG-TERM CURRENT USE OF INSULIN: Primary | ICD-10-CM

## 2025-02-03 DIAGNOSIS — Z79.4 TYPE 2 DIABETES MELLITUS WITH HYPERGLYCEMIA, WITH LONG-TERM CURRENT USE OF INSULIN: Primary | ICD-10-CM

## 2025-02-03 DIAGNOSIS — I10 PRIMARY HYPERTENSION: Chronic | ICD-10-CM

## 2025-02-03 PROCEDURE — 4010F ACE/ARB THERAPY RXD/TAKEN: CPT | Mod: CPTII,,, | Performed by: FAMILY MEDICINE

## 2025-02-03 PROCEDURE — 1160F RVW MEDS BY RX/DR IN RCRD: CPT | Mod: CPTII,,, | Performed by: FAMILY MEDICINE

## 2025-02-03 PROCEDURE — 3008F BODY MASS INDEX DOCD: CPT | Mod: CPTII,,, | Performed by: FAMILY MEDICINE

## 2025-02-03 PROCEDURE — 3078F DIAST BP <80 MM HG: CPT | Mod: CPTII,,, | Performed by: FAMILY MEDICINE

## 2025-02-03 PROCEDURE — 3044F HG A1C LEVEL LT 7.0%: CPT | Mod: CPTII,,, | Performed by: FAMILY MEDICINE

## 2025-02-03 PROCEDURE — 99214 OFFICE O/P EST MOD 30 MIN: CPT | Mod: ,,, | Performed by: FAMILY MEDICINE

## 2025-02-03 PROCEDURE — 3074F SYST BP LT 130 MM HG: CPT | Mod: CPTII,,, | Performed by: FAMILY MEDICINE

## 2025-02-03 PROCEDURE — 1159F MED LIST DOCD IN RCRD: CPT | Mod: CPTII,,, | Performed by: FAMILY MEDICINE

## 2025-02-14 NOTE — PROGRESS NOTES
Patient ID: [id number]     Chief Complaint: Follow-up (1 month follow up for HTN, Has been running high with home check. 141/93,156/90) and Diabetes (1 month follow up. )    HPI:     Helena Vazquez is a 55 y.o. female here today for Follow-up (1 month follow up for HTN, Has been running high with home check. 141/93,156/90) and Diabetes (1 month follow up. ). Blood pressure on lower range of normal today in office.       -------------------------------------    Asthma    Bipolar disorder, unspecified    Diabetes mellitus type 2 in obese    DUB (dysfunctional uterine bleeding)    Edema leg    Family history of breast cancer in sister    x2    Hepatic steatosis    History of infection due to human papilloma virus (HPV)    HLD (hyperlipidemia)    HTN (hypertension)    Hypercholesterolemia    Hyperlipidemia due to type 2 diabetes mellitus    Hypertriglyceridemia    WALTER (iron deficiency anemia)    Microcytic anemia    Morbid obesity    Schizoaffective disorder    Type 2 diabetes mellitus    Type 2 diabetes mellitus with hyperglycemia    Urge incontinence of urine    Vision blurred        Past Surgical History:   Procedure Laterality Date     SECTION  1994     SECTION  1991     SECTION WITH TUBAL LIGATION  1998    CHOLECYSTECTOMY      ESOPHAGOGASTRODUODENOSCOPY N/A 2024    Procedure: EGD (ESOPHAGOGASTRODUODENOSCOPY);  Surgeon: Sandra De La Fuente MD;  Location: [facility];  Service: General;  Laterality: N/A;    HERNIA REPAIR      TUBAL LIGATION         Review of patient's allergies indicates:  No Known Allergies    Outpatient Medications Marked as Taking for the 2/3/25 encounter (Office Visit) with Fei Smith,    Medication Sig Dispense Refill    albuterol-ipratropium (DUO-NEB) 2.5 mg-0.5 mg/3 mL nebulizer solution Take 3 mLs by nebulization every 6 (six) hours as needed for Wheezing. Rescue 75 mL 5    alcohol antiseptic pads (ALCOHOL SWABS TOP)   alcohol swabs,  "See Instructions, use alcohol swab on injection site prior to injection, # 1 box(es), 3 Refill(s), Pharmacy: [facility], 155, cm, Height/Length Dosing, 02/09/22 9:59:00 CST, 116.4, kg, Weight Dosing, 02/09/22 9:59...      amLODIPine (NORVASC) 10 MG tablet Take 1 tablet (10 mg total) by mouth once daily. 90 tablet 0    atorvastatin (LIPITOR) 80 MG tablet Take 1 tablet (80 mg total) by mouth once daily. 90 tablet 3    blood sugar diagnostic Strp 1 each by Misc.(Non-Drug; Combo Route) route every 10 days. 3 each 3    blood-glucose sensor (DEXCOM G7 SENSOR) Megan 1 each by Misc.(Non-Drug; Combo Route) route every 10 days. 3 each 2    famotidine (PEPCID) 20 MG tablet Take 20 mg by mouth 2 (two) times daily.      fenofibrate (TRICOR) 54 MG tablet Take 1 tablet (54 mg total) by mouth once daily. 90 tablet 3    glipiZIDE (GLUCOTROL) 10 MG tablet TAKE 1 TABLET BY MOUTH 2 TIMES A DAY 60 tablet 5    hydroCHLOROthiazide (HYDRODIURIL) 25 MG tablet TAKE ONE TABLET BY MOUTH ONCE DAILY 30 tablet 2    JARDIANCE 25 mg tablet TAKE ONE TABLET BY MOUTH EVERY MORNING 30 tablet 5    LANTUS SOLOSTAR U-100 INSULIN 100 unit/mL (3 mL) InPn pen INJECT 22 UNITS UNDER THE SKIN EVERY EVENING (Patient taking differently: If in the( 100) 26 units, If its 200 ( 28 units)) 15 mL 0    ondansetron (ZOFRAN-ODT) 8 MG TbDL Take 1 tablet (8 mg total) by mouth every 8 (eight) hours as needed (nausea/ vomiting). 30 tablet 0    oxybutynin (DITROPAN) 5 MG Tab TAKE ONE TABLET BY MOUTH THREE TIMES A DAY 90 tablet 3    pantoprazole (PROTONIX) 40 MG tablet Take 1 tablet (40 mg total) by mouth 2 (two) times daily. For acid reflux 180 tablet 0    pen needle, diabetic 31 gauge x 3/16" Ndle 1 Needle by Misc.(Non-Drug; Combo Route) route once daily. 50 each 2    pioglitazone (ACTOS) 30 MG tablet Take 1 tablet (30 mg total) by mouth once daily. 30 tablet 5    semaglutide (OZEMPIC) 1 mg/dose (4 mg/3 mL) Inject 1 mg into the skin every 7 days. 3 mL 2 "    valsartan (DIOVAN) 160 MG tablet Take 1 tablet (160 mg total) by mouth once daily. 90 tablet 3    valsartan (DIOVAN) 40 MG tablet Take 1 tablet (40 mg total) by mouth once daily. Take with 160mg tablet. 90 tablet 3    venlafaxine (EFFEXOR-XR) 37.5 MG 24 hr capsule Take 1 capsule (37.5 mg total) by mouth once daily. 30 capsule 11    zolpidem (AMBIEN) 5 MG Tab Take 5 mg by mouth nightly as needed.         Social History     Socioeconomic History    Marital status:    Tobacco Use    Smoking status: Former    Smokeless tobacco: Never   Substance and Sexual Activity    Alcohol use: Never    Drug use: Never    Sexual activity: Yes     Partners: Male     Birth control/protection: None     Social Drivers of Health     Financial Resource Strain: High Risk (7/15/2024)    Overall Financial Resource Strain (CARDIA)     Difficulty of Paying Living Expenses: Hard   Food Insecurity: No Food Insecurity (7/15/2024)    Hunger Vital Sign     Worried About Running Out of Food in the Last Year: Never true     Ran Out of Food in the Last Year: Never true   Transportation Needs: No Transportation Needs (6/14/2023)    PRAPARE - Transportation     Lack of Transportation (Medical): No     Lack of Transportation (Non-Medical): No   Physical Activity: Unknown (7/15/2024)    Exercise Vital Sign     Days of Exercise per Week: 0 days   Stress: No Stress Concern Present (7/15/2024)    [place] [employer] - Occupational Stress Questionnaire     Feeling of Stress : Not at all   Housing Stability: Low Risk  (6/14/2023)    Housing Stability Vital Sign     Unable to Pay for Housing in the Last Year: No     Number of Places Lived in the Last Year: 1     Unstable Housing in the Last Year: No        Family History   Problem Relation Name Age of Onset    Heart attack Mother Shauna brown     Diabetes Mellitus Mother Shauna brown     Coronary artery disease Mother Shauna brown     Hypertension Mother Shauna brown      "Asthma Mother Shauna robertsmier     Depression Mother Shauna robertsmier     Learning disabilities Mother Shauna mataer     Lung cancer Father Jayson champagne     Diabetes Mellitus Father Jayson champagne     Diabetes Father Jayson champagne     Breast cancer Sister      Diabetes Sister      Breast cancer Sister          Patient Care Team:  Fei Smith DO as PCP - General (Family Medicine)  Care, Jorge L Eye as Consulting Physician (Optometry)  Sandra De La Fuente MD as Consulting Physician (General Surgery)  Earlene Stubbs RN as Diabetes Educator (Diabetes)     Subjective:     ROS    See HPI for details  All Other ROS: Negative except as stated in HPI.       Objective:     /76 (BP Location: Left arm, Patient Position: Sitting)   Pulse 88   Temp 97.3 °F (36.3 °C)   Resp 19   Ht 5' 2" (1.575 m)   Wt 94.1 kg (207 lb 6.4 oz)   SpO2 99%   BMI 37.93 kg/m²     Physical Exam  Vitals reviewed.   Constitutional:       General: She is not in acute distress.     Appearance: Normal appearance.   Cardiovascular:      Rate and Rhythm: Normal rate and regular rhythm.      Heart sounds: No murmur heard.     No friction rub. No gallop.   Pulmonary:      Effort: No respiratory distress.      Breath sounds: No wheezing, rhonchi or rales.   Musculoskeletal:         General: No swelling, tenderness or deformity.      Right lower leg: No edema.      Left lower leg: No edema.   Skin:     General: Skin is warm and dry.      Findings: No lesion or rash.   Neurological:      General: No focal deficit present.      Mental Status: She is alert.   Psychiatric:         Mood and Affect: Mood normal.         Assessment/Plan:     1. Type 2 diabetes mellitus with hyperglycemia, with long-term current use of insulin  -continue current medications. Recent A1C 6.5.   2. Primary hypertension  -continue current medications. Suspect elevations reported by home health may be related to anxiety patient experiences from having home health in her home. "         Follow up:     Follow up in about 3 months (around 5/3/2025) for Follow up HTN, Diabetes, with Dr. NAVARRO. Discuss PAP at this visit. In addition to their scheduled follow up, the patient has also been instructed to follow up on as needed basis.

## 2025-02-24 DIAGNOSIS — Z79.4 TYPE 2 DIABETES MELLITUS WITH HYPERGLYCEMIA, WITH LONG-TERM CURRENT USE OF INSULIN: ICD-10-CM

## 2025-02-24 DIAGNOSIS — I1A.0 RESISTANT HYPERTENSION: Primary | ICD-10-CM

## 2025-02-24 DIAGNOSIS — E11.65 TYPE 2 DIABETES MELLITUS WITH HYPERGLYCEMIA, WITH LONG-TERM CURRENT USE OF INSULIN: ICD-10-CM

## 2025-02-24 NOTE — PROGRESS NOTES
Reviewed home BP logs provided by home health agency. Will try to order home sleep study to assess for possible sleep apnea given patient's resistant hypertension which appears to be primarily in the morning and improves throughout the day.

## 2025-02-26 ENCOUNTER — TELEPHONE (OUTPATIENT)
Dept: DIABETES | Facility: CLINIC | Age: 55
End: 2025-02-26
Payer: MEDICAID

## 2025-02-26 NOTE — TELEPHONE ENCOUNTER
Called pt to r/s appt with Earlene Stubbs for diabetes education  Left message with callback number

## 2025-03-07 ENCOUNTER — DOCUMENT SCAN (OUTPATIENT)
Dept: HOME HEALTH SERVICES | Facility: HOSPITAL | Age: 55
End: 2025-03-07
Payer: MEDICAID

## 2025-03-12 DIAGNOSIS — E11.65 TYPE 2 DIABETES MELLITUS WITH HYPERGLYCEMIA, WITHOUT LONG-TERM CURRENT USE OF INSULIN: ICD-10-CM

## 2025-03-13 ENCOUNTER — TELEPHONE (OUTPATIENT)
Dept: FAMILY MEDICINE | Facility: CLINIC | Age: 55
End: 2025-03-13
Payer: MEDICAID

## 2025-03-13 NOTE — TELEPHONE ENCOUNTER
Abigail with Home health called. Helena is experiencing epigastric pain Left upper Quad. Tender to touch. No vomiting. Stool are the same having 1 QD. Wants to know what to do with her. Has missed several GI referral appointments in th past.

## 2025-03-16 DIAGNOSIS — I10 PRIMARY HYPERTENSION: ICD-10-CM

## 2025-03-17 ENCOUNTER — DOCUMENT SCAN (OUTPATIENT)
Dept: HOME HEALTH SERVICES | Facility: HOSPITAL | Age: 55
End: 2025-03-17
Payer: MEDICAID

## 2025-03-18 RX ORDER — HYDROCHLOROTHIAZIDE 25 MG/1
25 TABLET ORAL
Qty: 30 TABLET | Refills: 2 | Status: SHIPPED | OUTPATIENT
Start: 2025-03-18

## 2025-03-26 ENCOUNTER — EXTERNAL HOME HEALTH (OUTPATIENT)
Dept: HOME HEALTH SERVICES | Facility: HOSPITAL | Age: 55
End: 2025-03-26
Payer: MEDICAID

## 2025-03-26 ENCOUNTER — TELEPHONE (OUTPATIENT)
Dept: FAMILY MEDICINE | Facility: CLINIC | Age: 55
End: 2025-03-26
Payer: MEDICAID

## 2025-03-26 NOTE — TELEPHONE ENCOUNTER
"Abigail with [facility] called : Helena is vomiting x3 this am ( white in color). C/o ABD pain that is radiating to Left side.   No diarrhea No fever but reporting chills. T- 98.4, P-89, BP-110/78.  Dr. Smith offered an appt today at 1:20 and another one after 2:00pm. Helena refused both appts.   Abigail  and this staff encouraged her to come in but she continues to refuse. Abigail told Helena "then you gonna have to go to the hospital if it get worse. "   Will update as needed.   "

## 2025-03-28 LAB — BCS RECOMMENDATION EXT: NORMAL

## 2025-03-30 DIAGNOSIS — N39.41 URGE INCONTINENCE OF URINE: Chronic | ICD-10-CM

## 2025-03-30 DIAGNOSIS — E11.65 TYPE 2 DIABETES MELLITUS WITH HYPERGLYCEMIA, WITHOUT LONG-TERM CURRENT USE OF INSULIN: Chronic | ICD-10-CM

## 2025-03-30 DIAGNOSIS — Z79.4 TYPE 2 DIABETES MELLITUS WITH HYPERGLYCEMIA, WITH LONG-TERM CURRENT USE OF INSULIN: Primary | ICD-10-CM

## 2025-03-30 DIAGNOSIS — E11.65 TYPE 2 DIABETES MELLITUS WITH HYPERGLYCEMIA, WITH LONG-TERM CURRENT USE OF INSULIN: Primary | ICD-10-CM

## 2025-03-31 RX ORDER — GLIPIZIDE 10 MG/1
10 TABLET ORAL 2 TIMES DAILY
Qty: 60 TABLET | Refills: 5 | Status: SHIPPED | OUTPATIENT
Start: 2025-03-31

## 2025-03-31 RX ORDER — PEN NEEDLE, DIABETIC 32GX 5/32"
NEEDLE, DISPOSABLE MISCELLANEOUS
Qty: 100 EACH | Refills: 2 | Status: SHIPPED | OUTPATIENT
Start: 2025-03-31

## 2025-03-31 RX ORDER — OXYBUTYNIN CHLORIDE 5 MG/1
5 TABLET ORAL 3 TIMES DAILY
Qty: 90 TABLET | Refills: 3 | Status: SHIPPED | OUTPATIENT
Start: 2025-03-31

## 2025-04-02 ENCOUNTER — DOCUMENTATION ONLY (OUTPATIENT)
Dept: FAMILY MEDICINE | Facility: CLINIC | Age: 55
End: 2025-04-02
Payer: MEDICAID

## 2025-04-03 ENCOUNTER — TELEPHONE (OUTPATIENT)
Dept: FAMILY MEDICINE | Facility: CLINIC | Age: 55
End: 2025-04-03
Payer: MEDICAID

## 2025-04-03 ENCOUNTER — RESULTS FOLLOW-UP (OUTPATIENT)
Dept: FAMILY MEDICINE | Facility: CLINIC | Age: 55
End: 2025-04-03

## 2025-04-03 NOTE — TELEPHONE ENCOUNTER
----- Message from Fei Smith DO sent at 4/3/2025  1:07 PM CDT -----  Normal MMG. Repeat in 1 year.  ----- Message -----  From: Earlene Otto MA  Sent: 4/2/2025  10:51 AM CDT  To: Fei Smith DO

## 2025-04-08 ENCOUNTER — DOCUMENTATION ONLY (OUTPATIENT)
Dept: SLEEP MEDICINE | Facility: HOSPITAL | Age: 55
End: 2025-04-08
Payer: MEDICAID

## 2025-04-13 DIAGNOSIS — E11.65 TYPE 2 DIABETES MELLITUS WITH HYPERGLYCEMIA, WITHOUT LONG-TERM CURRENT USE OF INSULIN: ICD-10-CM

## 2025-04-14 RX ORDER — BLOOD-GLUCOSE SENSOR
EACH MISCELLANEOUS
Qty: 3 EACH | Refills: 2 | Status: SHIPPED | OUTPATIENT
Start: 2025-04-14

## 2025-04-23 ENCOUNTER — TELEPHONE (OUTPATIENT)
Dept: FAMILY MEDICINE | Facility: CLINIC | Age: 55
End: 2025-04-23
Payer: MEDICAID

## 2025-04-23 DIAGNOSIS — E78.2 MIXED HYPERLIPIDEMIA: Chronic | ICD-10-CM

## 2025-04-23 RX ORDER — ATORVASTATIN CALCIUM 80 MG/1
80 TABLET, FILM COATED ORAL DAILY
Qty: 90 TABLET | Refills: 3 | Status: SHIPPED | OUTPATIENT
Start: 2025-04-23

## 2025-04-23 NOTE — TELEPHONE ENCOUNTER
----- Message from Gianfranco Rodrigues sent at 4/23/2025 10:06 AM CDT -----  Who Called: Helena VazquezRefill or New Rx:RefillRX Name and Strength:atorvastatin (LIPITOR) 80 MG tabletHow is the patient currently taking it? (ex. 1XDay):1/dailyIs this a 30 day or 90 day RX:90 dayLocal or Mail Order:localList of preferred pharmacies on file (remove unneeded): [facility] [address] Provider:Kevonferquita Method of Contact: Phone CallPatient's Preferred Phone Number on File: [phone] Best Call Back Number, if different:Additional Information:

## 2025-04-24 ENCOUNTER — RESULTS FOLLOW-UP (OUTPATIENT)
Dept: FAMILY MEDICINE | Facility: CLINIC | Age: 55
End: 2025-04-24
Payer: MEDICAID

## 2025-04-24 PROBLEM — G47.33 OSA (OBSTRUCTIVE SLEEP APNEA): Status: ACTIVE | Noted: 2025-04-24

## 2025-05-05 ENCOUNTER — OFFICE VISIT (OUTPATIENT)
Dept: FAMILY MEDICINE | Facility: CLINIC | Age: 55
End: 2025-05-05
Payer: MEDICAID

## 2025-05-05 VITALS
WEIGHT: 208 LBS | DIASTOLIC BLOOD PRESSURE: 87 MMHG | SYSTOLIC BLOOD PRESSURE: 136 MMHG | BODY MASS INDEX: 38.28 KG/M2 | OXYGEN SATURATION: 98 % | RESPIRATION RATE: 20 BRPM | HEART RATE: 74 BPM | HEIGHT: 62 IN | TEMPERATURE: 97 F

## 2025-05-05 DIAGNOSIS — Z12.4 ENCOUNTER FOR PAPANICOLAOU SMEAR FOR CERVICAL CANCER SCREENING: ICD-10-CM

## 2025-05-05 DIAGNOSIS — Z79.4 TYPE 2 DIABETES MELLITUS WITH HYPERGLYCEMIA, WITH LONG-TERM CURRENT USE OF INSULIN: Primary | ICD-10-CM

## 2025-05-05 DIAGNOSIS — I10 PRIMARY HYPERTENSION: Chronic | ICD-10-CM

## 2025-05-05 DIAGNOSIS — E11.65 TYPE 2 DIABETES MELLITUS WITH HYPERGLYCEMIA, WITH LONG-TERM CURRENT USE OF INSULIN: Primary | ICD-10-CM

## 2025-05-05 RX ORDER — VALSARTAN 160 MG/1
160 TABLET ORAL DAILY
Qty: 90 TABLET | Refills: 3 | Status: SHIPPED | OUTPATIENT
Start: 2025-05-05 | End: 2026-05-05

## 2025-05-05 RX ORDER — VALSARTAN 40 MG/1
40 TABLET ORAL DAILY
Qty: 90 TABLET | Refills: 3 | Status: SHIPPED | OUTPATIENT
Start: 2025-05-05 | End: 2026-05-05

## 2025-05-05 RX ORDER — INSULIN GLARGINE 100 [IU]/ML
26 INJECTION, SOLUTION SUBCUTANEOUS DAILY
Qty: 23.4 ML | Refills: 3 | Status: SHIPPED | OUTPATIENT
Start: 2025-05-05 | End: 2026-05-05

## 2025-05-05 RX ORDER — INSULIN GLARGINE 100 [IU]/ML
26 INJECTION, SOLUTION SUBCUTANEOUS DAILY
COMMUNITY
End: 2025-05-05 | Stop reason: SDUPTHER

## 2025-05-05 NOTE — PROGRESS NOTES
"   Patient ID: [id number]     Chief Complaint: Diabetes, Hypertension (" Good, its good" ), and PAP (Cousin Lacey Martino stated she can bring her to the appt as long as its in the am. )    HPI:     Helena Vazquez is a 55 y.o. female here today for Diabetes, Hypertension (" Good, its good" ), and PAP (Cousin Lacey Martino stated she can bring her to the appt as long as its in the am. ). Recently evaluated by GI who suspects gastroparesis. Prescribed reglan and advised to stop the Ozempic. Her ozempic was stopped in the past with no resolution of her symptoms though this may have been due to H. Pylori infection at the time.   History of Present Illness               -------------------------------------    Asthma    Bipolar disorder, unspecified    Diabetes mellitus type 2 in obese    DUB (dysfunctional uterine bleeding)    Edema leg    Family history of breast cancer in sister    x2    Hepatic steatosis    History of infection due to human papilloma virus (HPV)    HLD (hyperlipidemia)    HTN (hypertension)    Hypercholesterolemia    Hyperlipidemia due to type 2 diabetes mellitus    Hypertriglyceridemia    WALTER (iron deficiency anemia)    Microcytic anemia    Morbid obesity    Schizoaffective disorder    Type 2 diabetes mellitus    Type 2 diabetes mellitus with hyperglycemia    Urge incontinence of urine    Vision blurred        Past Surgical History:   Procedure Laterality Date     SECTION  1994     SECTION  1991     SECTION WITH TUBAL LIGATION  1998    CHOLECYSTECTOMY      ESOPHAGOGASTRODUODENOSCOPY N/A 2024    Procedure: EGD (ESOPHAGOGASTRODUODENOSCOPY);  Surgeon: Sandra De La Fuente MD;  Location: [facility] [place];  Service: General;  Laterality: N/A;    HERNIA REPAIR      TUBAL LIGATION         Review of patient's allergies indicates:  No Known Allergies    Outpatient Medications Marked as Taking for the 25 encounter (Office Visit) with Fei Smith DO " "  Medication Sig Dispense Refill    albuterol-ipratropium (DUO-NEB) 2.5 mg-0.5 mg/3 mL nebulizer solution Take 3 mLs by nebulization every 6 (six) hours as needed for Wheezing. Rescue 75 mL 5    alcohol antiseptic pads (ALCOHOL SWABS TOP)   alcohol swabs, See Instructions, use alcohol swab on injection site prior to injection, # 1 box(es), 3 Refill(s), Pharmacy: [facility], 155, cm, Height/Length Dosing, 02/09/22 9:59:00 CST, 116.4, kg, Weight Dosing, 02/09/22 9:59...      amLODIPine (NORVASC) 10 MG tablet Take 1 tablet (10 mg total) by mouth once daily. 90 tablet 0    atorvastatin (LIPITOR) 80 MG tablet Take 1 tablet (80 mg total) by mouth once daily. 90 tablet 3    DEXCOM G7 SENSOR Megan USE ONE EVERY 10 DAYS 3 each 2    empagliflozin (JARDIANCE) 25 mg tablet Take 1 tablet (25 mg total) by mouth every morning. 30 tablet 5    famotidine (PEPCID) 20 MG tablet Take 20 mg by mouth 2 (two) times daily.      fenofibrate (TRICOR) 54 MG tablet Take 1 tablet (54 mg total) by mouth once daily. 90 tablet 3    glipiZIDE (GLUCOTROL) 10 MG tablet TAKE 1 TABLET BY MOUTH 2 TIMES A DAY 60 tablet 5    hydroCHLOROthiazide (HYDRODIURIL) 25 MG tablet TAKE ONE TABLET BY MOUTH ONCE DAILY 30 tablet 2    ondansetron (ZOFRAN-ODT) 8 MG TbDL Take 1 tablet (8 mg total) by mouth every 8 (eight) hours as needed (nausea/ vomiting). 30 tablet 0    oxybutynin (DITROPAN) 5 MG Tab TAKE ONE TABLET BY MOUTH THREE TIMES A DAY 90 tablet 3    pantoprazole (PROTONIX) 40 MG tablet Take 1 tablet (40 mg total) by mouth 2 (two) times daily. For acid reflux 180 tablet 0    pen needle, diabetic (SURE COMFORT PEN NEEDLE) 32 gauge x 1/4" Ndle AS DIRECTED ONCE DAILY FOR LANTUS SOLOSTAR 100 each 2    pen needle, diabetic 31 gauge x 3/16" Ndle 1 Needle by Misc.(Non-Drug; Combo Route) route once daily. 50 each 2    pioglitazone (ACTOS) 30 MG tablet Take 1 tablet (30 mg total) by mouth once daily. 30 tablet 5    venlafaxine (EFFEXOR-XR) 37.5 MG " "24 hr capsule Take 1 capsule (37.5 mg total) by mouth once daily. 30 capsule 11    zolpidem (AMBIEN) 5 MG Tab Take 5 mg by mouth nightly as needed.      [DISCONTINUED] valsartan (DIOVAN) 160 MG tablet Take 1 tablet (160 mg total) by mouth once daily. 90 tablet 3    [DISCONTINUED] valsartan (DIOVAN) 40 MG tablet Take 1 tablet (40 mg total) by mouth once daily. Take with 160mg tablet. 90 tablet 3       Social History[1]     Family History   Problem Relation Name Age of Onset    Heart attack Mother Shauna stephanie     Diabetes Mellitus Mother Shauna stephanie     Coronary artery disease Mother Shauna stephanie     Hypertension Mother Shauna stephanie     Asthma Mother Shauna stephanie     Depression Mother Shauna stephanie     Learning disabilities Mother Shauna stephanie     Lung cancer Father Jayson champagne     Diabetes Mellitus Father Jayson champagne     Diabetes Father Jayson champagne     Breast cancer Sister      Diabetes Sister      Breast cancer Sister          Patient Care Team:  Fei Smith DO as PCP - General (Family Medicine)  [facility], [facility] as Consulting Physician (Optometry)  Sandra De La Fuente MD as Consulting Physician (General Surgery)  Earlene Stubbs, RN as Diabetes Educator (Diabetes)     Subjective:     ROS    See HPI for details  All Other ROS: Negative except as stated in HPI.       Objective:     /87 (BP Location: Left arm, Patient Position: Sitting)   Pulse 74   Temp 97.3 °F (36.3 °C)   Resp 20   Ht 5' 2" (1.575 m)   Wt 94.3 kg (208 lb)   SpO2 98%   BMI 38.04 kg/m²     Physical Exam  Vitals reviewed.   Constitutional:       General: She is not in acute distress.     Appearance: Normal appearance. She is obese.   Cardiovascular:      Rate and Rhythm: Normal rate and regular rhythm.      Heart sounds: No murmur heard.     No friction rub. No gallop.   Pulmonary:      Effort: No respiratory distress.      Breath sounds: No wheezing, rhonchi or rales.   Musculoskeletal:         General: No " swelling, tenderness or deformity.      Right lower leg: No edema.      Left lower leg: No edema.   Skin:     General: Skin is warm and dry.      Findings: No lesion or rash.   Neurological:      General: No focal deficit present.      Mental Status: She is alert.   Psychiatric:         Mood and Affect: Mood normal.         Assessment/Plan:     1. Type 2 diabetes mellitus with hyperglycemia, with long-term current use of insulin  -     insulin glargine U-100, Lantus, (LANTUS SOLOSTAR U-100 INSULIN) 100 unit/mL (3 mL) InPn pen; Inject 26 Units into the skin once daily. If in the 100s and  28 units if its in the 200s  Dispense: 23.4 mL; Refill: 3  -will try stopping the ozempic again to see if GI symptoms resolve. Advised patient to contact clinic next week with an update.   2. Primary hypertension  -     valsartan (DIOVAN) 160 MG tablet; Take 1 tablet (160 mg total) by mouth once daily.  Dispense: 90 tablet; Refill: 3  -     valsartan (DIOVAN) 40 MG tablet; Take 1 tablet (40 mg total) by mouth once daily. Take with 160mg tablet.  Dispense: 90 tablet; Refill: 3    3. Encounter for Papanicolaou smear for cervical cancer screening  -     Ambulatory referral/consult to Obstetrics / Gynecology; Future; Expected date: 05/05/2025        Assessment & Plan               This note was generated with the assistance of ambient listening technology. Verbal consent was obtained by the patient and accompanying visitor(s) for the recording of patient appointment to facilitate this note. I attest to having reviewed and edited the generated note for accuracy, though some syntax or spelling errors may persist. Please contact the author of this note for any clarification.     Follow up:     Follow up in about 2 months (around 7/5/2025) for Wellness, Follow up diabetes . In addition to their scheduled follow up, the patient has also been instructed to follow up on as needed basis.          [1]   Social History  Socioeconomic History     Marital status:    Tobacco Use    Smoking status: Former    Smokeless tobacco: Never   Substance and Sexual Activity    Alcohol use: Never    Drug use: Never    Sexual activity: Yes     Partners: Male     Birth control/protection: None     Social Drivers of Health     Financial Resource Strain: High Risk (7/15/2024)    Overall Financial Resource Strain (CARDIA)     Difficulty of Paying Living Expenses: Hard   Food Insecurity: No Food Insecurity (7/15/2024)    Hunger Vital Sign     Worried About Running Out of Food in the Last Year: Never true     Ran Out of Food in the Last Year: Never true   Transportation Needs: No Transportation Needs (6/14/2023)    PRAPARE - Transportation     Lack of Transportation (Medical): No     Lack of Transportation (Non-Medical): No   Physical Activity: Unknown (7/15/2024)    Exercise Vital Sign     Days of Exercise per Week: 0 days   Stress: No Stress Concern Present (7/15/2024)    [employer] - Occupational Stress Questionnaire     Feeling of Stress : Not at all   Housing Stability: Low Risk  (6/14/2023)    Housing Stability Vital Sign     Unable to Pay for Housing in the Last Year: No     Number of Places Lived in the Last Year: 1     Unstable Housing in the Last Year: No

## 2025-05-09 ENCOUNTER — HOSPITAL ENCOUNTER (OUTPATIENT)
Dept: RADIOLOGY | Facility: HOSPITAL | Age: 55
Discharge: HOME | End: 2025-05-09
Attending: INTERNAL MEDICINE
Payer: MEDICAID

## 2025-05-09 DIAGNOSIS — R52 PAIN: Primary | ICD-10-CM

## 2025-05-09 DIAGNOSIS — R14.0 BLOATING: ICD-10-CM

## 2025-05-09 DIAGNOSIS — K76.0 METABOLIC DYSFUNCTION-ASSOCIATED FATTY LIVER DISEASE (MAFLD): ICD-10-CM

## 2025-05-09 DIAGNOSIS — K76.9 LESION OF LIVER: ICD-10-CM

## 2025-05-09 PROCEDURE — 25500020 PHARM REV CODE 255: Performed by: INTERNAL MEDICINE

## 2025-05-09 PROCEDURE — 74170 CT ABD WO CNTRST FLWD CNTRST: CPT | Mod: TC

## 2025-05-09 RX ORDER — IOPAMIDOL 755 MG/ML
100 INJECTION, SOLUTION INTRAVASCULAR
Status: COMPLETED | OUTPATIENT
Start: 2025-05-09 | End: 2025-05-09

## 2025-05-09 RX ADMIN — IOPAMIDOL 100 ML: 755 INJECTION, SOLUTION INTRAVENOUS at 09:05

## 2025-05-12 ENCOUNTER — DOCUMENT SCAN (OUTPATIENT)
Dept: HOME HEALTH SERVICES | Facility: HOSPITAL | Age: 55
End: 2025-05-12
Payer: MEDICAID

## 2025-05-29 ENCOUNTER — DOCUMENT SCAN (OUTPATIENT)
Dept: HOME HEALTH SERVICES | Facility: HOSPITAL | Age: 55
End: 2025-05-29
Payer: MEDICAID

## 2025-06-04 ENCOUNTER — CLINICAL SUPPORT (OUTPATIENT)
Facility: CLINIC | Age: 55
End: 2025-06-04
Payer: MEDICAID

## 2025-06-04 VITALS — BODY MASS INDEX: 38.64 KG/M2 | HEIGHT: 62 IN | WEIGHT: 210 LBS

## 2025-06-04 DIAGNOSIS — E11.65 TYPE 2 DIABETES MELLITUS WITH HYPERGLYCEMIA, WITH LONG-TERM CURRENT USE OF INSULIN: Primary | ICD-10-CM

## 2025-06-04 DIAGNOSIS — Z79.4 TYPE 2 DIABETES MELLITUS WITH HYPERGLYCEMIA, WITH LONG-TERM CURRENT USE OF INSULIN: Primary | ICD-10-CM

## 2025-06-04 DIAGNOSIS — E11.65 TYPE 2 DIABETES MELLITUS WITH HYPERGLYCEMIA, WITHOUT LONG-TERM CURRENT USE OF INSULIN: Chronic | ICD-10-CM

## 2025-06-04 PROCEDURE — G0108 DIAB MANAGE TRN  PER INDIV: HCPCS | Mod: S$GLB,,, | Performed by: FAMILY MEDICINE

## 2025-06-05 ENCOUNTER — TELEPHONE (OUTPATIENT)
Dept: FAMILY MEDICINE | Facility: CLINIC | Age: 55
End: 2025-06-05
Payer: MEDICAID

## 2025-06-10 DIAGNOSIS — Z00.00 WELLNESS EXAMINATION: Primary | ICD-10-CM

## 2025-06-24 ENCOUNTER — TELEPHONE (OUTPATIENT)
Dept: FAMILY MEDICINE | Facility: CLINIC | Age: 55
End: 2025-06-24
Payer: MEDICAID

## 2025-06-24 NOTE — TELEPHONE ENCOUNTER
Copied from CRM #[id number]. Topic: General Inquiry - Patient Advice  >> Jun 24, 2025  8:09 AM Max wrote:  Who Called: Helena Vazquez    Caller is requesting assistance/information from provider's office.    Symptoms (please be specific): low sugar level   How long has patient had these symptoms:    List of preferred pharmacies on file (remove unneeded): [unfilled]  If different, enter pharmacy into here including location and phone number:       Preferred Method of Contact: Phone Call  Patient's Preferred Phone Number on File: [phone]   Best Call Back Number, if different:  Additional Information: Patient is requesting a call back states her sugar has been dropping states it has been at 60 the past couple of days. States when she eats it will go up to 170 then drop down to 60 again, would like a call back to discuss what should she do.

## 2025-06-25 ENCOUNTER — TELEPHONE (OUTPATIENT)
Dept: OBSTETRICS AND GYNECOLOGY | Facility: CLINIC | Age: 55
End: 2025-06-25
Payer: MEDICAID

## 2025-06-25 DIAGNOSIS — Z79.4 TYPE 2 DIABETES MELLITUS WITH HYPERGLYCEMIA, WITH LONG-TERM CURRENT USE OF INSULIN: ICD-10-CM

## 2025-06-25 DIAGNOSIS — E11.65 TYPE 2 DIABETES MELLITUS WITH HYPERGLYCEMIA, WITH LONG-TERM CURRENT USE OF INSULIN: ICD-10-CM

## 2025-06-25 RX ORDER — PEN NEEDLE, DIABETIC 32GX 5/32"
NEEDLE, DISPOSABLE MISCELLANEOUS
Qty: 100 EACH | Refills: 2 | Status: SHIPPED | OUTPATIENT
Start: 2025-06-25

## 2025-06-26 NOTE — TELEPHONE ENCOUNTER
Increase Amlodipine to 10 mg daily.     
Patient taking medication has prescribed for blood pressure  Abigail with [facility] called stating Ms. Malik's bp is running between 177/102 and 154/83. Today she is running 158/98  
Spoke with Abigail with home health and she will let patient know of medication change  
23

## 2025-07-06 DIAGNOSIS — E11.65 TYPE 2 DIABETES MELLITUS WITH HYPERGLYCEMIA, WITHOUT LONG-TERM CURRENT USE OF INSULIN: Chronic | ICD-10-CM

## 2025-07-07 RX ORDER — PIOGLITAZONE 30 MG/1
30 TABLET ORAL
Qty: 30 TABLET | Refills: 5 | Status: SHIPPED | OUTPATIENT
Start: 2025-07-07

## 2025-07-08 ENCOUNTER — LAB VISIT (OUTPATIENT)
Dept: LAB | Facility: HOSPITAL | Age: 55
End: 2025-07-08
Attending: FAMILY MEDICINE
Payer: MEDICAID

## 2025-07-08 DIAGNOSIS — Z00.00 WELLNESS EXAMINATION: ICD-10-CM

## 2025-07-08 LAB
ALBUMIN SERPL-MCNC: 3.4 G/DL (ref 3.5–5)
ALBUMIN/GLOB SERPL: 0.8 RATIO (ref 1.1–2)
ALP SERPL-CCNC: 113 UNIT/L (ref 40–150)
ALT SERPL-CCNC: 12 UNIT/L (ref 0–55)
ANION GAP SERPL CALC-SCNC: 8 MEQ/L
AST SERPL-CCNC: 11 UNIT/L (ref 11–45)
BASOPHILS # BLD AUTO: 0.01 X10(3)/MCL
BASOPHILS NFR BLD AUTO: 0.1 %
BILIRUB SERPL-MCNC: 0.5 MG/DL
BUN SERPL-MCNC: 18 MG/DL (ref 9.8–20.1)
CALCIUM SERPL-MCNC: 9.2 MG/DL (ref 8.4–10.2)
CHLORIDE SERPL-SCNC: 104 MMOL/L (ref 98–107)
CHOLEST SERPL-MCNC: 220 MG/DL
CHOLEST/HDLC SERPL: 5 {RATIO} (ref 0–5)
CO2 SERPL-SCNC: 29 MMOL/L (ref 22–29)
CREAT SERPL-MCNC: 0.81 MG/DL (ref 0.55–1.02)
CREAT/UREA NIT SERPL: 22
EOSINOPHIL # BLD AUTO: 0.4 X10(3)/MCL (ref 0–0.9)
EOSINOPHIL NFR BLD AUTO: 4.4 %
ERYTHROCYTE [DISTWIDTH] IN BLOOD BY AUTOMATED COUNT: 13.4 % (ref 11.5–17)
GFR SERPLBLD CREATININE-BSD FMLA CKD-EPI: >60 ML/MIN/1.73/M2
GLOBULIN SER-MCNC: 4.2 GM/DL (ref 2.4–3.5)
GLUCOSE SERPL-MCNC: 107 MG/DL (ref 74–100)
HCT VFR BLD AUTO: 44.9 % (ref 37–47)
HCV AB SERPL QL IA: NONREACTIVE
HDLC SERPL-MCNC: 41 MG/DL (ref 35–60)
HGB BLD-MCNC: 14.6 G/DL (ref 12–16)
HIV 1+2 AB+HIV1 P24 AG SERPL QL IA: NONREACTIVE
IMM GRANULOCYTES # BLD AUTO: 0.04 X10(3)/MCL (ref 0–0.04)
IMM GRANULOCYTES NFR BLD AUTO: 0.4 %
LDLC SERPL CALC-MCNC: 148 MG/DL (ref 50–140)
LYMPHOCYTES # BLD AUTO: 2.49 X10(3)/MCL (ref 0.6–4.6)
LYMPHOCYTES NFR BLD AUTO: 27.3 %
MCH RBC QN AUTO: 27.4 PG (ref 27–31)
MCHC RBC AUTO-ENTMCNC: 32.5 G/DL (ref 33–36)
MCV RBC AUTO: 84.4 FL (ref 80–94)
MONOCYTES # BLD AUTO: 0.38 X10(3)/MCL (ref 0.1–1.3)
MONOCYTES NFR BLD AUTO: 4.2 %
NEUTROPHILS # BLD AUTO: 5.81 X10(3)/MCL (ref 2.1–9.2)
NEUTROPHILS NFR BLD AUTO: 63.6 %
NRBC BLD AUTO-RTO: 0 %
PLATELET # BLD AUTO: 287 X10(3)/MCL (ref 130–400)
PMV BLD AUTO: 9.6 FL (ref 7.4–10.4)
POTASSIUM SERPL-SCNC: 3.9 MMOL/L (ref 3.5–5.1)
PROT SERPL-MCNC: 7.6 GM/DL (ref 6.4–8.3)
RBC # BLD AUTO: 5.32 X10(6)/MCL (ref 4.2–5.4)
SODIUM SERPL-SCNC: 141 MMOL/L (ref 136–145)
TRIGL SERPL-MCNC: 157 MG/DL (ref 37–140)
TSH SERPL-ACNC: 1.52 UIU/ML (ref 0.35–4.94)
VLDLC SERPL CALC-MCNC: 31 MG/DL
WBC # BLD AUTO: 9.13 X10(3)/MCL (ref 4.5–11.5)

## 2025-07-08 PROCEDURE — 80061 LIPID PANEL: CPT

## 2025-07-08 PROCEDURE — 84443 ASSAY THYROID STIM HORMONE: CPT

## 2025-07-08 PROCEDURE — 36415 COLL VENOUS BLD VENIPUNCTURE: CPT

## 2025-07-08 PROCEDURE — 80053 COMPREHEN METABOLIC PANEL: CPT

## 2025-07-08 PROCEDURE — 86803 HEPATITIS C AB TEST: CPT

## 2025-07-08 PROCEDURE — 87389 HIV-1 AG W/HIV-1&-2 AB AG IA: CPT

## 2025-07-08 PROCEDURE — 85025 COMPLETE CBC W/AUTO DIFF WBC: CPT

## 2025-07-10 ENCOUNTER — TELEPHONE (OUTPATIENT)
Dept: FAMILY MEDICINE | Facility: CLINIC | Age: 55
End: 2025-07-10
Payer: MEDICAID

## 2025-07-10 NOTE — TELEPHONE ENCOUNTER
Pt called to reschedule gyno apt pt was informed that we can not reschedule apt she would need to call Daria ayers

## 2025-07-10 NOTE — TELEPHONE ENCOUNTER
Copied from CRM #[id number]. Topic: Appointments - Appointment Rescheduling  >> Jul 10, 2025 10:28 AM Edita wrote:  Who Called: Helena Vazquez    Caller is requesting assistance/information from provider's office.    Symptoms (please be specific):    How long has patient had these symptoms:   List of preferred pharmacies on file (remove unneeded): [unfilled]  If different, enter pharmacy into here including location and phone number:       Preferred Method of Contact: Phone Call  Patient's Preferred Phone Number on File: [phone]   Best Call Back Number, if different:  Additional Information: Pt called to reschedule her appt today

## 2025-07-11 ENCOUNTER — OFFICE VISIT (OUTPATIENT)
Dept: FAMILY MEDICINE | Facility: CLINIC | Age: 55
End: 2025-07-11
Payer: MEDICAID

## 2025-07-11 ENCOUNTER — CLINICAL SUPPORT (OUTPATIENT)
Dept: FAMILY MEDICINE | Facility: CLINIC | Age: 55
End: 2025-07-11
Attending: FAMILY MEDICINE
Payer: MEDICAID

## 2025-07-11 VITALS
BODY MASS INDEX: 38.46 KG/M2 | OXYGEN SATURATION: 98 % | RESPIRATION RATE: 20 BRPM | TEMPERATURE: 97 F | DIASTOLIC BLOOD PRESSURE: 87 MMHG | SYSTOLIC BLOOD PRESSURE: 136 MMHG | HEIGHT: 62 IN | HEART RATE: 82 BPM | WEIGHT: 209 LBS

## 2025-07-11 DIAGNOSIS — E11.65 TYPE 2 DIABETES MELLITUS WITH HYPERGLYCEMIA, WITH LONG-TERM CURRENT USE OF INSULIN: ICD-10-CM

## 2025-07-11 DIAGNOSIS — Z79.4 TYPE 2 DIABETES MELLITUS WITH HYPERGLYCEMIA, WITH LONG-TERM CURRENT USE OF INSULIN: ICD-10-CM

## 2025-07-11 DIAGNOSIS — Z00.00 ROUTINE GENERAL MEDICAL EXAMINATION AT A HEALTH CARE FACILITY: Primary | ICD-10-CM

## 2025-07-11 DIAGNOSIS — E11.42 DIABETIC POLYNEUROPATHY ASSOCIATED WITH TYPE 2 DIABETES MELLITUS: ICD-10-CM

## 2025-07-11 RX ORDER — METOCLOPRAMIDE 10 MG/1
10 TABLET ORAL
COMMUNITY
Start: 2025-07-03

## 2025-07-11 RX ORDER — SEMAGLUTIDE 1.34 MG/ML
1 INJECTION, SOLUTION SUBCUTANEOUS
COMMUNITY
Start: 2025-06-24

## 2025-07-11 RX ORDER — GABAPENTIN 100 MG/1
100 CAPSULE ORAL 3 TIMES DAILY
Qty: 90 CAPSULE | Refills: 11 | Status: SHIPPED | OUTPATIENT
Start: 2025-07-11 | End: 2026-07-11

## 2025-07-11 NOTE — PROGRESS NOTES
Patient ID: [id number]     Chief Complaint: Annual Exam    HPI:     Helena Vazquez is a 55 y.o. female here today for an annual wellness visit. Reviewed and discussed lab results. Overall she feels well. Right hip pain for past 3 days.     History of Present Illness               -------------------------------------    Asthma    Bipolar disorder, unspecified    Diabetes mellitus type 2 in obese    DUB (dysfunctional uterine bleeding)    Edema leg    Family history of breast cancer in sister    x2    Hepatic steatosis    History of infection due to human papilloma virus (HPV)    HLD (hyperlipidemia)    HTN (hypertension)    Hypercholesterolemia    Hyperlipidemia due to type 2 diabetes mellitus    Hypertriglyceridemia    WALTER (iron deficiency anemia)    Microcytic anemia    Morbid obesity    Schizoaffective disorder    Type 2 diabetes mellitus    Type 2 diabetes mellitus with hyperglycemia    Urge incontinence of urine    Vision blurred        Past Surgical History:   Procedure Laterality Date     SECTION  1994     SECTION  1991     SECTION WITH TUBAL LIGATION  1998    CHOLECYSTECTOMY      ESOPHAGOGASTRODUODENOSCOPY N/A 2024    Procedure: EGD (ESOPHAGOGASTRODUODENOSCOPY);  Surgeon: Sandra De La Fuente MD;  Location: [facility];  Service: General;  Laterality: N/A;    HERNIA REPAIR      TUBAL LIGATION         Review of patient's allergies indicates:  No Known Allergies    Outpatient Medications Marked as Taking for the 25 encounter (Office Visit) with Fei Smith, DO   Medication Sig Dispense Refill    albuterol-ipratropium (DUO-NEB) 2.5 mg-0.5 mg/3 mL nebulizer solution Take 3 mLs by nebulization every 6 (six) hours as needed for Wheezing. Rescue 75 mL 5    alcohol antiseptic pads (ALCOHOL SWABS TOP)   alcohol swabs, See Instructions, use alcohol swab on injection site prior to injection, # 1 box(es), 3 Refill(s), Pharmacy: [facility],  "155, cm, Height/Length Dosing, 02/09/22 9:59:00 CST, 116.4, kg, Weight Dosing, 02/09/22 9:59...      amLODIPine (NORVASC) 10 MG tablet Take 1 tablet (10 mg total) by mouth once daily. 90 tablet 0    atorvastatin (LIPITOR) 80 MG tablet Take 1 tablet (80 mg total) by mouth once daily. 90 tablet 3    DEXCOM G7 SENSOR Megan USE ONE EVERY 10 DAYS 3 each 2    empagliflozin (JARDIANCE) 25 mg tablet Take 1 tablet (25 mg total) by mouth every morning. 30 tablet 5    famotidine (PEPCID) 20 MG tablet Take 20 mg by mouth 2 (two) times daily.      fenofibrate (TRICOR) 54 MG tablet Take 1 tablet (54 mg total) by mouth once daily. 90 tablet 3    glipiZIDE (GLUCOTROL) 10 MG tablet TAKE 1 TABLET BY MOUTH 2 TIMES A DAY 60 tablet 5    hydroCHLOROthiazide (HYDRODIURIL) 25 MG tablet TAKE ONE TABLET BY MOUTH ONCE DAILY 30 tablet 2    insulin glargine U-100, Lantus, (LANTUS SOLOSTAR U-100 INSULIN) 100 unit/mL (3 mL) InPn pen Inject 26 Units into the skin once daily. If in the 100s and  28 units if its in the 200s 23.4 mL 3    metoclopramide HCl (REGLAN) 10 MG tablet Take 10 mg by mouth.      ondansetron (ZOFRAN-ODT) 8 MG TbDL Take 1 tablet (8 mg total) by mouth every 8 (eight) hours as needed (nausea/ vomiting). 30 tablet 0    oxybutynin (DITROPAN) 5 MG Tab TAKE ONE TABLET BY MOUTH THREE TIMES A DAY 90 tablet 3    OZEMPIC 1 mg/dose (4 mg/3 mL) Inject 1 mg into the skin every 7 days.      pantoprazole (PROTONIX) 40 MG tablet Take 1 tablet (40 mg total) by mouth 2 (two) times daily. For acid reflux 180 tablet 0    pen needle, diabetic (SURE COMFORT PEN NEEDLE) 32 gauge x 1/4" Ndle AS DIRECTED ONCE DAILY FOR LANTUS SOLOSTAR 100 each 2    pioglitazone (ACTOS) 30 MG tablet TAKE ONE TABLET BY MOUTH EVERY DAY 30 tablet 5    valsartan (DIOVAN) 160 MG tablet Take 1 tablet (160 mg total) by mouth once daily. 90 tablet 3    valsartan (DIOVAN) 40 MG tablet Take 1 tablet (40 mg total) by mouth once daily. Take with 160mg tablet. 90 tablet 3    " "venlafaxine (EFFEXOR-XR) 37.5 MG 24 hr capsule Take 1 capsule (37.5 mg total) by mouth once daily. 30 capsule 11    zolpidem (AMBIEN) 5 MG Tab Take 5 mg by mouth nightly as needed.         Social History[1]     Family History   Problem Relation Name Age of Onset    Heart attack Mother Shauna stephanie     Diabetes Mellitus Mother Shauna stephanie     Coronary artery disease Mother Shauna stephanie     Hypertension Mother Shauna stephanie     Asthma Mother Shauna stephanie     Depression Mother Shauna stephanie     Learning disabilities Mother Shauna stephanie     Lung cancer Father Jayson champagne     Diabetes Mellitus Father Jayson champagne     Diabetes Father Jayson champagne     Breast cancer Sister      Diabetes Sister      Breast cancer Sister          Patient Care Team:  Fei Smith DO as PCP - General (Family Medicine)  [facility] as Consulting Physician (Optometry)  Sandra De La Fuente MD as Consulting Physician (General Surgery)  Earlene Stubbs RN as Diabetes Educator (Diabetes)       Subjective:     ROS    See HPI for details  All Other ROS: Negative except as stated in HPI.       Objective:     /87 (BP Location: Left arm, Patient Position: Sitting)   Pulse 82   Temp 97.1 °F (36.2 °C)   Resp 20   Ht 5' 2" (1.575 m)   Wt 94.8 kg (209 lb)   SpO2 98%   BMI 38.23 kg/m²     Physical Exam  Vitals reviewed.   Constitutional:       General: She is not in acute distress.     Appearance: Normal appearance. She is obese.   Cardiovascular:      Rate and Rhythm: Normal rate and regular rhythm.      Pulses:           Dorsalis pedis pulses are 2+ on the right side and 2+ on the left side.        Posterior tibial pulses are 2+ on the right side and 2+ on the left side.      Heart sounds: No murmur heard.     No friction rub. No gallop.   Pulmonary:      Effort: No respiratory distress.      Breath sounds: No wheezing, rhonchi or rales.   Musculoskeletal:         General: No swelling, tenderness or deformity.      " Right lower leg: No edema.      Left lower leg: No edema.      Right foot: Normal range of motion. No deformity, bunion, Charcot foot, foot drop or prominent metatarsal heads.      Left foot: Normal range of motion. No deformity, bunion, Charcot foot, foot drop or prominent metatarsal heads.   Feet:      Right foot:      Protective Sensation: 10 sites tested.  4 sites sensed.      Skin integrity: Skin integrity normal.      Toenail Condition: Right toenails are normal.      Left foot:      Protective Sensation: 10 sites tested.  4 sites sensed.      Skin integrity: Skin integrity normal.      Toenail Condition: Left toenails are normal.      Comments: Sensation of 1st and 2nd toes at both sites bilaterally. No sensation of 3rd-5th toes.   Skin:     General: Skin is warm and dry.      Findings: No lesion or rash.   Neurological:      General: No focal deficit present.      Mental Status: She is alert.   Psychiatric:         Mood and Affect: Mood normal.         Assessment:       ICD-10-CM ICD-9-CM   1. Routine general medical examination at a health care facility  Z00.00 V70.0   2. Type 2 diabetes mellitus with hyperglycemia, with long-term current use of insulin  E11.65 250.00    Z79.4 790.29     V58.67   3. Diabetic polyneuropathy associated with type 2 diabetes mellitus  E11.42 250.60     357.2        Plan:     Assessment & Plan               Health Maintenance Topics with due status: Not Due       Topic Last Completion Date    TETANUS VACCINE 11/18/2019    Cervical Cancer Screening 04/17/2024    Influenza Vaccine 10/03/2024    Mammogram 03/28/2025    Lipid Panel 07/08/2025    Low Dose Statin 07/11/2025    Foot Exam 07/11/2025    RSV Vaccine (Age 60+ and Pregnant patients) Not Due      Vaccinations -   Immunization History   Administered Date(s) Administered    Influenza - Quadrivalent 09/15/2020, 10/07/2021    Influenza - Trivalent - Fluarix, Flulaval, Fluzone, Afluria - PF 11/04/2019, 10/03/2024    Tdap  11/18/2019        1. Routine general medical examination at a health care facility    2. Type 2 diabetes mellitus with hyperglycemia, with long-term current use of insulin  - gabapentin (NEURONTIN) 100 MG capsule; Take 1 capsule (100 mg total) by mouth 3 (three) times daily.  Dispense: 90 capsule; Refill: 11    3. Diabetic polyneuropathy associated with type 2 diabetes mellitus  - gabapentin (NEURONTIN) 100 MG capsule; Take 1 capsule (100 mg total) by mouth 3 (three) times daily.  Dispense: 90 capsule; Refill: 11      Medication List with Changes/Refills   New Medications    GABAPENTIN (NEURONTIN) 100 MG CAPSULE    Take 1 capsule (100 mg total) by mouth 3 (three) times daily.       Start Date: 7/11/2025 End Date: 7/11/2026   Current Medications    ALBUTEROL-IPRATROPIUM (DUO-NEB) 2.5 MG-0.5 MG/3 ML NEBULIZER SOLUTION    Take 3 mLs by nebulization every 6 (six) hours as needed for Wheezing. Rescue       Start Date: 9/3/2024  End Date: 7/11/2025    ALCOHOL ANTISEPTIC PADS (ALCOHOL SWABS TOP)      alcohol swabs, See Instructions, use alcohol swab on injection site prior to injection, # 1 box(es), 3 Refill(s), Pharmacy: [facility], 155, cm, Height/Length Dosing, 02/09/22 9:59:00 CST, 116.4, kg, Weight Dosing, 02/09/22 9:59...       Start Date: 2/9/2022  End Date: --    AMLODIPINE (NORVASC) 10 MG TABLET    Take 1 tablet (10 mg total) by mouth once daily.       Start Date: 12/23/2024End Date: 7/11/2025    ATORVASTATIN (LIPITOR) 80 MG TABLET    Take 1 tablet (80 mg total) by mouth once daily.       Start Date: 4/23/2025 End Date: --    BLOOD SUGAR DIAGNOSTIC STRP    1 each by Misc.(Non-Drug; Combo Route) route every 10 days.       Start Date: 10/8/2024 End Date: --    DEXCOM G7 SENSOR KARENA    USE ONE EVERY 10 DAYS       Start Date: 4/14/2025 End Date: --    EMPAGLIFLOZIN (JARDIANCE) 25 MG TABLET    Take 1 tablet (25 mg total) by mouth every morning.       Start Date: 3/12/2025 End Date: --     "FAMOTIDINE (PEPCID) 20 MG TABLET    Take 20 mg by mouth 2 (two) times daily.       Start Date: --        End Date: --    FENOFIBRATE (TRICOR) 54 MG TABLET    Take 1 tablet (54 mg total) by mouth once daily.       Start Date: 12/21/2023End Date: 7/11/2025    GLIPIZIDE (GLUCOTROL) 10 MG TABLET    TAKE 1 TABLET BY MOUTH 2 TIMES A DAY       Start Date: 3/31/2025 End Date: --    HYDROCHLOROTHIAZIDE (HYDRODIURIL) 25 MG TABLET    TAKE ONE TABLET BY MOUTH ONCE DAILY       Start Date: 3/18/2025 End Date: --    INSULIN GLARGINE U-100, LANTUS, (LANTUS SOLOSTAR U-100 INSULIN) 100 UNIT/ML (3 ML) INPN PEN    Inject 26 Units into the skin once daily. If in the 100s and  28 units if its in the 200s       Start Date: 5/5/2025  End Date: 5/5/2026    LANCETS (ONETOUCH DELICA PLUS LANCET) 33 GAUGE MISC    Inject 1 lancet  into the skin 4 (four) times daily.       Start Date: 10/23/2024End Date: --    METOCLOPRAMIDE HCL (REGLAN) 10 MG TABLET    Take 10 mg by mouth.       Start Date: 7/3/2025  End Date: --    ONDANSETRON (ZOFRAN-ODT) 8 MG TBDL    Take 1 tablet (8 mg total) by mouth every 8 (eight) hours as needed (nausea/ vomiting).       Start Date: 1/8/2025  End Date: --    OXYBUTYNIN (DITROPAN) 5 MG TAB    TAKE ONE TABLET BY MOUTH THREE TIMES A DAY       Start Date: 3/31/2025 End Date: --    OZEMPIC 1 MG/DOSE (4 MG/3 ML)    Inject 1 mg into the skin every 7 days.       Start Date: 6/24/2025 End Date: --    PANTOPRAZOLE (PROTONIX) 40 MG TABLET    Take 1 tablet (40 mg total) by mouth 2 (two) times daily. For acid reflux       Start Date: 11/14/2024End Date: 7/11/2025    PEN NEEDLE, DIABETIC (SURE COMFORT PEN NEEDLE) 32 GAUGE X 1/4" NDLE    AS DIRECTED ONCE DAILY FOR LANTUS SOLOSTAR       Start Date: 6/25/2025 End Date: --    PEN NEEDLE, DIABETIC 31 GAUGE X 3/16" NDLE    1 Needle by Misc.(Non-Drug; Combo Route) route once daily.       Start Date: 10/23/2024End Date: --    PIOGLITAZONE (ACTOS) 30 MG TABLET    TAKE ONE TABLET BY MOUTH " EVERY DAY       Start Date: 7/7/2025  End Date: --    VALSARTAN (DIOVAN) 160 MG TABLET    Take 1 tablet (160 mg total) by mouth once daily.       Start Date: 5/5/2025  End Date: 5/5/2026    VALSARTAN (DIOVAN) 40 MG TABLET    Take 1 tablet (40 mg total) by mouth once daily. Take with 160mg tablet.       Start Date: 5/5/2025  End Date: 5/5/2026    VENLAFAXINE (EFFEXOR-XR) 37.5 MG 24 HR CAPSULE    Take 1 capsule (37.5 mg total) by mouth once daily.       Start Date: 1/6/2025  End Date: 1/6/2026    ZOLPIDEM (AMBIEN) 5 MG TAB    Take 5 mg by mouth nightly as needed.       Start Date: --        End Date: --        The patient's Health Maintenance was reviewed and the following appears to be due at this time:   Health Maintenance Due   Topic Date Due    Pneumococcal Vaccines (Age 50+) (1 of 2 - PCV) Never done    Shingles Vaccine (1 of 2) Never done    Colorectal Cancer Screening  02/28/2023    COVID-19 Vaccine (1 - 2024-25 season) Never done    Diabetic Eye Exam  12/27/2024    Diabetes Urine Screening  03/19/2025    Hemoglobin A1c  07/06/2025     This note was generated with the assistance of ambient listening technology. Verbal consent was obtained by the patient and accompanying visitor(s) for the recording of patient appointment to facilitate this note. I attest to having reviewed and edited the generated note for accuracy, though some syntax or spelling errors may persist. Please contact the author of this note for any clarification.       Follow up in about 4 weeks (around 8/8/2025) for Follow up neuropathy . In addition to their scheduled follow up, the patient has also been instructed to follow up on as needed basis.          [1]   Social History  Socioeconomic History    Marital status:    Tobacco Use    Smoking status: Former    Smokeless tobacco: Never   Vaping Use    Vaping status: Never Used   Substance and Sexual Activity    Alcohol use: Never    Drug use: Never    Sexual activity: Yes     Partners:  Male     Birth control/protection: None     Social Drivers of Health     Financial Resource Strain: Low Risk  (7/9/2025)    Overall Financial Resource Strain (CARDIA)     Difficulty of Paying Living Expenses: Not hard at all   Food Insecurity: Food Insecurity Present (7/9/2025)    Hunger Vital Sign     Worried About Running Out of Food in the Last Year: Patient declined     Ran Out of Food in the Last Year: Sometimes true   Transportation Needs: No Transportation Needs (7/9/2025)    PRAPARE - Transportation     Lack of Transportation (Medical): No     Lack of Transportation (Non-Medical): No   Physical Activity: Inactive (7/9/2025)    Exercise Vital Sign     Days of Exercise per Week: 1 day     Minutes of Exercise per Session: 0 min   Stress: Stress Concern Present (7/9/2025)    [employer] [place] [employer] - Occupational Stress Questionnaire     Feeling of Stress : Very much   Housing Stability: Low Risk  (7/9/2025)    Housing Stability Vital Sign     Unable to Pay for Housing in the Last Year: No     Number of Times Moved in the Last Year: 0     Homeless in the Last Year: No

## 2025-07-11 NOTE — PROGRESS NOTES
Helena Vazquez is a 55 y.o. female here for a diabetic eye screening with non-dilated fundus photos per Dr. Orr.    Patient cooperative?: Yes  Small pupils?: Yes  Last eye exam: unknown    For exam results, see Encounter Report.

## 2025-07-14 DIAGNOSIS — E11.65 TYPE 2 DIABETES MELLITUS WITH HYPERGLYCEMIA, WITHOUT LONG-TERM CURRENT USE OF INSULIN: ICD-10-CM

## 2025-07-14 DIAGNOSIS — E11.65 TYPE 2 DIABETES MELLITUS WITH HYPERGLYCEMIA: ICD-10-CM

## 2025-07-14 RX ORDER — BLOOD-GLUCOSE SENSOR
EACH MISCELLANEOUS
Qty: 3 EACH | Refills: 2 | Status: SHIPPED | OUTPATIENT
Start: 2025-07-14

## 2025-07-14 RX ORDER — SEMAGLUTIDE 1.34 MG/ML
INJECTION, SOLUTION SUBCUTANEOUS
Qty: 3 ML | Refills: 2 | Status: SHIPPED | OUTPATIENT
Start: 2025-07-14

## 2025-07-16 ENCOUNTER — TELEPHONE (OUTPATIENT)
Dept: FAMILY MEDICINE | Facility: CLINIC | Age: 55
End: 2025-07-16
Payer: MEDICAID

## 2025-07-16 NOTE — TELEPHONE ENCOUNTER
Copied from CRM #[id number]. Topic: General Inquiry - Patient Advice  >> Jul 16, 2025  9:27 AM Katarzyna wrote:  .Type:  Patient Returning Call    Who Called:pt  Who Left Message for Patient:pt  Does the patient know what this is regarding?:call back  Would the patient rather a call back or a response via [facility]? KIERRA  Best Call Back Number:[phone]   Additional Information: Please call back no additional information was given

## 2025-07-20 DIAGNOSIS — N39.41 URGE INCONTINENCE OF URINE: Chronic | ICD-10-CM

## 2025-07-21 RX ORDER — OXYBUTYNIN CHLORIDE 5 MG/1
5 TABLET ORAL 3 TIMES DAILY
Qty: 90 TABLET | Refills: 3 | Status: SHIPPED | OUTPATIENT
Start: 2025-07-21

## 2025-07-24 ENCOUNTER — OFFICE VISIT (OUTPATIENT)
Dept: OBSTETRICS AND GYNECOLOGY | Facility: CLINIC | Age: 55
End: 2025-07-24
Payer: MEDICAID

## 2025-07-24 ENCOUNTER — TELEPHONE (OUTPATIENT)
Dept: FAMILY MEDICINE | Facility: CLINIC | Age: 55
End: 2025-07-24
Payer: MEDICAID

## 2025-07-24 VITALS
HEIGHT: 62 IN | BODY MASS INDEX: 38.46 KG/M2 | WEIGHT: 209 LBS | DIASTOLIC BLOOD PRESSURE: 94 MMHG | SYSTOLIC BLOOD PRESSURE: 144 MMHG

## 2025-07-24 DIAGNOSIS — N89.8 VAGINAL DISCHARGE: ICD-10-CM

## 2025-07-24 DIAGNOSIS — Z12.4 CERVICAL CANCER SCREENING: ICD-10-CM

## 2025-07-24 DIAGNOSIS — Z01.419 WELL WOMAN EXAM WITH ROUTINE GYNECOLOGICAL EXAM: Primary | ICD-10-CM

## 2025-07-24 DIAGNOSIS — N95.0 POSTMENOPAUSAL BLEEDING: ICD-10-CM

## 2025-07-24 PROCEDURE — 3077F SYST BP >= 140 MM HG: CPT | Mod: CPTII,,,

## 2025-07-24 PROCEDURE — 1159F MED LIST DOCD IN RCRD: CPT | Mod: CPTII,,,

## 2025-07-24 PROCEDURE — 3008F BODY MASS INDEX DOCD: CPT | Mod: CPTII,,,

## 2025-07-24 PROCEDURE — 1160F RVW MEDS BY RX/DR IN RCRD: CPT | Mod: CPTII,,,

## 2025-07-24 PROCEDURE — 99386 PREV VISIT NEW AGE 40-64: CPT | Mod: S$PBB,,,

## 2025-07-24 PROCEDURE — 4010F ACE/ARB THERAPY RXD/TAKEN: CPT | Mod: CPTII,,,

## 2025-07-24 PROCEDURE — 99999 PR PBB SHADOW E&M-EST. PATIENT-LVL V: CPT | Mod: PBBFAC,,,

## 2025-07-24 PROCEDURE — 3044F HG A1C LEVEL LT 7.0%: CPT | Mod: CPTII,,,

## 2025-07-24 PROCEDURE — 99215 OFFICE O/P EST HI 40 MIN: CPT | Mod: PBBFAC

## 2025-07-24 PROCEDURE — 3080F DIAST BP >= 90 MM HG: CPT | Mod: CPTII,,,

## 2025-07-24 NOTE — TELEPHONE ENCOUNTER
Copied from CRM #[id number]. Topic: General Inquiry - Patient Advice  >> Jul 24, 2025 12:05 PM Ramon wrote:  .Who Called: Helena Vazquez    Caller is requesting assistance/information from provider's office.    Symptoms (please be specific): N/A   How long has patient had these symptoms:  N/A  List of preferred pharmacies on file (remove unneeded): [unfilled]  If different, enter pharmacy into here including location and phone number: N/A    Preferred Method of Contact: Phone Call    Patient's Preferred Phone Number on File: [phone]     Best Call Back Number, if different:    Additional Information: Pt requesting a cb from CLIFTON Finch to discuss he ob/gyn appointment she had today. Please advise, thank you.   decreased flexibility/decreased strength

## 2025-07-24 NOTE — TELEPHONE ENCOUNTER
Copied from CRM #[id number]. Topic: General Inquiry - Test Results  >> Jul 24, 2025  2:01 PM Lesley wrote:  .Who Called: Helena Vazquez    Caller is requesting assistance/information from provider's office.    Symptoms (please be specific): discuss labs and other test that was recently done    How long has patient had these symptoms:  n/a   List of preferred pharmacies on file (remove unneeded): [unfilled]  If different, enter pharmacy into here including location and phone number: n/a       Preferred Method of Contact: Phone Call  Patient's Preferred Phone Number on File: [phone]   Best Call Back Number, if different:  Additional Information:

## 2025-07-26 NOTE — PROGRESS NOTES
Chief Complaint:   Well Woman (Pt is here for annual GYN exam. Pt states she hasn't had a cycle in over a year and recently has started bleeding again, States its been going on for two months, lasting about a week each time.)     History of Present Illness:  Helena Vazquez is a 55 y.o. year old  presents for her well woman exam. Currently relying on menopause for birth control. No LMP recorded. Patient is postmenopausal.    Pt states she hasn't had a cycle in almost 2 years and the last 2 months she has had 3-4 episodes of heavy bleeding that last about a week at a time. Denies any other symptoms.     Cancer-related family history includes Breast cancer in her sister and sister; Lung cancer in her father. There is no history of Uterine cancer, Cervical cancer, or Ovarian cancer.          Gyn History:  Menstrual History  Cycle: No  Menarche Age: 1 years  No Cycle Reason: Other  Other Reason: menopauseal    Menopause  Menopause Age: 53 years  Post Menopausal Bleeding: Yes  Hormone Replacement Therapy: No    Pap History  Last pap date: 24  Result: (!) Abnormal (NIL -GC -CZ -TV -HSV 1&2 , +HR HPV)  History of Abnormal Pap: (!) Yes  HPV Vaccine Completed: No (0/3)    [place]  Sexually Active: No  STI History: Yes (HPV)  Contraception: No    Breast History  Last Breast Imaging Date: Yes  Date: 25 (BI-RAD 2-Benign)  History of Abnormal Breast Imaging : No  History of Breast Biopsy: No        Review of Systems:  General/Constitutional: Chills denies. Fatigue/weakness denies. Fever denies. Night sweats denies. Hot flashes denies    Respiratory: Cough denies. Hemoptysis denies. SOB denies. Sputum production denies. Wheezing denies .   Cardiovascular: Chest pain denies. Dizziness denies. Palpitations denies. Swelling in hands/feet denies.                Breast: Dimpling denies. Breast mass denies. Breast pain/tenderness denies. Nipple discharge denies. Puckering denies.  Gastrointestinal: Abdominal pain  denies. Blood in stool denies. Constipation denies. Diarrhea denies. Heartburn denies. Nausea denies. Vomiting denies    Genitourinary: Incontinence denies. Blood in urine denies. Frequent urination denies. Painful urination denies. Urinary urgency denies. Nocturia denies    Gynecologic: Irregular menses denies. Heavy bleeding admits. Painful menses denies. Vaginal discharge denies. Vaginal odor denies. Vaginal itching denies. Vaginal lesion denies. Pelvic pain denies. Decreased libido denies. Vulvar lesion denies. Prolapse of genital organs denies. Painful intercourse denies. Postcoital bleeding denies    Psychiatric: Depression denies. Anxiety denies.     OB History    Para Term  AB Living   3 3 3 0 0 3   SAB IAB Ectopic Multiple Live Births   0 0 0 0 3      # 1 - Date: 90, Sex: Female, Weight: 2.296 kg (5 lb 1 oz), GA: None, Type: , Low Transverse, Apgar1: None, Apgar5: None, Living: Living, Birth Comments: None    # 2 - Date: 94, Sex: Female, Weight: 2.268 kg (5 lb), GA: None, Type: , Low Transverse, Apgar1: None, Apgar5: None, Living: Living, Birth Comments: None    # 3 - Date: 98, Sex: Male, Weight: 3.204 kg (7 lb 1 oz), GA: None, Type: , Low Transverse, Apgar1: None, Apgar5: None, Living: Living, Birth Comments: None      Past Medical History:   Diagnosis Date    Asthma     Bipolar disorder, unspecified     Diabetes mellitus type 2 in obese     DUB (dysfunctional uterine bleeding)     Edema leg     Family history of breast cancer in sister     x2    Hepatic steatosis     History of infection due to human papilloma virus (HPV)     HLD (hyperlipidemia)     HTN (hypertension)     Hypercholesterolemia     Hyperlipidemia due to type 2 diabetes mellitus 2022    Hypertriglyceridemia     WALTER (iron deficiency anemia)     Microcytic anemia     Morbid obesity     Schizoaffective disorder     Type 2 diabetes mellitus     Type 2 diabetes mellitus with  "hyperglycemia     Urge incontinence of urine     Vision blurred      Current Medications[1]    Review of patient's allergies indicates:  No Known Allergies    Past Surgical History:   Procedure Laterality Date     SECTION  1994     SECTION  1991     SECTION WITH TUBAL LIGATION  1998    CHOLECYSTECTOMY      ESOPHAGOGASTRODUODENOSCOPY N/A 2024    Procedure: EGD (ESOPHAGOGASTRODUODENOSCOPY);  Surgeon: Sandra De La Fuente MD;  [facility];  Service: General;  Laterality: N/A;    HERNIA REPAIR      TUBAL LIGATION       Family History   Problem Relation Name Age of Onset    Lung cancer Father Jayson champagne     Diabetes Mellitus Father Jayson champagne     Diabetes Father Jayson champagne     Heart attack Mother Shauna stephanie     Diabetes Mellitus Mother Shauna stephanie     Coronary artery disease Mother Shauna stephanie     Hypertension Mother Shauna stephanie     Asthma Mother Shauna stephanie     Depression Mother Shauna stephanie     Learning disabilities Mother Shauna stephanie     Breast cancer Sister      Diabetes Sister      Breast cancer Sister      Colon cancer Cousin      Uterine cancer Neg Hx      Cervical cancer Neg Hx      Ovarian cancer Neg Hx       Social History[2]    Physical Exam:  BP (!) 144/94 (BP Location: Left arm, Patient Position: Sitting)   Ht 5' 2" (1.575 m)   Wt 94.8 kg (209 lb)   BMI 38.23 kg/m²     Chaperone: present.       General appearance: healthy, well-nourished and well-developed     Psychiatric: Orientation to time, place and person. Normal mood and affect and active, alert     Skin: Appearance: no rashes or lesions.     Neck:   Neck: supple, FROM, trachea midline. and no masses   Thyroid: no enlargement or nodules and non-tender.       Cardiovascular:   Auscultation: RRR and no murmur.   Peripheral Vascular: no varicosities, LLE edema, RLE edema, calf tenderness, and palpable cord and pedal pulses intact.     Lungs:   Respiratory effort: " no intercostal retractions or accessory muscle usage.   Auscultation: no wheezing, rales/crackles, or rhonchi and clear to auscultation.     Breast:   Inspection/Palpation: no tenderness, discrete/distinct masses, skin changes, or abnormal secretions. Nipple appearance normal.     Abdomen:   Auscultation/Inspection/Palpation: no hepatomegaly, splenomegaly, masses, tenderness or CVA tenderness and soft, non-distended bowel sounds preset.    Hernia: no palpable hernias.     Female Genitalia:    Vulva: no masses, tenderness or lesions    Bladder/Urethra: no urethral discharge or mass, normal meatus, bladder non-distended.    Vagina: no tenderness, erythema, cystocele, rectocele, abnormal vaginal discharge or vesicle(s) or ulcers    Cervix: no discharge, no cervical lacerations noted or motion tenderness and grossly normal    Uterus: normal size and shape and midline, non-tender, and no uterine prolapse.    Adnexa/Parametria: no parametrial tenderness or mass, no adnexal tenderness or ovarian mass.     Lymph Nodes:   Palpation: non tender submandibular nodes, axillary nodes, or inguinal nodes.     Rectal Exam:   Rectum: normal perianal skin.       Assessment/Plan:  1. Well woman exam with routine gynecological exam    2. Postmenopausal bleeding  -     MDL Sendout Test  -     US Pelvis Comp with Transvag NON-OB (xpd; Future; Expected date: 08/01/2025    3. Vaginal discharge  -     MDL Sendout Test    4. Cervical cancer screening  -     Liquid-Based Pap Smear, Screening         Pap with leuk  Recommend BSE monthly  Discussed recommendations of annual screening after age of 40 with mammogram    Explained that screening is not 100% reliable. Advised patient if she notices any changes to her breast including a lump, mass, dimpling, discharge, rash, or tenderness she should contact us immediately.     Healthy diet, exercise   Multivitamin   Seat belt   Sunscreen use   Safe sex/ STI education   Contraception evaluation:  menopause  Gardasil evaluation: 0/3    One swab for myco and urea  Pelvic US    Discussed the various types of STDs, related symptoms and the potential consequences (including effects on fertility) of STD infections.       Reviewed ways to limit exposure and prevention techniques.       Cultures: gc/cz/tv    Labs: declined    Counseling:     A brief discussion of STD prevention was had.    Avoidance of cigarette smoking, alcohol use, and drug use was encouraged.    A healthy diet and regular exercise was stressed.    All questions were answered and the patient voiced understanding of the above issues      RTC in 2 weeks for results review and discussion of PMB with Dr. Cisneros in [place].                               [1]   Current Outpatient Medications:     albuterol-ipratropium (DUO-NEB) 2.5 mg-0.5 mg/3 mL nebulizer solution, Take 3 mLs by nebulization every 6 (six) hours as needed for Wheezing. Rescue, Disp: 75 mL, Rfl: 5    alcohol antiseptic pads (ALCOHOL SWABS TOP),  alcohol swabs, See Instructions, use alcohol swab on injection site prior to injection, # 1 box(es), 3 Refill(s), Pharmacy: [facility], 155, cm, Height/Length Dosing, 02/09/22 9:59:00 CST, 116.4, kg, Weight Dosing, 02/09/22 9:59..., Disp: , Rfl:     amLODIPine (NORVASC) 10 MG tablet, Take 1 tablet (10 mg total) by mouth once daily., Disp: 90 tablet, Rfl: 0    atorvastatin (LIPITOR) 80 MG tablet, Take 1 tablet (80 mg total) by mouth once daily., Disp: 90 tablet, Rfl: 3    blood sugar diagnostic Strp, 1 each by Misc.(Non-Drug; Combo Route) route every 10 days., Disp: 3 each, Rfl: 3    DEXCOM G7 SENSOR Megan, USE ONE EVERY 10 DAYS, Disp: 3 each, Rfl: 2    empagliflozin (JARDIANCE) 25 mg tablet, Take 1 tablet (25 mg total) by mouth every morning., Disp: 30 tablet, Rfl: 5    famotidine (PEPCID) 20 MG tablet, Take 20 mg by mouth 2 (two) times daily., Disp: , Rfl:     fenofibrate (TRICOR) 54 MG tablet, Take 1 tablet (54 mg total)  "by mouth once daily., Disp: 90 tablet, Rfl: 3    gabapentin (NEURONTIN) 100 MG capsule, Take 1 capsule (100 mg total) by mouth 3 (three) times daily., Disp: 90 capsule, Rfl: 11    glipiZIDE (GLUCOTROL) 10 MG tablet, TAKE 1 TABLET BY MOUTH 2 TIMES A DAY, Disp: 60 tablet, Rfl: 5    hydroCHLOROthiazide (HYDRODIURIL) 25 MG tablet, TAKE ONE TABLET BY MOUTH ONCE DAILY, Disp: 30 tablet, Rfl: 2    insulin glargine U-100, Lantus, (LANTUS SOLOSTAR U-100 INSULIN) 100 unit/mL (3 mL) InPn pen, Inject 26 Units into the skin once daily. If in the 100s and  28 units if its in the 200s, Disp: 23.4 mL, Rfl: 3    lancets (ONETOUCH DELICA PLUS LANCET) 33 gauge Misc, Inject 1 lancet  into the skin 4 (four) times daily., Disp: 120 each, Rfl: 3    metoclopramide HCl (REGLAN) 10 MG tablet, Take 10 mg by mouth., Disp: , Rfl:     ondansetron (ZOFRAN-ODT) 8 MG TbDL, Take 1 tablet (8 mg total) by mouth every 8 (eight) hours as needed (nausea/ vomiting)., Disp: 30 tablet, Rfl: 0    oxybutynin (DITROPAN) 5 MG Tab, TAKE ONE TABLET BY MOUTH THREE TIMES DAILY, Disp: 90 tablet, Rfl: 3    OZEMPIC 1 mg/dose (4 mg/3 mL), INJECT 1MG SUBCUTANEOUSLY EVERY 7 DAYS, Disp: 3 mL, Rfl: 2    pantoprazole (PROTONIX) 40 MG tablet, Take 1 tablet (40 mg total) by mouth 2 (two) times daily. For acid reflux, Disp: 180 tablet, Rfl: 0    pen needle, diabetic (SURE COMFORT PEN NEEDLE) 32 gauge x 1/4" Ndle, AS DIRECTED ONCE DAILY FOR LANTUS SOLOSTAR, Disp: 100 each, Rfl: 2    pen needle, diabetic 31 gauge x 3/16" Ndle, 1 Needle by Misc.(Non-Drug; Combo Route) route once daily., Disp: 50 each, Rfl: 2    pioglitazone (ACTOS) 30 MG tablet, TAKE ONE TABLET BY MOUTH EVERY DAY, Disp: 30 tablet, Rfl: 5    valsartan (DIOVAN) 160 MG tablet, Take 1 tablet (160 mg total) by mouth once daily., Disp: 90 tablet, Rfl: 3    valsartan (DIOVAN) 40 MG tablet, Take 1 tablet (40 mg total) by mouth once daily. Take with 160mg tablet., Disp: 90 tablet, Rfl: 3    venlafaxine (EFFEXOR-XR) 37.5 MG " 24 hr capsule, Take 1 capsule (37.5 mg total) by mouth once daily., Disp: 30 capsule, Rfl: 11    zolpidem (AMBIEN) 5 MG Tab, Take 5 mg by mouth nightly as needed., Disp: , Rfl:   [2]   Social History  Socioeconomic History    Marital status:    Tobacco Use    Smoking status: Former    Smokeless tobacco: Never   Vaping Use    Vaping status: Never Used   Substance and Sexual Activity    Alcohol use: Never    Drug use: Never    Sexual activity: Not Currently     Partners: Male     Birth control/protection: None     Social Drivers of Health     Financial Resource Strain: Low Risk  (7/9/2025)    Overall Financial Resource Strain (CARDIA)     Difficulty of Paying Living Expenses: Not hard at all   Food Insecurity: Food Insecurity Present (7/9/2025)    Hunger Vital Sign     Worried About Running Out of Food in the Last Year: Patient declined     Ran Out of Food in the Last Year: Sometimes true   Transportation Needs: No Transportation Needs (7/9/2025)    PRAPARE - Transportation     Lack of Transportation (Medical): No     Lack of Transportation (Non-Medical): No   Physical Activity: Inactive (7/9/2025)    Exercise Vital Sign     Days of Exercise per Week: 1 day     Minutes of Exercise per Session: 0 min   Stress: Stress Concern Present (7/9/2025)    [employer] - Occupational Stress Questionnaire     Feeling of Stress : Very much   Housing Stability: Low Risk  (7/9/2025)    Housing Stability Vital Sign     Unable to Pay for Housing in the Last Year: No     Number of Times Moved in the Last Year: 0     Homeless in the Last Year: No

## 2025-07-29 ENCOUNTER — TELEPHONE (OUTPATIENT)
Dept: FAMILY MEDICINE | Facility: CLINIC | Age: 55
End: 2025-07-29
Payer: MEDICAID

## 2025-07-29 ENCOUNTER — TELEPHONE (OUTPATIENT)
Dept: OBSTETRICS AND GYNECOLOGY | Facility: CLINIC | Age: 55
End: 2025-07-29
Payer: MEDICAID

## 2025-07-29 DIAGNOSIS — N76.0 BACTERIAL VAGINOSIS: Primary | ICD-10-CM

## 2025-07-29 DIAGNOSIS — B96.89 BACTERIAL VAGINOSIS: Primary | ICD-10-CM

## 2025-07-29 RX ORDER — METRONIDAZOLE 500 MG/1
500 TABLET ORAL 2 TIMES DAILY
Qty: 14 TABLET | Refills: 0 | Status: SHIPPED | OUTPATIENT
Start: 2025-07-29 | End: 2025-08-05

## 2025-07-29 NOTE — TELEPHONE ENCOUNTER
Copied from CRM #[id number]. Topic: General Inquiry - Patient Advice  >> Jul 29, 2025 11:41 AM Joan wrote:  .Who Called: Helena George    Patient is returning phone call    Who Left Message for Patient:  Does the patient know what this is regarding?:Pt calling in regards to results       Preferred Method of Contact: Phone Call  Patient's Preferred Phone Number on File: [phone]   Best Call Back Number, if different:  Additional Information:

## 2025-07-29 NOTE — TELEPHONE ENCOUNTER
Spoke with pharmacist. Pharmacist stated that patient asked them to call to see if  we had any changes for Helena. Informed them that she had an upcoming appointment on 8/8/25 and that her med list would be updated then and any changes noted.

## 2025-07-29 NOTE — TELEPHONE ENCOUNTER
Copied from CRM #[id number]. Topic: Medications - Pharmacy  >> Jul 28, 2025  2:46 PM Max wrote:  Who Called: [facility]         Caller is requesting assistance/information from provider's office.    Symptoms (please be specific): none   How long has patient had these symptoms:    List of preferred pharmacies on file (remove unneeded): [unfilled]  If different, enter pharmacy into here including location and phone number:       Preferred Method of Contact: Phone Call  Patient's Preferred Phone Number on File: [phone]   Best Call Back Number, if different:[phone]  Additional Information: Pharmacy is requesting a call back states they need clarification on medications, when asked which medication they stated her whole medication list.

## 2025-07-29 NOTE — TELEPHONE ENCOUNTER
Please notify pt of +BV on one swab. She will need to be treated with Flagyl 500mg BID x 7 days. Thanks!----- Message -----From: Shayy Perkinsent: 7/28/2025  11:42 AM CDTTo: Daria Caraballo WHNPSubject: Edit                                        The scan below was edited by Drea Perkins on 07/28/2025 at 11:42; it is attached to the following: the patient

## 2025-07-29 NOTE — TELEPHONE ENCOUNTER
Spoke with patient and answered questions, reminded her about upcoming appointment and advised her to bring meds to keep her list updated. Verbalized understanding

## 2025-07-30 LAB
HIGH RISK HPV: NEGATIVE
PSYCHE PATHOLOGY RESULT: NORMAL

## 2025-07-31 DIAGNOSIS — I10 PRIMARY HYPERTENSION: Chronic | ICD-10-CM

## 2025-07-31 RX ORDER — AMLODIPINE BESYLATE 10 MG/1
10 TABLET ORAL
Qty: 90 TABLET | Refills: 0 | Status: SHIPPED | OUTPATIENT
Start: 2025-07-31

## 2025-08-01 ENCOUNTER — HOSPITAL ENCOUNTER (OUTPATIENT)
Dept: RADIOLOGY | Facility: HOSPITAL | Age: 55
Discharge: HOME | End: 2025-08-01
Payer: MEDICAID

## 2025-08-01 DIAGNOSIS — N95.0 POSTMENOPAUSAL BLEEDING: ICD-10-CM

## 2025-08-01 PROCEDURE — 76830 TRANSVAGINAL US NON-OB: CPT | Mod: TC

## 2025-08-08 ENCOUNTER — OFFICE VISIT (OUTPATIENT)
Dept: FAMILY MEDICINE | Facility: CLINIC | Age: 55
End: 2025-08-08
Payer: MEDICAID

## 2025-08-08 VITALS
WEIGHT: 213 LBS | BODY MASS INDEX: 39.2 KG/M2 | OXYGEN SATURATION: 98 % | TEMPERATURE: 97 F | HEIGHT: 62 IN | HEART RATE: 77 BPM | DIASTOLIC BLOOD PRESSURE: 84 MMHG | SYSTOLIC BLOOD PRESSURE: 132 MMHG

## 2025-08-08 DIAGNOSIS — E11.65 TYPE 2 DIABETES MELLITUS WITH HYPERGLYCEMIA, WITH LONG-TERM CURRENT USE OF INSULIN: Primary | ICD-10-CM

## 2025-08-08 DIAGNOSIS — E11.42 DIABETIC POLYNEUROPATHY ASSOCIATED WITH TYPE 2 DIABETES MELLITUS: ICD-10-CM

## 2025-08-08 DIAGNOSIS — Z79.4 TYPE 2 DIABETES MELLITUS WITH HYPERGLYCEMIA, WITH LONG-TERM CURRENT USE OF INSULIN: Primary | ICD-10-CM

## 2025-08-08 PROCEDURE — 1160F RVW MEDS BY RX/DR IN RCRD: CPT | Mod: CPTII,,, | Performed by: FAMILY MEDICINE

## 2025-08-08 PROCEDURE — 1159F MED LIST DOCD IN RCRD: CPT | Mod: CPTII,,, | Performed by: FAMILY MEDICINE

## 2025-08-08 PROCEDURE — 99214 OFFICE O/P EST MOD 30 MIN: CPT | Mod: ,,, | Performed by: FAMILY MEDICINE

## 2025-08-08 PROCEDURE — 3079F DIAST BP 80-89 MM HG: CPT | Mod: CPTII,,, | Performed by: FAMILY MEDICINE

## 2025-08-08 PROCEDURE — 3044F HG A1C LEVEL LT 7.0%: CPT | Mod: CPTII,,, | Performed by: FAMILY MEDICINE

## 2025-08-08 PROCEDURE — 4010F ACE/ARB THERAPY RXD/TAKEN: CPT | Mod: CPTII,,, | Performed by: FAMILY MEDICINE

## 2025-08-08 PROCEDURE — 3008F BODY MASS INDEX DOCD: CPT | Mod: CPTII,,, | Performed by: FAMILY MEDICINE

## 2025-08-08 PROCEDURE — 3075F SYST BP GE 130 - 139MM HG: CPT | Mod: CPTII,,, | Performed by: FAMILY MEDICINE

## 2025-08-19 ENCOUNTER — TELEPHONE (OUTPATIENT)
Dept: FAMILY MEDICINE | Facility: CLINIC | Age: 55
End: 2025-08-19
Payer: MEDICAID

## 2025-08-19 ENCOUNTER — OFFICE VISIT (OUTPATIENT)
Dept: OBSTETRICS AND GYNECOLOGY | Facility: CLINIC | Age: 55
End: 2025-08-19
Payer: MEDICAID

## 2025-08-19 VITALS
WEIGHT: 212 LBS | SYSTOLIC BLOOD PRESSURE: 122 MMHG | DIASTOLIC BLOOD PRESSURE: 74 MMHG | BODY MASS INDEX: 39.01 KG/M2 | HEIGHT: 62 IN | TEMPERATURE: 98 F

## 2025-08-19 DIAGNOSIS — N95.0 PMB (POSTMENOPAUSAL BLEEDING): Primary | ICD-10-CM

## 2025-08-19 PROCEDURE — 4010F ACE/ARB THERAPY RXD/TAKEN: CPT | Mod: CPTII,,, | Performed by: OBSTETRICS & GYNECOLOGY

## 2025-08-19 PROCEDURE — 1159F MED LIST DOCD IN RCRD: CPT | Mod: CPTII,,, | Performed by: OBSTETRICS & GYNECOLOGY

## 2025-08-19 PROCEDURE — 3008F BODY MASS INDEX DOCD: CPT | Mod: CPTII,,, | Performed by: OBSTETRICS & GYNECOLOGY

## 2025-08-19 PROCEDURE — 99214 OFFICE O/P EST MOD 30 MIN: CPT | Mod: ,,, | Performed by: OBSTETRICS & GYNECOLOGY

## 2025-08-19 PROCEDURE — 3044F HG A1C LEVEL LT 7.0%: CPT | Mod: CPTII,,, | Performed by: OBSTETRICS & GYNECOLOGY

## 2025-08-19 PROCEDURE — 3074F SYST BP LT 130 MM HG: CPT | Mod: CPTII,,, | Performed by: OBSTETRICS & GYNECOLOGY

## 2025-08-19 PROCEDURE — 3078F DIAST BP <80 MM HG: CPT | Mod: CPTII,,, | Performed by: OBSTETRICS & GYNECOLOGY

## 2025-08-19 RX ORDER — CEFAZOLIN SODIUM 2 G/50ML
2 SOLUTION INTRAVENOUS
OUTPATIENT
Start: 2025-08-19

## 2025-08-19 RX ORDER — SODIUM CHLORIDE 9 MG/ML
INJECTION, SOLUTION INTRAVENOUS CONTINUOUS
OUTPATIENT
Start: 2025-08-19

## 2025-08-19 RX ORDER — FAMOTIDINE 20 MG/1
20 TABLET, FILM COATED ORAL
OUTPATIENT
Start: 2025-08-19

## 2025-08-19 RX ORDER — MUPIROCIN 20 MG/G
OINTMENT TOPICAL
OUTPATIENT
Start: 2025-08-19

## 2025-08-26 DIAGNOSIS — E11.65 TYPE 2 DIABETES MELLITUS WITH HYPERGLYCEMIA, WITHOUT LONG-TERM CURRENT USE OF INSULIN: ICD-10-CM

## 2025-08-26 RX ORDER — EMPAGLIFLOZIN 25 MG/1
25 TABLET, FILM COATED ORAL EVERY MORNING
Qty: 30 TABLET | Refills: 5 | Status: SHIPPED | OUTPATIENT
Start: 2025-08-26

## 2025-09-03 ENCOUNTER — TELEPHONE (OUTPATIENT)
Dept: FAMILY MEDICINE | Facility: CLINIC | Age: 55
End: 2025-09-03
Payer: MEDICAID

## 2025-09-03 ENCOUNTER — TELEPHONE (OUTPATIENT)
Dept: OBSTETRICS AND GYNECOLOGY | Facility: CLINIC | Age: 55
End: 2025-09-03
Payer: MEDICAID

## 2025-09-04 DIAGNOSIS — I10 PRIMARY HYPERTENSION: ICD-10-CM

## 2025-09-04 RX ORDER — HYDROCHLOROTHIAZIDE 25 MG/1
25 TABLET ORAL
Qty: 30 TABLET | Refills: 2 | Status: SHIPPED | OUTPATIENT
Start: 2025-09-04

## (undated) DEVICE — TUBE WATER AUXILIARY

## (undated) DEVICE — GOWN PATIENT DELUXE BLUE 30X42

## (undated) DEVICE — FORCEP ENDOJAW ALGTR W/NDL 2.8

## (undated) DEVICE — BITE BLOCK ADULT

## (undated) DEVICE — KIT SURGICAL COLON .25 1.1OZ